# Patient Record
Sex: FEMALE | Race: WHITE | NOT HISPANIC OR LATINO | Employment: FULL TIME | RURAL
[De-identification: names, ages, dates, MRNs, and addresses within clinical notes are randomized per-mention and may not be internally consistent; named-entity substitution may affect disease eponyms.]

---

## 2021-04-10 ENCOUNTER — HOSPITAL ENCOUNTER (EMERGENCY)
Facility: HOSPITAL | Age: 46
Discharge: HOME | End: 2021-04-10
Attending: STUDENT IN AN ORGANIZED HEALTH CARE EDUCATION/TRAINING PROGRAM
Payer: COMMERCIAL

## 2021-04-10 VITALS
BODY MASS INDEX: 45.91 KG/M2 | TEMPERATURE: 99 F | RESPIRATION RATE: 20 BRPM | HEIGHT: 66 IN | DIASTOLIC BLOOD PRESSURE: 89 MMHG | WEIGHT: 285.69 LBS | OXYGEN SATURATION: 98 % | SYSTOLIC BLOOD PRESSURE: 176 MMHG | HEART RATE: 51 BPM

## 2021-04-10 DIAGNOSIS — G43.009 MIGRAINE WITHOUT AURA AND WITHOUT STATUS MIGRAINOSUS, NOT INTRACTABLE: Primary | ICD-10-CM

## 2021-04-10 DIAGNOSIS — I16.0 HYPERTENSIVE URGENCY: ICD-10-CM

## 2021-04-10 PROCEDURE — 25000003 PHARM REV CODE 250: Performed by: STUDENT IN AN ORGANIZED HEALTH CARE EDUCATION/TRAINING PROGRAM

## 2021-04-10 PROCEDURE — 99283 PR EMERGENCY DEPT VISIT,LEVEL III: ICD-10-PCS | Mod: ,,, | Performed by: STUDENT IN AN ORGANIZED HEALTH CARE EDUCATION/TRAINING PROGRAM

## 2021-04-10 PROCEDURE — 99283 EMERGENCY DEPT VISIT LOW MDM: CPT | Mod: ,,, | Performed by: STUDENT IN AN ORGANIZED HEALTH CARE EDUCATION/TRAINING PROGRAM

## 2021-04-10 PROCEDURE — 63600175 PHARM REV CODE 636 W HCPCS: Performed by: STUDENT IN AN ORGANIZED HEALTH CARE EDUCATION/TRAINING PROGRAM

## 2021-04-10 PROCEDURE — 99283 EMERGENCY DEPT VISIT LOW MDM: CPT | Mod: 25

## 2021-04-10 PROCEDURE — 96372 THER/PROPH/DIAG INJ SC/IM: CPT

## 2021-04-10 RX ORDER — SERTRALINE HYDROCHLORIDE 25 MG/1
25 TABLET, FILM COATED ORAL DAILY
COMMUNITY
End: 2023-03-28

## 2021-04-10 RX ORDER — BUTALBITAL, ACETAMINOPHEN AND CAFFEINE 50; 325; 40 MG/1; MG/1; MG/1
1 TABLET ORAL EVERY 4 HOURS PRN
Qty: 20 TABLET | Refills: 0 | Status: SHIPPED | OUTPATIENT
Start: 2021-04-10 | End: 2021-05-03

## 2021-04-10 RX ORDER — ALPRAZOLAM 0.25 MG/1
0.25 TABLET ORAL DAILY PRN
COMMUNITY
End: 2021-05-03

## 2021-04-10 RX ORDER — NAPROXEN 250 MG/1
500 TABLET ORAL ONCE
Status: COMPLETED | OUTPATIENT
Start: 2021-04-10 | End: 2021-04-10

## 2021-04-10 RX ORDER — NAPROXEN 500 MG/1
500 TABLET ORAL 2 TIMES DAILY PRN
Qty: 14 TABLET | Refills: 0 | OUTPATIENT
Start: 2021-04-10 | End: 2021-08-11

## 2021-04-10 RX ORDER — PROCHLORPERAZINE EDISYLATE 5 MG/ML
10 INJECTION INTRAMUSCULAR; INTRAVENOUS
Status: COMPLETED | OUTPATIENT
Start: 2021-04-10 | End: 2021-04-10

## 2021-04-10 RX ORDER — HYDROCHLOROTHIAZIDE 25 MG/1
37.5 TABLET ORAL DAILY
COMMUNITY
End: 2021-05-03

## 2021-04-10 RX ORDER — METOPROLOL SUCCINATE 50 MG/1
50 TABLET, EXTENDED RELEASE ORAL 2 TIMES DAILY
COMMUNITY
End: 2021-05-03 | Stop reason: SDUPTHER

## 2021-04-10 RX ORDER — CLONIDINE HYDROCHLORIDE 0.1 MG/1
0.2 TABLET ORAL
Status: COMPLETED | OUTPATIENT
Start: 2021-04-10 | End: 2021-04-10

## 2021-04-10 RX ADMIN — CLONIDINE HYDROCHLORIDE 0.2 MG: 0.1 TABLET ORAL at 05:04

## 2021-04-10 RX ADMIN — PROCHLORPERAZINE EDISYLATE 10 MG: 5 INJECTION INTRAMUSCULAR; INTRAVENOUS at 05:04

## 2021-04-10 RX ADMIN — NAPROXEN 500 MG: 250 TABLET ORAL at 05:04

## 2021-05-03 ENCOUNTER — OFFICE VISIT (OUTPATIENT)
Dept: FAMILY MEDICINE | Facility: CLINIC | Age: 46
End: 2021-05-03
Payer: COMMERCIAL

## 2021-05-03 VITALS
DIASTOLIC BLOOD PRESSURE: 74 MMHG | WEIGHT: 280 LBS | HEIGHT: 66 IN | BODY MASS INDEX: 45 KG/M2 | TEMPERATURE: 98 F | HEART RATE: 68 BPM | OXYGEN SATURATION: 96 % | SYSTOLIC BLOOD PRESSURE: 138 MMHG | RESPIRATION RATE: 17 BRPM

## 2021-05-03 DIAGNOSIS — I10 HYPERTENSION, UNSPECIFIED TYPE: Primary | ICD-10-CM

## 2021-05-03 DIAGNOSIS — K21.9 GASTROESOPHAGEAL REFLUX DISEASE, UNSPECIFIED WHETHER ESOPHAGITIS PRESENT: ICD-10-CM

## 2021-05-03 DIAGNOSIS — G44.209 TENSION HEADACHE: ICD-10-CM

## 2021-05-03 LAB
25(OH)D3 SERPL-MCNC: 18.1 NG/ML
ALBUMIN SERPL BCP-MCNC: 3.6 G/DL (ref 3.5–5)
ALBUMIN/GLOB SERPL: 0.9 {RATIO}
ALP SERPL-CCNC: 115 U/L (ref 37–98)
ALT SERPL W P-5'-P-CCNC: 26 U/L (ref 13–56)
ANION GAP SERPL CALCULATED.3IONS-SCNC: 10 MMOL/L (ref 7–16)
AST SERPL W P-5'-P-CCNC: 13 U/L (ref 15–37)
BASOPHILS # BLD AUTO: 0.05 K/UL (ref 0–0.2)
BASOPHILS NFR BLD AUTO: 0.6 % (ref 0–1)
BILIRUB SERPL-MCNC: 0.3 MG/DL (ref 0–1.2)
BILIRUB UR QL STRIP: NEGATIVE
BUN SERPL-MCNC: 10 MG/DL (ref 7–18)
BUN/CREAT SERPL: 14 (ref 6–20)
CALCIUM SERPL-MCNC: 8.7 MG/DL (ref 8.5–10.1)
CHLORIDE SERPL-SCNC: 106 MMOL/L (ref 98–107)
CHOLEST SERPL-MCNC: 164 MG/DL (ref 0–200)
CHOLEST/HDLC SERPL: 3.5 {RATIO}
CLARITY UR: CLEAR
CO2 SERPL-SCNC: 30 MMOL/L (ref 21–32)
COLOR UR: YELLOW
CREAT SERPL-MCNC: 0.7 MG/DL (ref 0.55–1.02)
DIFFERENTIAL METHOD BLD: ABNORMAL
EOSINOPHIL # BLD AUTO: 0.18 K/UL (ref 0–0.5)
EOSINOPHIL NFR BLD AUTO: 2 % (ref 1–4)
ERYTHROCYTE [DISTWIDTH] IN BLOOD BY AUTOMATED COUNT: 13.4 % (ref 11.5–14.5)
GLOBULIN SER-MCNC: 3.8 G/DL (ref 2–4)
GLUCOSE SERPL-MCNC: 85 MG/DL (ref 74–106)
GLUCOSE UR STRIP-MCNC: NEGATIVE MG/DL
HCT VFR BLD AUTO: 44.8 % (ref 38–47)
HDLC SERPL-MCNC: 47 MG/DL (ref 40–60)
HGB BLD-MCNC: 14.6 G/DL (ref 12–16)
IMM GRANULOCYTES # BLD AUTO: 0.03 K/UL (ref 0–0.04)
IMM GRANULOCYTES NFR BLD: 0.3 % (ref 0–0.4)
KETONES UR STRIP-SCNC: NEGATIVE MG/DL
LDLC SERPL CALC-MCNC: 102 MG/DL
LDLC/HDLC SERPL: 2.2 {RATIO}
LEUKOCYTE ESTERASE UR QL STRIP: NEGATIVE
LYMPHOCYTES # BLD AUTO: 2.26 K/UL (ref 1–4.8)
LYMPHOCYTES NFR BLD AUTO: 25.5 % (ref 27–41)
MCH RBC QN AUTO: 28.6 PG (ref 27–31)
MCHC RBC AUTO-ENTMCNC: 32.6 G/DL (ref 32–36)
MCV RBC AUTO: 87.8 FL (ref 80–96)
MONOCYTES # BLD AUTO: 0.97 K/UL (ref 0–0.8)
MONOCYTES NFR BLD AUTO: 10.9 % (ref 2–6)
MPC BLD CALC-MCNC: 11.1 FL (ref 9.4–12.4)
NEUTROPHILS # BLD AUTO: 5.39 K/UL (ref 1.8–7.7)
NEUTROPHILS NFR BLD AUTO: 60.7 % (ref 53–65)
NITRITE UR QL STRIP: NEGATIVE
NONHDLC SERPL-MCNC: 117 MG/DL
NRBC # BLD AUTO: 0 X10E3/UL
NRBC, AUTO (.00): 0 %
PH UR STRIP: 6 PH UNITS
PLATELET # BLD AUTO: 320 K/UL (ref 150–400)
POTASSIUM SERPL-SCNC: 3.5 MMOL/L (ref 3.5–5.1)
PROT SERPL-MCNC: 7.4 G/DL (ref 6.4–8.2)
PROT UR QL STRIP: NEGATIVE
RBC # BLD AUTO: 5.1 M/UL (ref 4.2–5.4)
RBC # UR STRIP: ABNORMAL /UL
SODIUM SERPL-SCNC: 142 MMOL/L (ref 136–145)
SP GR UR STRIP: 1.02
T4 FREE SERPL-MCNC: 0.92 NG/DL (ref 0.76–1.46)
TRIGL SERPL-MCNC: 76 MG/DL (ref 35–150)
TSH SERPL DL<=0.005 MIU/L-ACNC: 3.36 UIU/ML (ref 0.36–3.74)
UROBILINOGEN UR STRIP-ACNC: 0.2 MG/DL
VLDLC SERPL-MCNC: 15 MG/DL
WBC # BLD AUTO: 8.88 K/UL (ref 4.5–11)

## 2021-05-03 PROCEDURE — 96372 PR INJECTION,THERAP/PROPH/DIAG2ST, IM OR SUBCUT: ICD-10-PCS | Mod: ,,, | Performed by: FAMILY MEDICINE

## 2021-05-03 PROCEDURE — 84439 T4, FREE: ICD-10-PCS | Mod: ,,, | Performed by: CLINICAL MEDICAL LABORATORY

## 2021-05-03 PROCEDURE — 85025 CBC WITH DIFFERENTIAL: ICD-10-PCS | Mod: ,,, | Performed by: CLINICAL MEDICAL LABORATORY

## 2021-05-03 PROCEDURE — 82306 VITAMIN D: ICD-10-PCS | Mod: ,,, | Performed by: CLINICAL MEDICAL LABORATORY

## 2021-05-03 PROCEDURE — 82306 VITAMIN D 25 HYDROXY: CPT | Mod: ,,, | Performed by: CLINICAL MEDICAL LABORATORY

## 2021-05-03 PROCEDURE — 80061 LIPID PANEL: CPT | Mod: ,,, | Performed by: CLINICAL MEDICAL LABORATORY

## 2021-05-03 PROCEDURE — 85025 COMPLETE CBC W/AUTO DIFF WBC: CPT | Mod: ,,, | Performed by: CLINICAL MEDICAL LABORATORY

## 2021-05-03 PROCEDURE — 96372 THER/PROPH/DIAG INJ SC/IM: CPT | Mod: ,,, | Performed by: FAMILY MEDICINE

## 2021-05-03 PROCEDURE — 81003 URINALYSIS AUTO W/O SCOPE: CPT | Mod: QW,,, | Performed by: CLINICAL MEDICAL LABORATORY

## 2021-05-03 PROCEDURE — 80061 LIPID PANEL: ICD-10-PCS | Mod: ,,, | Performed by: CLINICAL MEDICAL LABORATORY

## 2021-05-03 PROCEDURE — 81003 URINALYSIS: ICD-10-PCS | Mod: QW,,, | Performed by: CLINICAL MEDICAL LABORATORY

## 2021-05-03 PROCEDURE — 99214 PR OFFICE/OUTPT VISIT, EST, LEVL IV, 30-39 MIN: ICD-10-PCS | Mod: 25,,, | Performed by: FAMILY MEDICINE

## 2021-05-03 PROCEDURE — 84439 ASSAY OF FREE THYROXINE: CPT | Mod: ,,, | Performed by: CLINICAL MEDICAL LABORATORY

## 2021-05-03 PROCEDURE — 99214 OFFICE O/P EST MOD 30 MIN: CPT | Mod: 25,,, | Performed by: FAMILY MEDICINE

## 2021-05-03 RX ORDER — TRIAMTERENE AND HYDROCHLOROTHIAZIDE 37.5; 25 MG/1; MG/1
1 CAPSULE ORAL EVERY MORNING
COMMUNITY
End: 2021-05-03 | Stop reason: SDUPTHER

## 2021-05-03 RX ORDER — KETOROLAC TROMETHAMINE 30 MG/ML
60 INJECTION, SOLUTION INTRAMUSCULAR; INTRAVENOUS
Status: COMPLETED | OUTPATIENT
Start: 2021-05-03 | End: 2021-05-03

## 2021-05-03 RX ORDER — TRIAMTERENE AND HYDROCHLOROTHIAZIDE 37.5; 25 MG/1; MG/1
1 CAPSULE ORAL EVERY MORNING
Qty: 30 CAPSULE | Refills: 5 | Status: SHIPPED | OUTPATIENT
Start: 2021-05-03 | End: 2023-03-28

## 2021-05-03 RX ORDER — PANTOPRAZOLE SODIUM 40 MG/1
40 TABLET, DELAYED RELEASE ORAL DAILY
COMMUNITY
End: 2021-05-03 | Stop reason: SDUPTHER

## 2021-05-03 RX ORDER — TIZANIDINE 4 MG/1
4 TABLET ORAL EVERY 8 HOURS PRN
Qty: 35 TABLET | Refills: 0 | Status: SHIPPED | OUTPATIENT
Start: 2021-05-03 | End: 2023-03-28

## 2021-05-03 RX ORDER — ALPRAZOLAM 0.5 MG/1
0.5 TABLET ORAL DAILY PRN
COMMUNITY
Start: 2021-04-08 | End: 2023-12-06

## 2021-05-03 RX ORDER — PANTOPRAZOLE SODIUM 40 MG/1
40 TABLET, DELAYED RELEASE ORAL DAILY
Qty: 30 TABLET | Refills: 2 | Status: SHIPPED | OUTPATIENT
Start: 2021-05-03 | End: 2023-03-28 | Stop reason: SDUPTHER

## 2021-05-03 RX ORDER — METOPROLOL SUCCINATE 50 MG/1
50 TABLET, EXTENDED RELEASE ORAL 2 TIMES DAILY
Qty: 60 TABLET | Refills: 2 | Status: SHIPPED | OUTPATIENT
Start: 2021-05-03 | End: 2023-03-28

## 2021-05-03 RX ORDER — SERTRALINE HYDROCHLORIDE 50 MG/1
50 TABLET, FILM COATED ORAL EVERY MORNING
COMMUNITY
Start: 2021-04-08 | End: 2024-02-14

## 2021-05-03 RX ADMIN — KETOROLAC TROMETHAMINE 60 MG: 30 INJECTION, SOLUTION INTRAMUSCULAR; INTRAVENOUS at 09:05

## 2021-08-11 ENCOUNTER — HOSPITAL ENCOUNTER (EMERGENCY)
Facility: HOSPITAL | Age: 46
Discharge: HOME | End: 2021-08-11
Payer: COMMERCIAL

## 2021-08-11 VITALS
RESPIRATION RATE: 16 BRPM | SYSTOLIC BLOOD PRESSURE: 148 MMHG | BODY MASS INDEX: 45 KG/M2 | HEART RATE: 71 BPM | WEIGHT: 280 LBS | TEMPERATURE: 98 F | DIASTOLIC BLOOD PRESSURE: 105 MMHG | OXYGEN SATURATION: 98 % | HEIGHT: 66 IN

## 2021-08-11 DIAGNOSIS — R06.02 SHORTNESS OF BREATH: ICD-10-CM

## 2021-08-11 DIAGNOSIS — U07.1 CLINICAL DIAGNOSIS OF COVID-19: Primary | ICD-10-CM

## 2021-08-11 PROCEDURE — 99283 EMERGENCY DEPT VISIT LOW MDM: CPT | Mod: ,,, | Performed by: NURSE PRACTITIONER

## 2021-08-11 PROCEDURE — 99283 PR EMERGENCY DEPT VISIT,LEVEL III: ICD-10-PCS | Mod: ,,, | Performed by: NURSE PRACTITIONER

## 2021-08-11 PROCEDURE — 99283 EMERGENCY DEPT VISIT LOW MDM: CPT

## 2021-08-11 RX ORDER — GUAIFENESIN AND DEXTROMETHORPHAN HYDROBROMIDE 1200; 60 MG/1; MG/1
1 TABLET, EXTENDED RELEASE ORAL 2 TIMES DAILY
Qty: 20 TABLET | Refills: 0 | Status: SHIPPED | OUTPATIENT
Start: 2021-08-11 | End: 2021-08-21

## 2021-08-11 RX ORDER — ALBUTEROL SULFATE 90 UG/1
2 AEROSOL, METERED RESPIRATORY (INHALATION) EVERY 6 HOURS PRN
Qty: 20.1 G | Refills: 11 | Status: SHIPPED | OUTPATIENT
Start: 2021-08-11 | End: 2023-03-28

## 2021-11-22 ENCOUNTER — HOSPITAL ENCOUNTER (EMERGENCY)
Facility: HOSPITAL | Age: 46
Discharge: HOME | End: 2021-11-22
Payer: COMMERCIAL

## 2021-11-22 VITALS
DIASTOLIC BLOOD PRESSURE: 95 MMHG | RESPIRATION RATE: 18 BRPM | SYSTOLIC BLOOD PRESSURE: 165 MMHG | HEIGHT: 66 IN | TEMPERATURE: 98 F | HEART RATE: 70 BPM | OXYGEN SATURATION: 97 % | WEIGHT: 280 LBS | BODY MASS INDEX: 45 KG/M2

## 2021-11-22 DIAGNOSIS — R60.0 EDEMA OF RIGHT LOWER LEG: ICD-10-CM

## 2021-11-22 DIAGNOSIS — L03.115 CELLULITIS OF RIGHT LOWER EXTREMITY: Primary | ICD-10-CM

## 2021-11-22 LAB
ANION GAP SERPL CALCULATED.3IONS-SCNC: 8 MMOL/L (ref 7–16)
APTT PPP: 33.6 SECONDS (ref 25.2–37.3)
BASOPHILS # BLD AUTO: 0.07 K/UL (ref 0–0.2)
BASOPHILS NFR BLD AUTO: 0.6 % (ref 0–1)
BUN SERPL-MCNC: 12 MG/DL (ref 7–18)
BUN/CREAT SERPL: 13 (ref 6–20)
CALCIUM SERPL-MCNC: 8.2 MG/DL (ref 8.5–10.1)
CHLORIDE SERPL-SCNC: 102 MMOL/L (ref 98–107)
CO2 SERPL-SCNC: 31 MMOL/L (ref 21–32)
CREAT SERPL-MCNC: 0.89 MG/DL (ref 0.55–1.02)
DIFFERENTIAL METHOD BLD: ABNORMAL
EOSINOPHIL # BLD AUTO: 0.18 K/UL (ref 0–0.5)
EOSINOPHIL NFR BLD AUTO: 1.7 % (ref 1–4)
ERYTHROCYTE [DISTWIDTH] IN BLOOD BY AUTOMATED COUNT: 12.8 % (ref 11.5–14.5)
GLUCOSE SERPL-MCNC: 99 MG/DL (ref 74–106)
HCT VFR BLD AUTO: 40.5 % (ref 38–47)
HGB BLD-MCNC: 13.9 G/DL (ref 12–16)
IMM GRANULOCYTES # BLD AUTO: 0.03 K/UL (ref 0–0.04)
IMM GRANULOCYTES NFR BLD: 0.3 % (ref 0–0.4)
INR BLD: 0.87 (ref 0.9–1.1)
LYMPHOCYTES # BLD AUTO: 3.41 K/UL (ref 1–4.8)
LYMPHOCYTES NFR BLD AUTO: 31.3 % (ref 27–41)
MCH RBC QN AUTO: 29.2 PG (ref 27–31)
MCHC RBC AUTO-ENTMCNC: 34.3 G/DL (ref 32–36)
MCV RBC AUTO: 85.1 FL (ref 80–96)
MONOCYTES # BLD AUTO: 0.92 K/UL (ref 0–0.8)
MONOCYTES NFR BLD AUTO: 8.4 % (ref 2–6)
MPC BLD CALC-MCNC: 10.2 FL (ref 9.4–12.4)
NEUTROPHILS # BLD AUTO: 6.28 K/UL (ref 1.8–7.7)
NEUTROPHILS NFR BLD AUTO: 57.7 % (ref 53–65)
NRBC # BLD AUTO: 0 X10E3/UL
NRBC, AUTO (.00): 0 %
PLATELET # BLD AUTO: 380 K/UL (ref 150–400)
POTASSIUM SERPL-SCNC: 3.1 MMOL/L (ref 3.5–5.1)
PROTHROMBIN TIME: 11.9 SECONDS (ref 11.7–14.7)
RBC # BLD AUTO: 4.76 M/UL (ref 4.2–5.4)
SODIUM SERPL-SCNC: 138 MMOL/L (ref 136–145)
WBC # BLD AUTO: 10.89 K/UL (ref 4.5–11)

## 2021-11-22 PROCEDURE — 99284 EMERGENCY DEPT VISIT MOD MDM: CPT | Mod: 25

## 2021-11-22 PROCEDURE — 25000003 PHARM REV CODE 250: Performed by: NURSE PRACTITIONER

## 2021-11-22 PROCEDURE — 85025 COMPLETE CBC W/AUTO DIFF WBC: CPT | Performed by: NURSE PRACTITIONER

## 2021-11-22 PROCEDURE — 96372 THER/PROPH/DIAG INJ SC/IM: CPT

## 2021-11-22 PROCEDURE — 36415 COLL VENOUS BLD VENIPUNCTURE: CPT | Performed by: NURSE PRACTITIONER

## 2021-11-22 PROCEDURE — 85610 PROTHROMBIN TIME: CPT | Performed by: NURSE PRACTITIONER

## 2021-11-22 PROCEDURE — 99283 EMERGENCY DEPT VISIT LOW MDM: CPT | Mod: ,,, | Performed by: NURSE PRACTITIONER

## 2021-11-22 PROCEDURE — 80048 BASIC METABOLIC PNL TOTAL CA: CPT | Performed by: NURSE PRACTITIONER

## 2021-11-22 PROCEDURE — 99283 PR EMERGENCY DEPT VISIT,LEVEL III: ICD-10-PCS | Mod: ,,, | Performed by: NURSE PRACTITIONER

## 2021-11-22 PROCEDURE — 85730 THROMBOPLASTIN TIME PARTIAL: CPT | Performed by: NURSE PRACTITIONER

## 2021-11-22 RX ORDER — CLINDAMYCIN PHOSPHATE 150 MG/ML
600 INJECTION, SOLUTION INTRAVENOUS
Status: COMPLETED | OUTPATIENT
Start: 2021-11-22 | End: 2021-11-22

## 2021-11-22 RX ORDER — CLINDAMYCIN HYDROCHLORIDE 150 MG/1
300 CAPSULE ORAL EVERY 8 HOURS
Qty: 42 CAPSULE | Refills: 0 | Status: SHIPPED | OUTPATIENT
Start: 2021-11-22 | End: 2021-11-29

## 2021-11-22 RX ADMIN — CLINDAMYCIN PHOSPHATE 600 MG: 150 INJECTION, SOLUTION INTRAVENOUS at 09:11

## 2023-02-06 ENCOUNTER — HOSPITAL ENCOUNTER (EMERGENCY)
Facility: HOSPITAL | Age: 48
Discharge: HOME | End: 2023-02-06
Payer: COMMERCIAL

## 2023-02-06 VITALS
WEIGHT: 290 LBS | HEART RATE: 76 BPM | SYSTOLIC BLOOD PRESSURE: 144 MMHG | OXYGEN SATURATION: 95 % | DIASTOLIC BLOOD PRESSURE: 79 MMHG | HEIGHT: 66 IN | RESPIRATION RATE: 19 BRPM | BODY MASS INDEX: 46.61 KG/M2 | TEMPERATURE: 98 F

## 2023-02-06 DIAGNOSIS — K80.20 CALCULUS OF GALLBLADDER WITHOUT CHOLECYSTITIS WITHOUT OBSTRUCTION: ICD-10-CM

## 2023-02-06 DIAGNOSIS — K59.00 CONSTIPATION, UNSPECIFIED CONSTIPATION TYPE: ICD-10-CM

## 2023-02-06 DIAGNOSIS — M54.16 ACUTE LEFT LUMBAR RADICULOPATHY: Primary | ICD-10-CM

## 2023-02-06 LAB
ALBUMIN SERPL BCP-MCNC: 3.7 G/DL (ref 3.5–5)
ALBUMIN/GLOB SERPL: 1.1 {RATIO}
ALP SERPL-CCNC: 117 U/L (ref 39–100)
ALT SERPL W P-5'-P-CCNC: 32 U/L (ref 13–56)
ANION GAP SERPL CALCULATED.3IONS-SCNC: 11 MMOL/L (ref 7–16)
AST SERPL W P-5'-P-CCNC: 18 U/L (ref 15–37)
BACTERIA #/AREA URNS HPF: ABNORMAL /HPF
BASOPHILS # BLD AUTO: 0.09 K/UL (ref 0–0.2)
BASOPHILS NFR BLD AUTO: 0.8 % (ref 0–1)
BILIRUB SERPL-MCNC: 0.7 MG/DL (ref ?–1.2)
BILIRUB UR QL STRIP: NEGATIVE
BUN SERPL-MCNC: 11 MG/DL (ref 7–18)
BUN/CREAT SERPL: 12 (ref 6–20)
CALCIUM SERPL-MCNC: 8.9 MG/DL (ref 8.5–10.1)
CAOX CRY #/AREA URNS LPF: ABNORMAL /LPF
CHLORIDE SERPL-SCNC: 103 MMOL/L (ref 98–107)
CLARITY UR: ABNORMAL
CO2 SERPL-SCNC: 30 MMOL/L (ref 21–32)
COLOR UR: YELLOW
CREAT SERPL-MCNC: 0.94 MG/DL (ref 0.55–1.02)
DIFFERENTIAL METHOD BLD: ABNORMAL
EGFR (NO RACE VARIABLE) (RUSH/TITUS): 76 ML/MIN/1.73M²
EOSINOPHIL # BLD AUTO: 0.17 K/UL (ref 0–0.5)
EOSINOPHIL NFR BLD AUTO: 1.5 % (ref 1–4)
ERYTHROCYTE [DISTWIDTH] IN BLOOD BY AUTOMATED COUNT: 13.2 % (ref 11.5–14.5)
GLOBULIN SER-MCNC: 3.4 G/DL (ref 2–4)
GLUCOSE SERPL-MCNC: 90 MG/DL (ref 74–106)
GLUCOSE UR STRIP-MCNC: NORMAL MG/DL
HCT VFR BLD AUTO: 41.4 % (ref 38–47)
HGB BLD-MCNC: 14 G/DL (ref 12–16)
IMM GRANULOCYTES # BLD AUTO: 0.04 K/UL (ref 0–0.04)
IMM GRANULOCYTES NFR BLD: 0.3 % (ref 0–0.4)
KETONES UR STRIP-SCNC: ABNORMAL MG/DL
LEUKOCYTE ESTERASE UR QL STRIP: NEGATIVE
LYMPHOCYTES # BLD AUTO: 2.99 K/UL (ref 1–4.8)
LYMPHOCYTES NFR BLD AUTO: 25.8 % (ref 27–41)
MCH RBC QN AUTO: 29 PG (ref 27–31)
MCHC RBC AUTO-ENTMCNC: 33.8 G/DL (ref 32–36)
MCV RBC AUTO: 85.7 FL (ref 80–96)
MONOCYTES # BLD AUTO: 1.05 K/UL (ref 0–0.8)
MONOCYTES NFR BLD AUTO: 9.1 % (ref 2–6)
MPC BLD CALC-MCNC: 9.6 FL (ref 9.4–12.4)
MUCOUS THREADS #/AREA URNS HPF: ABNORMAL /HPF
NEUTROPHILS # BLD AUTO: 7.24 K/UL (ref 1.8–7.7)
NEUTROPHILS NFR BLD AUTO: 62.5 % (ref 53–65)
NITRITE UR QL STRIP: NEGATIVE
NRBC # BLD AUTO: 0 X10E3/UL
NRBC, AUTO (.00): 0 %
PH UR STRIP: 5.5 PH UNITS
PLATELET # BLD AUTO: 353 K/UL (ref 150–400)
POTASSIUM SERPL-SCNC: 3.6 MMOL/L (ref 3.5–5.1)
PROT SERPL-MCNC: 7.1 G/DL (ref 6.4–8.2)
PROT UR QL STRIP: 50
RBC # BLD AUTO: 4.83 M/UL (ref 4.2–5.4)
RBC # UR STRIP: ABNORMAL /UL
RBC #/AREA URNS HPF: ABNORMAL /HPF
SODIUM SERPL-SCNC: 140 MMOL/L (ref 136–145)
SP GR UR STRIP: 1.03
SQUAMOUS #/AREA URNS LPF: ABNORMAL /LPF
UROBILINOGEN UR STRIP-ACNC: 2 MG/DL
WBC # BLD AUTO: 11.58 K/UL (ref 4.5–11)
WBC #/AREA URNS HPF: ABNORMAL /HPF
YEAST #/AREA URNS HPF: ABNORMAL /HPF

## 2023-02-06 PROCEDURE — 25000003 PHARM REV CODE 250: Performed by: NURSE PRACTITIONER

## 2023-02-06 PROCEDURE — 99284 PR EMERGENCY DEPT VISIT,LEVEL IV: ICD-10-PCS | Mod: ,,, | Performed by: NURSE PRACTITIONER

## 2023-02-06 PROCEDURE — 96374 THER/PROPH/DIAG INJ IV PUSH: CPT

## 2023-02-06 PROCEDURE — 80053 COMPREHEN METABOLIC PANEL: CPT | Performed by: NURSE PRACTITIONER

## 2023-02-06 PROCEDURE — 99284 EMERGENCY DEPT VISIT MOD MDM: CPT | Mod: ,,, | Performed by: NURSE PRACTITIONER

## 2023-02-06 PROCEDURE — 85025 COMPLETE CBC W/AUTO DIFF WBC: CPT | Performed by: NURSE PRACTITIONER

## 2023-02-06 PROCEDURE — 63600175 PHARM REV CODE 636 W HCPCS: Performed by: NURSE PRACTITIONER

## 2023-02-06 PROCEDURE — 81003 URINALYSIS AUTO W/O SCOPE: CPT | Performed by: NURSE PRACTITIONER

## 2023-02-06 PROCEDURE — 99285 EMERGENCY DEPT VISIT HI MDM: CPT | Mod: 25

## 2023-02-06 PROCEDURE — 96372 THER/PROPH/DIAG INJ SC/IM: CPT | Performed by: NURSE PRACTITIONER

## 2023-02-06 PROCEDURE — 96361 HYDRATE IV INFUSION ADD-ON: CPT

## 2023-02-06 RX ORDER — IBUPROFEN 800 MG/1
800 TABLET ORAL 3 TIMES DAILY
Qty: 20 TABLET | Refills: 0 | Status: SHIPPED | OUTPATIENT
Start: 2023-02-06 | End: 2023-03-28

## 2023-02-06 RX ORDER — KETOROLAC TROMETHAMINE 30 MG/ML
60 INJECTION, SOLUTION INTRAMUSCULAR; INTRAVENOUS
Status: COMPLETED | OUTPATIENT
Start: 2023-02-06 | End: 2023-02-06

## 2023-02-06 RX ORDER — TIZANIDINE 4 MG/1
4 TABLET ORAL EVERY 6 HOURS PRN
Qty: 20 TABLET | Refills: 0 | Status: SHIPPED | OUTPATIENT
Start: 2023-02-06 | End: 2023-02-16

## 2023-02-06 RX ORDER — ORPHENADRINE CITRATE 30 MG/ML
60 INJECTION INTRAMUSCULAR; INTRAVENOUS
Status: COMPLETED | OUTPATIENT
Start: 2023-02-06 | End: 2023-02-06

## 2023-02-06 RX ORDER — ONDANSETRON 2 MG/ML
4 INJECTION INTRAMUSCULAR; INTRAVENOUS
Status: COMPLETED | OUTPATIENT
Start: 2023-02-06 | End: 2023-02-06

## 2023-02-06 RX ADMIN — SODIUM CHLORIDE 1000 ML: 9 INJECTION, SOLUTION INTRAVENOUS at 07:02

## 2023-02-06 RX ADMIN — ONDANSETRON HYDROCHLORIDE 4 MG: 2 SOLUTION INTRAMUSCULAR; INTRAVENOUS at 07:02

## 2023-02-06 RX ADMIN — ORPHENADRINE CITRATE 60 MG: 30 INJECTION INTRAMUSCULAR; INTRAVENOUS at 10:02

## 2023-02-06 RX ADMIN — KETOROLAC TROMETHAMINE 60 MG: 60 INJECTION, SOLUTION INTRAMUSCULAR at 07:02

## 2023-02-06 NOTE — Clinical Note
"Jonathan "Jonathan" Need was seen and treated in our emergency department on 2/6/2023.  She may return to work on 02/09/2023.       If you have any questions or concerns, please don't hesitate to call.      ANA LAURA Ballard"

## 2023-02-07 NOTE — ED PROVIDER NOTES
Encounter Date: 2/6/2023       History     Chief Complaint   Patient presents with    Abdominal Pain     Patient presents to the ER with complaint of left lower quadrant abdominal pain that radiates from left abdomen into the groin into the left leg down to the knee.  Patient states the symptoms started today acutely.  She denies injury or accident.  She denies fever.  No dysuria.  She does report frequency of urination.  She reports normal bowel movements with occasional loose stools.  No nausea vomiting or diarrhea.     The history is provided by the spouse and the patient. No  was used.   Review of patient's allergies indicates:   Allergen Reactions    Lisinopril Other (See Comments)     cough    Aspirin Nausea And Vomiting     Past Medical History:   Diagnosis Date    Hypertension     Pseudoseizures      Past Surgical History:   Procedure Laterality Date    bladder tack      HYSTERECTOMY       No family history on file.  Social History     Tobacco Use    Smoking status: Never    Smokeless tobacco: Never   Substance Use Topics    Alcohol use: Never    Drug use: Never     Review of Systems   Constitutional:  Positive for activity change, appetite change and fatigue.   Gastrointestinal:  Positive for abdominal distention and abdominal pain.   Musculoskeletal:  Positive for back pain and myalgias.   All other systems reviewed and are negative.    Physical Exam     Initial Vitals [02/06/23 1834]   BP Pulse Resp Temp SpO2   (!) 163/118 104 19 97.7 °F (36.5 °C) 97 %      MAP       --         Physical Exam    Nursing note and vitals reviewed.  Constitutional: She appears well-developed and well-nourished.   HENT:   Head: Normocephalic.   Right Ear: External ear normal.   Left Ear: External ear normal.   Nose: Nose normal.   Mouth/Throat: Oropharynx is clear and moist.   Eyes: Conjunctivae and EOM are normal. Pupils are equal, round, and reactive to light.   Neck: Neck supple.   Normal range of  motion.  Cardiovascular:  Normal rate, regular rhythm, normal heart sounds and intact distal pulses.           Pulmonary/Chest: Breath sounds normal.   Abdominal: Abdomen is soft and protuberant. Bowel sounds are normal. She exhibits distension. There is abdominal tenderness (Left lower quadrant) in the suprapubic area and left lower quadrant.     There is no rebound and negative Portillo's sign.   Musculoskeletal:         General: Normal range of motion.      Cervical back: Normal range of motion and neck supple.     Neurological: She is alert and oriented to person, place, and time. She has normal strength. GCS score is 15. GCS eye subscore is 4. GCS verbal subscore is 5. GCS motor subscore is 6.   Skin: Skin is warm and dry. Capillary refill takes less than 2 seconds.   Psychiatric: She has a normal mood and affect. Her behavior is normal. Judgment and thought content normal.       Medical Screening Exam   See Full Note    ED Course   Procedures  Labs Reviewed   COMPREHENSIVE METABOLIC PANEL - Abnormal; Notable for the following components:       Result Value    Alk Phos 117 (*)     All other components within normal limits   URINALYSIS, REFLEX TO URINE CULTURE - Abnormal; Notable for the following components:    Protein, UA 50 (*)     Urobilinogen, UA 2 (*)     Blood, UA Small (*)     All other components within normal limits   CBC WITH DIFFERENTIAL - Abnormal; Notable for the following components:    WBC 11.58 (*)     Lymphocytes % 25.8 (*)     Monocytes % 9.1 (*)     Monocytes, Absolute 1.05 (*)     All other components within normal limits   URINALYSIS, MICROSCOPIC - Abnormal; Notable for the following components:    Calcium Oxalate Crystals, UA Many (*)     All other components within normal limits   CBC W/ AUTO DIFFERENTIAL    Narrative:     The following orders were created for panel order CBC auto differential.  Procedure                               Abnormality         Status                     ---------                                -----------         ------                     CBC with Differential[[id number]]        Abnormal            Final result                 Please view results for these tests on the individual orders.   EXTRA TUBES    Narrative:     The following orders were created for panel order EXTRA TUBES.  Procedure                               Abnormality         Status                     ---------                               -----------         ------                     Light Blue Top Hold[[id number]]                              In process                 Light Green Top Hold[[id number]]                             In process                   Please view results for these tests on the individual orders.   LIGHT BLUE TOP HOLD   LIGHT GREEN TOP HOLD          Imaging Results              CT Renal Stone Study ABD Pelvis WO (In process)                      X-Ray Lumbar Spine Ap And Lateral (Final result)  Result time 02/06/23 19:55:52      Final result by Juan Holly DO (02/06/23 19:55:52)                   Impression:      No acute fracture.    No malalignment.    Mild multi-level degenerative change.    Multiple large stones located within the gallbladder with the largest measuring up to 3 cm.      Electronically signed by: Juan Holly  Date:    02/06/2023  Time:    19:55               Narrative:    EXAMINATION:  XR LUMBAR SPINE AP AND LATERAL    CLINICAL HISTORY:  left flank and lower abdominal pain that radiates to left leg;.    TECHNIQUE:  XR LUMBAR SPINE AP AND LATERAL    COMPARISON:  2014    FINDINGS:  No acute fracture.    No malalignment.    Mild multi-level degenerative change.    Multiple large stones located within the gallbladder with the largest measuring up to 3 cm.                                       Medications   sodium chloride 0.9% bolus 1,000 mL 1,000 mL (0 mLs Intravenous Stopped 2/6/23 2045)   ondansetron injection 4 mg (4 mg Intravenous Given 2/6/23 1945)    ketorolac injection 60 mg (60 mg Intramuscular Given 2/6/23 1944)   orphenadrine injection 60 mg (60 mg Intramuscular Given 2/6/23 2236)     Medical Decision Making:   Initial Assessment:   Patient presents to the ER with complaint of left lower quadrant abdominal pain that radiates from left abdomen into the groin into the left leg down to the knee.  Patient states the symptoms started today acutely.  She denies injury or accident.  She denies fever.  No dysuria.  She does report frequency of urination.  She reports normal bowel movements with occasional loose stools.  No nausea vomiting or diarrhea.    Differential Diagnosis:   Colitis  Diverticulitis  Renal calculi  Ureteral calculi    ED Management:  CBC white blood H/H 14/41, platelets 353  CMP sodium 140, potassium 3.6, creatinine 0.9, BUN 11, alk-phos 117  Urinalysis positive for protein and small amount of occult blood, no leukocytes or nitrites noted many calcium oxalate crystals noted.    Lumbar x-ray:No acute fracture.No malalignment.Mild multi-level degenerative change.Multiple large stones located within the gallbladder with the largest measuring up to 3 cm.  Moderate colonic stool.  Initiate IV  1 L normal saline bolus  Toradol 30 mg IV-patient voiced improvement of pain after dose of Toradol.  CT scan of the abdomen and pelvis without contrast cholelithiasis no renal or ureteral calculi no suspicious GI abnormalities are seen     Norflex 60 mg IM to be given for muscle spasms.  Patient will be discharged home with diagnosis of constipation, and incidental finding of cholelithiasis.  She will be given referral to General surgery for follow up the cholelithiasis.  Shoes instructed to add MiraLax 1 capful to daily medications for the next 10-12 days.  She was also instructed to increase her water and fiber intake in her diet.  She will be given prescription for tizanidine and ibuprofen to treat muscle spasms.  She was instructed to follow up with  primary care provider in 2 days if symptoms do not improve.  She was instructed to return to the ER with new or worsening symptoms.  Patient verbalizes understanding and agrees with plan of care.                 Clinical Impression:   Final diagnoses:  [K59.00] Constipation, unspecified constipation type  [K80.20] Calculus of gallbladder without cholecystitis without obstruction  [M54.16] Acute left lumbar radiculopathy (Primary)        ED Disposition Condition    Discharge Stable          ED Prescriptions       Medication Sig Dispense Start Date End Date Auth. Provider    tiZANidine (ZANAFLEX) 4 MG tablet Take 1 tablet (4 mg total) by mouth every 6 (six) hours as needed (Muscle spasms). 20 tablet 2/6/2023 2/16/2023 ANA LAURA Ballard    ibuprofen (ADVIL,MOTRIN) 800 MG tablet Take 1 tablet (800 mg total) by mouth 3 (three) times daily. 20 tablet 2/6/2023 -- ANA LAURA Ballard          Follow-up Information       Follow up With Specialties Details Why Contact Info    Primary care provider  Schedule an appointment as soon as possible for a visit in 2 days If symptoms worsen     Mikael Cunha MD General Surgery, Surgery Schedule an appointment as soon as possible for a visit   [address] [place] [id number]  [phone]               ANA LAURA Ballard  02/06/23 9480

## 2023-02-07 NOTE — DISCHARGE INSTRUCTIONS
Take medications as prescribed-For muscle spasms.  Alternate heat and ice compresses for comfort.  Add MiraLax 1 capful to daily medications times 10-12 days.  Increase water and fiber intake in diet.  Call Dr. Cunha's office to schedule follow up appointment cholelithiasis.  Follow up primary care provider in 2 days if symptoms do not improve.  Return to the ER with new or worsening symptoms.

## 2023-02-20 ENCOUNTER — OFFICE VISIT (OUTPATIENT)
Dept: SURGERY | Facility: CLINIC | Age: 48
End: 2023-02-20
Attending: SURGERY
Payer: COMMERCIAL

## 2023-02-20 VITALS
HEIGHT: 66 IN | TEMPERATURE: 98 F | RESPIRATION RATE: 22 BRPM | BODY MASS INDEX: 46.28 KG/M2 | OXYGEN SATURATION: 97 % | WEIGHT: 288 LBS | SYSTOLIC BLOOD PRESSURE: 98 MMHG | HEART RATE: 83 BPM | DIASTOLIC BLOOD PRESSURE: 60 MMHG

## 2023-02-20 DIAGNOSIS — M54.10 RADICULAR LOW BACK PAIN: ICD-10-CM

## 2023-02-20 DIAGNOSIS — K59.09 CHRONIC CONSTIPATION: ICD-10-CM

## 2023-02-20 DIAGNOSIS — K59.00 CONSTIPATION, UNSPECIFIED CONSTIPATION TYPE: Primary | ICD-10-CM

## 2023-02-20 DIAGNOSIS — K80.10 CALCULUS OF GALLBLADDER WITH CHRONIC CHOLECYSTITIS WITHOUT OBSTRUCTION: ICD-10-CM

## 2023-02-20 DIAGNOSIS — M54.16 ACUTE LEFT LUMBAR RADICULOPATHY: ICD-10-CM

## 2023-02-20 PROCEDURE — 1159F PR MEDICATION LIST DOCUMENTED IN MEDICAL RECORD: ICD-10-PCS | Mod: CPTII,,, | Performed by: SURGERY

## 2023-02-20 PROCEDURE — 99205 PR OFFICE/OUTPT VISIT, NEW, LEVL V, 60-74 MIN: ICD-10-PCS | Mod: S$PBB,,, | Performed by: SURGERY

## 2023-02-20 PROCEDURE — 3078F DIAST BP <80 MM HG: CPT | Mod: CPTII,,, | Performed by: SURGERY

## 2023-02-20 PROCEDURE — 3078F PR MOST RECENT DIASTOLIC BLOOD PRESSURE < 80 MM HG: ICD-10-PCS | Mod: CPTII,,, | Performed by: SURGERY

## 2023-02-20 PROCEDURE — 99215 OFFICE O/P EST HI 40 MIN: CPT | Mod: PBBFAC | Performed by: SURGERY

## 2023-02-20 PROCEDURE — 99205 OFFICE O/P NEW HI 60 MIN: CPT | Mod: S$PBB,,, | Performed by: SURGERY

## 2023-02-20 PROCEDURE — 3008F PR BODY MASS INDEX (BMI) DOCUMENTED: ICD-10-PCS | Mod: CPTII,,, | Performed by: SURGERY

## 2023-02-20 PROCEDURE — 1159F MED LIST DOCD IN RCRD: CPT | Mod: CPTII,,, | Performed by: SURGERY

## 2023-02-20 PROCEDURE — 3008F BODY MASS INDEX DOCD: CPT | Mod: CPTII,,, | Performed by: SURGERY

## 2023-02-20 PROCEDURE — 3074F PR MOST RECENT SYSTOLIC BLOOD PRESSURE < 130 MM HG: ICD-10-PCS | Mod: CPTII,,, | Performed by: SURGERY

## 2023-02-20 PROCEDURE — 3074F SYST BP LT 130 MM HG: CPT | Mod: CPTII,,, | Performed by: SURGERY

## 2023-02-20 NOTE — H&P (VIEW-ONLY)
"Subjective:       Patient ID: Jonathan Rodriguez is a 47 y.o. female.    Chief Complaint: Other (Pt here because she is having pain she thinks from Gallbladder issues--went to ER on 2-6-23 with gall bladder issues and constipation--taking Miralax and gummies for fiber--Has left hip pain that radiates down left leg--pain 8/10 on pain scale--took muscle relaxant and  Ibuprofen about 3am today--)    This 47-year-old female patient presents to the clinic with complaint of abdominal pain, constipation, and low back pain with radiation to the left hip and leg.  During a recent workup in the emergency department, she underwent a CT scan which revealed a gallbladder full of gallstones.  She presents to me for further recommendations regarding management.    family history is not on file.  Past Medical History:   Diagnosis Date    Hypertension     Pseudoseizures       Past Surgical History:   Procedure Laterality Date    bladder tack      HYSTERECTOMY         reports that she has never smoked. She has never used smokeless tobacco. She reports that she does not drink alcohol and does not use drugs.     Review of Systems   All other systems reviewed and are negative.       Objective:      BP 98/60 (BP Location: Other (Comment), Patient Position: Sitting) Comment (BP Location): right wrist  Pulse 83   Temp 98.3 °F (36.8 °C) (Oral)   Resp (!) 22   Ht 5' 6" (1.676 m)   Wt 130.6 kg (288 lb)   SpO2 97%   BMI 46.48 kg/m²    Physical Exam  Constitutional:       General: She is awake.      Appearance: Normal appearance.   HENT:      Head: Atraumatic.   Cardiovascular:      Rate and Rhythm: Normal rate and regular rhythm.   Pulmonary:      Effort: Pulmonary effort is normal.   Chest:      Chest wall: No deformity.   Abdominal:      General: There is no distension.      Palpations: Abdomen is soft. There is no mass.      Tenderness: There is no abdominal tenderness.      Hernia: No hernia is present.   Musculoskeletal:         " General: No swelling.      Right lower leg: No edema.      Left lower leg: No edema.   Skin:     General: Skin is warm and dry.      Capillary Refill: Capillary refill takes less than 2 seconds.   Neurological:      General: No focal deficit present.      Mental Status: She is alert.   Psychiatric:         Mood and Affect: Mood normal.       Assessment/Plan:         Constipation, unspecified constipation type  -     Ambulatory referral/consult to Gastroenterology; Future; Expected date: 02/27/2023    Acute left lumbar radiculopathy  -     Ambulatory referral/consult to General Surgery  -     Ambulatory referral/consult to Spine Surgery; Future; Expected date: 02/27/2023    Calculus of gallbladder with chronic cholecystitis without obstruction    Chronic constipation    Radicular low back pain         Problem List Items Addressed This Visit          GI    Cholelithiasis with chronic cholecystitis without biliary obstruction    Current Assessment & Plan     Risks and benefits of surgery discussed to include infection, bleeding, hernia, possible drain, duct injury, retained stones, open conversion, possible need for postop ERCP or further surgery, and unforeseen.  Patient expresses understanding and wishes to proceed.         Chronic constipation    Current Assessment & Plan     Referral to GI for possible colonoscopy and further workup of constipation            Orthopedic    Radicular low back pain    Current Assessment & Plan     Refer to ortho spine at some point in the near future.          Other Visit Diagnoses       Constipation, unspecified constipation type    -  Primary    Relevant Orders    Ambulatory referral/consult to Gastroenterology    Acute left lumbar radiculopathy        Relevant Orders    Ambulatory referral/consult to Spine Surgery

## 2023-02-20 NOTE — PATIENT INSTRUCTIONS
[facility]                                                                                                                                                                                                                           Day of Surgery      Your surgery is scheduled for 03/10/2023 at [facility] on the ground floor of the Ambulatory building.     Your arrival time is 0600am.              DO NOT EAT OR DRINK ANYTHING AFTER MIDNIGHT.          You may take blood pressure medication with a small drink of water the morning of surgery.                                 DO NOT TAKE INSULIN OR ANY OTHER BLOOD SUGAR MEDICATIONS.           The following blood sugar medications have to be stopped 48 hours prior to surgery:                    Metformin        Glucovance          Metaglip             Fortamet           Glucophage                   Riomet             Avandamet          Glimepiride              IF YOU ARE ON ANY OF THESE BLOOD THINNERS, MAKE SURE YOUR PHYSICIAN IS AWARE.                Eliquis/Apixaban             Xarelto/Rivaroxaban             Plavix/Clopidogrel                  Wafarin/Coumadin,Jantoven           Pletal/Cilostazol              Pradaxa/Dibigatran                           PLEASE USE CHLORHEXIDINE WASH THE NIGHT BEFORE SURGERY AND THE MORNING OF SURGERY.             YOU CANNOT DRIVE YOURSELF HOME FROM THE HOSPITAL THE DAY OF SURGERY.        Please have a  with you.           Bring all medications, that you are currently taking, with you to the hospital the morning of your procedure.           Please leave all valuables at home.          Children under the age of 18 must be accompanied by an adult.          PLEASE UNDERSTAND THAT OUR OFFICE DOES NOT GIVE PATHOLOGY RESULTS OR TEST  RESULTS OVER THE PHONE.         THIS WILL BE DISCUSSED WITH YOU ON YOUR FOLLOW UP APPOINTMENT TO DISCUSS IN PERSON.

## 2023-02-21 RX ORDER — SODIUM CHLORIDE 9 MG/ML
INJECTION, SOLUTION INTRAVENOUS CONTINUOUS
Status: CANCELLED | OUTPATIENT
Start: 2023-02-21

## 2023-03-08 DIAGNOSIS — M54.16 LUMBAR RADICULOPATHY: Primary | ICD-10-CM

## 2023-03-10 ENCOUNTER — ANESTHESIA (OUTPATIENT)
Dept: SURGERY | Facility: HOSPITAL | Age: 48
End: 2023-03-10
Payer: COMMERCIAL

## 2023-03-10 ENCOUNTER — HOSPITAL ENCOUNTER (OUTPATIENT)
Facility: HOSPITAL | Age: 48
Discharge: HOME | End: 2023-03-10
Attending: SURGERY | Admitting: SURGERY
Payer: COMMERCIAL

## 2023-03-10 ENCOUNTER — ANESTHESIA EVENT (OUTPATIENT)
Dept: SURGERY | Facility: HOSPITAL | Age: 48
End: 2023-03-10
Payer: COMMERCIAL

## 2023-03-10 VITALS
TEMPERATURE: 98 F | RESPIRATION RATE: 18 BRPM | OXYGEN SATURATION: 90 % | DIASTOLIC BLOOD PRESSURE: 93 MMHG | HEART RATE: 78 BPM | SYSTOLIC BLOOD PRESSURE: 158 MMHG

## 2023-03-10 DIAGNOSIS — K59.00 CONSTIPATION, UNSPECIFIED CONSTIPATION TYPE: ICD-10-CM

## 2023-03-10 DIAGNOSIS — K80.10 CALCULUS OF GALLBLADDER WITH CHRONIC CHOLECYSTITIS WITHOUT OBSTRUCTION: Primary | ICD-10-CM

## 2023-03-10 PROCEDURE — 36000708 HC OR TIME LEV III 1ST 15 MIN: Performed by: SURGERY

## 2023-03-10 PROCEDURE — 27000716 HC OXISENSOR PROBE, ANY SIZE: Performed by: ANESTHESIOLOGY

## 2023-03-10 PROCEDURE — 37000009 HC ANESTHESIA EA ADD 15 MINS: Performed by: SURGERY

## 2023-03-10 PROCEDURE — 88304 TISSUE EXAM BY PATHOLOGIST: CPT | Mod: TC,SUR | Performed by: SURGERY

## 2023-03-10 PROCEDURE — 36000709 HC OR TIME LEV III EA ADD 15 MIN: Performed by: SURGERY

## 2023-03-10 PROCEDURE — D9220A PRA ANESTHESIA: Mod: CRNA,,, | Performed by: NURSE ANESTHETIST, CERTIFIED REGISTERED

## 2023-03-10 PROCEDURE — 25000003 PHARM REV CODE 250: Performed by: NURSE ANESTHETIST, CERTIFIED REGISTERED

## 2023-03-10 PROCEDURE — 37000008 HC ANESTHESIA 1ST 15 MINUTES: Performed by: SURGERY

## 2023-03-10 PROCEDURE — 25000003 PHARM REV CODE 250: Performed by: ANESTHESIOLOGY

## 2023-03-10 PROCEDURE — D9220A PRA ANESTHESIA: Mod: ANES,,, | Performed by: ANESTHESIOLOGY

## 2023-03-10 PROCEDURE — 47562 LAPAROSCOPIC CHOLECYSTECTOMY: CPT | Mod: ,,, | Performed by: SURGERY

## 2023-03-10 PROCEDURE — D9220A PRA ANESTHESIA: ICD-10-PCS | Mod: CRNA,,, | Performed by: NURSE ANESTHETIST, CERTIFIED REGISTERED

## 2023-03-10 PROCEDURE — 25000003 PHARM REV CODE 250: Performed by: SURGERY

## 2023-03-10 PROCEDURE — 27000510 HC BLANKET BAIR HUGGER ANY SIZE: Performed by: ANESTHESIOLOGY

## 2023-03-10 PROCEDURE — 27000165 HC TUBE, ETT CUFFED: Performed by: ANESTHESIOLOGY

## 2023-03-10 PROCEDURE — 63600175 PHARM REV CODE 636 W HCPCS: Performed by: ANESTHESIOLOGY

## 2023-03-10 PROCEDURE — 88304 SURGICAL PATHOLOGY: ICD-10-PCS | Mod: 26,,, | Performed by: PATHOLOGY

## 2023-03-10 PROCEDURE — 88304 TISSUE EXAM BY PATHOLOGIST: CPT | Mod: 26,,, | Performed by: PATHOLOGY

## 2023-03-10 PROCEDURE — 71000033 HC RECOVERY, INTIAL HOUR: Performed by: SURGERY

## 2023-03-10 PROCEDURE — D9220A PRA ANESTHESIA: ICD-10-PCS | Mod: ANES,,, | Performed by: ANESTHESIOLOGY

## 2023-03-10 PROCEDURE — 27201423 OPTIME MED/SURG SUP & DEVICES STERILE SUPPLY: Performed by: SURGERY

## 2023-03-10 PROCEDURE — 27000689 HC BLADE LARYNGOSCOPE ANY SIZE: Performed by: ANESTHESIOLOGY

## 2023-03-10 PROCEDURE — 27000655: Performed by: ANESTHESIOLOGY

## 2023-03-10 PROCEDURE — 71000016 HC POSTOP RECOV ADDL HR: Performed by: SURGERY

## 2023-03-10 PROCEDURE — 47562 PR LAP,CHOLECYSTECTOMY: ICD-10-PCS | Mod: ,,, | Performed by: SURGERY

## 2023-03-10 PROCEDURE — 71000015 HC POSTOP RECOV 1ST HR: Performed by: SURGERY

## 2023-03-10 PROCEDURE — 63600175 PHARM REV CODE 636 W HCPCS: Performed by: NURSE ANESTHETIST, CERTIFIED REGISTERED

## 2023-03-10 RX ORDER — FENTANYL CITRATE 50 UG/ML
INJECTION, SOLUTION INTRAMUSCULAR; INTRAVENOUS
Status: DISCONTINUED | OUTPATIENT
Start: 2023-03-10 | End: 2023-03-10

## 2023-03-10 RX ORDER — LIDOCAINE HYDROCHLORIDE 20 MG/ML
INJECTION, SOLUTION EPIDURAL; INFILTRATION; INTRACAUDAL; PERINEURAL
Status: DISCONTINUED | OUTPATIENT
Start: 2023-03-10 | End: 2023-03-10

## 2023-03-10 RX ORDER — ROCURONIUM BROMIDE 50 MG/5 ML
SYRINGE (ML) INTRAVENOUS
Status: DISCONTINUED | OUTPATIENT
Start: 2023-03-10 | End: 2023-03-10

## 2023-03-10 RX ORDER — CEFAZOLIN SODIUM 1 G/3ML
INJECTION, POWDER, FOR SOLUTION INTRAMUSCULAR; INTRAVENOUS
Status: DISCONTINUED | OUTPATIENT
Start: 2023-03-10 | End: 2023-03-10

## 2023-03-10 RX ORDER — DIPHENHYDRAMINE HYDROCHLORIDE 50 MG/ML
25 INJECTION INTRAMUSCULAR; INTRAVENOUS EVERY 6 HOURS PRN
Status: DISCONTINUED | OUTPATIENT
Start: 2023-03-10 | End: 2023-03-10 | Stop reason: HOSPADM

## 2023-03-10 RX ORDER — MIDAZOLAM HYDROCHLORIDE 1 MG/ML
INJECTION INTRAMUSCULAR; INTRAVENOUS
Status: DISCONTINUED | OUTPATIENT
Start: 2023-03-10 | End: 2023-03-10

## 2023-03-10 RX ORDER — DEXAMETHASONE SODIUM PHOSPHATE 4 MG/ML
INJECTION, SOLUTION INTRA-ARTICULAR; INTRALESIONAL; INTRAMUSCULAR; INTRAVENOUS; SOFT TISSUE
Status: DISCONTINUED | OUTPATIENT
Start: 2023-03-10 | End: 2023-03-10

## 2023-03-10 RX ORDER — ONDANSETRON 4 MG/1
8 TABLET, ORALLY DISINTEGRATING ORAL EVERY 8 HOURS PRN
Status: DISCONTINUED | OUTPATIENT
Start: 2023-03-10 | End: 2023-03-10 | Stop reason: HOSPADM

## 2023-03-10 RX ORDER — MORPHINE SULFATE 10 MG/ML
4 INJECTION INTRAMUSCULAR; INTRAVENOUS; SUBCUTANEOUS EVERY 5 MIN PRN
Status: DISCONTINUED | OUTPATIENT
Start: 2023-03-10 | End: 2023-03-10 | Stop reason: HOSPADM

## 2023-03-10 RX ORDER — SODIUM CHLORIDE 9 MG/ML
INJECTION, SOLUTION INTRAVENOUS CONTINUOUS
Status: DISCONTINUED | OUTPATIENT
Start: 2023-03-10 | End: 2023-03-10 | Stop reason: HOSPADM

## 2023-03-10 RX ORDER — HYDROCODONE BITARTRATE AND ACETAMINOPHEN 7.5; 325 MG/1; MG/1
1 TABLET ORAL EVERY 6 HOURS PRN
Qty: 28 TABLET | Refills: 0 | Status: SHIPPED | OUTPATIENT
Start: 2023-03-10 | End: 2023-03-28

## 2023-03-10 RX ORDER — ONDANSETRON 2 MG/ML
INJECTION INTRAMUSCULAR; INTRAVENOUS
Status: DISCONTINUED | OUTPATIENT
Start: 2023-03-10 | End: 2023-03-10

## 2023-03-10 RX ORDER — SODIUM CHLORIDE, SODIUM LACTATE, POTASSIUM CHLORIDE, CALCIUM CHLORIDE 600; 310; 30; 20 MG/100ML; MG/100ML; MG/100ML; MG/100ML
INJECTION, SOLUTION INTRAVENOUS CONTINUOUS PRN
Status: DISCONTINUED | OUTPATIENT
Start: 2023-03-10 | End: 2023-03-10

## 2023-03-10 RX ORDER — IBUPROFEN 600 MG/1
600 TABLET ORAL EVERY 6 HOURS PRN
Status: DISCONTINUED | OUTPATIENT
Start: 2023-03-10 | End: 2023-03-10 | Stop reason: HOSPADM

## 2023-03-10 RX ORDER — HYDROCODONE BITARTRATE AND ACETAMINOPHEN 10; 325 MG/1; MG/1
1 TABLET ORAL EVERY 4 HOURS PRN
Status: DISCONTINUED | OUTPATIENT
Start: 2023-03-10 | End: 2023-03-10 | Stop reason: HOSPADM

## 2023-03-10 RX ORDER — LABETALOL HYDROCHLORIDE 5 MG/ML
INJECTION, SOLUTION INTRAVENOUS
Status: DISCONTINUED | OUTPATIENT
Start: 2023-03-10 | End: 2023-03-10

## 2023-03-10 RX ORDER — HYDROMORPHONE HYDROCHLORIDE 2 MG/ML
0.5 INJECTION, SOLUTION INTRAMUSCULAR; INTRAVENOUS; SUBCUTANEOUS EVERY 5 MIN PRN
Status: DISCONTINUED | OUTPATIENT
Start: 2023-03-10 | End: 2023-03-10 | Stop reason: HOSPADM

## 2023-03-10 RX ORDER — MEPERIDINE HYDROCHLORIDE 25 MG/ML
25 INJECTION INTRAMUSCULAR; INTRAVENOUS; SUBCUTANEOUS EVERY 10 MIN PRN
Status: DISCONTINUED | OUTPATIENT
Start: 2023-03-10 | End: 2023-03-10 | Stop reason: HOSPADM

## 2023-03-10 RX ORDER — MORPHINE SULFATE 10 MG/ML
4 INJECTION INTRAMUSCULAR; INTRAVENOUS; SUBCUTANEOUS ONCE
Status: DISCONTINUED | OUTPATIENT
Start: 2023-03-10 | End: 2023-03-10 | Stop reason: HOSPADM

## 2023-03-10 RX ORDER — ONDANSETRON 2 MG/ML
4 INJECTION INTRAMUSCULAR; INTRAVENOUS DAILY PRN
Status: DISCONTINUED | OUTPATIENT
Start: 2023-03-10 | End: 2023-03-10 | Stop reason: HOSPADM

## 2023-03-10 RX ORDER — PROPOFOL 10 MG/ML
VIAL (ML) INTRAVENOUS
Status: DISCONTINUED | OUTPATIENT
Start: 2023-03-10 | End: 2023-03-10

## 2023-03-10 RX ORDER — DIMENHYDRINATE 50 MG
50 TABLET ORAL ONCE
Status: COMPLETED | OUTPATIENT
Start: 2023-03-10 | End: 2023-03-10

## 2023-03-10 RX ADMIN — LABETALOL HYDROCHLORIDE 10 MG: 5 INJECTION, SOLUTION INTRAVENOUS at 07:03

## 2023-03-10 RX ADMIN — MIDAZOLAM HYDROCHLORIDE 2 MG: 1 INJECTION, SOLUTION INTRAMUSCULAR; INTRAVENOUS at 07:03

## 2023-03-10 RX ADMIN — PROPOFOL 200 MG: 10 INJECTION, EMULSION INTRAVENOUS at 07:03

## 2023-03-10 RX ADMIN — ONDANSETRON 8 MG: 2 INJECTION INTRAMUSCULAR; INTRAVENOUS at 07:03

## 2023-03-10 RX ADMIN — Medication 40 MG: at 07:03

## 2023-03-10 RX ADMIN — DIMENHYDRINATE 50 MG: 50 TABLET ORAL at 06:03

## 2023-03-10 RX ADMIN — CEFAZOLIN 3 G: 1 INJECTION, POWDER, FOR SOLUTION INTRAMUSCULAR; INTRAVENOUS; PARENTERAL at 07:03

## 2023-03-10 RX ADMIN — SUGAMMADEX 200 MG: 100 INJECTION, SOLUTION INTRAVENOUS at 08:03

## 2023-03-10 RX ADMIN — DEXAMETHASONE SODIUM PHOSPHATE 8 MG: 4 INJECTION, SOLUTION INTRA-ARTICULAR; INTRALESIONAL; INTRAMUSCULAR; INTRAVENOUS; SOFT TISSUE at 07:03

## 2023-03-10 RX ADMIN — SODIUM CHLORIDE, POTASSIUM CHLORIDE, SODIUM LACTATE AND CALCIUM CHLORIDE: 600; 310; 30; 20 INJECTION, SOLUTION INTRAVENOUS at 08:03

## 2023-03-10 RX ADMIN — SODIUM CHLORIDE: 9 INJECTION, SOLUTION INTRAVENOUS at 06:03

## 2023-03-10 RX ADMIN — LIDOCAINE HYDROCHLORIDE 60 MG: 20 INJECTION, SOLUTION EPIDURAL; INFILTRATION; INTRACAUDAL; PERINEURAL at 07:03

## 2023-03-10 RX ADMIN — HYDROMORPHONE HYDROCHLORIDE 0.5 MG: 2 INJECTION, SOLUTION INTRAMUSCULAR; INTRAVENOUS; SUBCUTANEOUS at 09:03

## 2023-03-10 RX ADMIN — HYDROCODONE BITARTRATE AND ACETAMINOPHEN 1 TABLET: 10; 325 TABLET ORAL at 10:03

## 2023-03-10 RX ADMIN — FENTANYL CITRATE 100 MCG: 50 INJECTION INTRAMUSCULAR; INTRAVENOUS at 07:03

## 2023-03-10 NOTE — DISCHARGE SUMMARY
[facility] - Periop Services  Discharge Note  Short Stay    Procedure(s) (LRB):  CHOLECYSTECTOMY, LAPAROSCOPIC (N/A)      OUTCOME: Patient tolerated treatment/procedure well without complication and is now ready for discharge.    DISPOSITION: Home or Self Care    FINAL DIAGNOSIS:  Gallstones with Chronic cholecystitis  FOLLOWUP: In clinic    DISCHARGE INSTRUCTIONS:    Discharge Procedure Orders   Diet general     Remove dressing in 48 hours     Call MD for:  temperature >100.4     Call MD for:  persistent nausea and vomiting     Call MD for:  severe uncontrolled pain     Call MD for:  difficulty breathing, headache or visual disturbances     Lifting restrictions     Shower on day dressing removed (No bath)         Clinical Reference Documents Added to Patient Instructions         Document    CHOLECYSTECTOMY DISCHARGE INSTRUCTIONS (ENGLISH)            TIME SPENT ON DISCHARGE: 15   minutes

## 2023-03-10 NOTE — ANESTHESIA PREPROCEDURE EVALUATION
03/10/2023  Jonathan Rodriguez is a 47 y.o., female.      Pre-op Assessment    I have reviewed the Patient Summary Reports.    I have reviewed the NPO Status.   I have reviewed the Medications.     Review of Systems         Anesthesia Plan  Type of Anesthesia, risks & benefits discussed:    Anesthesia Type: Gen ETT  Intra-op Monitoring Plan: Standard ASA Monitors  Post Op Pain Control Plan: IV/PO Opioids PRN  Induction:  IV  Informed Consent: Informed consent signed with the Patient and all parties understand the risks and agree with anesthesia plan.  All questions answered.   ASA Score: 3    Ready For Surgery From Anesthesia Perspective.     .  NPO greater than 8 hours  NAC  Allergic to ASA and lisinopril    Medical History   Hypertension Pseudoseizures   GERD  [place]  Previous hysterectomy    Airway exam deferred (COVID precautions); adequate ROM at neck.

## 2023-03-10 NOTE — ANESTHESIA POSTPROCEDURE EVALUATION
Anesthesia Post Evaluation    Patient: Jonathan Need    Procedure(s) Performed: Procedure(s) (LRB):  CHOLECYSTECTOMY, LAPAROSCOPIC (N/A)    Final Anesthesia Type: general      Patient location during evaluation: PACU  Post-procedure vital signs: reviewed and stable  Pain management: adequate  Airway patency: patent    PONV status at discharge: No PONV  Anesthetic complications: no      Cardiovascular status: hemodynamically stable  Respiratory status: unassisted  Hydration status: euvolemic  Follow-up not needed.          Vitals Value Taken Time   /93 03/10/23 1130   Temp 36.4 °C (97.5 °F) 03/10/23 0910   Pulse 78 03/10/23 1145   Resp 18 03/10/23 1145   SpO2 90 % 03/10/23 1145         Event Time   Out of Recovery 09:50:00         Pain/Elinor Score: Pain Rating Prior to Med Admin: 8 (3/10/2023 10:46 AM)  Pain Rating Post Med Admin: 8 (3/10/2023  9:45 AM)  Elionr Score: 10 (3/10/2023 11:30 AM)

## 2023-03-10 NOTE — ANESTHESIA PROCEDURE NOTES
Intubation    Date/Time: 3/10/2023 7:14 AM  Performed by: Jade Mgcee CRNA  Authorized by: Noe Urbina MD     Intubation:     Induction:  Intravenous    Intubated:  Postinduction    Mask Ventilation:  Easy with oral airway    Attempts:  1    Attempted By:  CRNA    Method of Intubation:  Direct    Blade:  Kerri 4    Laryngeal View Grade: Grade IIA - cords partially seen      Difficult Airway Encountered?: No      Complications:  None    Airway Device:  Oral endotracheal tube    Airway Device Size:  7.0    Style/Cuff Inflation:  Cuffed    Inflation Amount (mL):  7    Tube secured:  21    Placement Verified By:  Capnometry    Complicating Factors:  Obesity and small mouth    Findings Post-Intubation:  BS equal bilateral and atraumatic/condition of teeth unchanged

## 2023-03-10 NOTE — OP NOTE
[facility] - Periop Services     Operative Note    SUMMARY     Date of Procedure: 3/10/2023     Procedure: Procedure(s) (LRB):  CHOLECYSTECTOMY, LAPAROSCOPIC (N/A)     Surgeon(s) and Role:     * Josue Ramirez MD - Primary    Assisting Surgeon: None    Pre-Operative Diagnosis: Calculus of gallbladder with chronic cholecystitis without obstruction [K80.10]    Post-Operative Diagnosis: Post-Op Diagnosis Codes:     * Calculus of gallbladder with chronic cholecystitis without obstruction [K80.10]    Anesthesia: General    Technical Procedures Used: The patient was brought to the operating room and placed in supine position. General anesthesia was administered. The abdomen was prepped and draped in standard fashion. A supraumbilical incision was performed and dissection was carried down through subcutaneous tissue bluntly.  Fascial stay sutures were placed, and the fascia was sharply transected and the peritoneum was carefully entered. CO2 pneumoperitoneum was established after inserting Bria cannula. 5 mm trochars were then placed in the epigastrium, midepigastrium, and right upper quadrant. The fundus was retracted upward and over the liver, and the infundibulum was retracted laterally. The cystic duct and cystic artery were dissected out, clipped twice proximally, once distally and transected. Cautery was then used to dissect the gallbladder free from the surface of the liver.  During retraction, there was defect created in the gallbladder which drained bile.  This was all suctioned free with a suction .  There were a few small vessels in the liver bed which were clipped prior to transecting them.  Eventually the gallbladder was dissected free from the surface of the liver.  There was good hemostasis. Port sites were then infiltrated with local at the level of the fascia and peritoneum. The gallbladder was removed at the supraumbilical incision using an Endo-Catch bag.  Ports were then removed  and CO2 pneumoperitoneum was allowed to escape. The fascia of the supraumbilical incision was closed using interrupted PDS. All port sites closed skin level with subcuticular Monocryl.. Patient was awakened from anesthesia in stable condition.       Description of the Findings of the Procedure:  There were chronic adhesions of omentum to the gallbladder surface indicating chronic cholecystitis.  The duct and artery were well seen.  There were multiple small vessels in the liver bed which required clipping.  Gallbladder did leak some bile during the course of the dissection.  Operation was able to be completed laparoscopically.      Assistant(s): RAY Deluca    Complications: No    Estimated Blood Loss (EBL): 10           Implants: * No implants in log *    Specimens:   Gallbladder sent to pathology  Specimen (24h ago, onward)       Start     Ordered    03/10/23 0752  Surgical Pathology  RELEASE UPON ORDERING         03/10/23 0752                     Condition: Good      Complications:  None

## 2023-03-10 NOTE — TRANSFER OF CARE
Anesthesia Transfer of Care Note    Patient: Jonathan Need    Procedure(s) Performed: Procedure(s) (LRB):  CHOLECYSTECTOMY, LAPAROSCOPIC (N/A)    Patient location: PACU    Anesthesia Type: general    Transport from OR: Transported from OR on 6-10 L/min O2 by face mask with adequate spontaneous ventilation    Post pain: adequate analgesia    Post assessment: no apparent anesthetic complications    Post vital signs: stable    Level of consciousness: sedated    Nausea/Vomiting: no nausea/vomiting    Complications: none    Transfer of care protocol was followed      Last vitals:   Visit Vitals  BP (!) 157/101   Pulse 74   Temp 36.4 °C (97.5 °F)   Resp (!) 24   SpO2 (!) 93%   Breastfeeding No

## 2023-03-10 NOTE — OR NURSING
0901 REC'D PT TO PACU SLIGHTLY DROWSY. NAD NOTED. RESP EVEN & UNLABORED. O2 92 % ON 10L/FM W/ ORAL AIRWAY IN PLACE. REPOSITIONED HEAD O2 INCREASED TO 94%. BP W/I BASELINE. OTHER VSS. NO C/O PAIN VOICED AT THIS TIME. ABD DSG X4 C/D/I. 22G TO EMILY HANDS INTACT. SCD DEVICE TO BLE NOTED. WILL CONT TO MONITOR.     0919 UPDATED  ON PT STATUS VIA TEXT MESSAGE.    0920 ORAL AIRWAY REMOVED AN PLACED ON 2L/NC. O2 94%.     0930 DILAUDID 0.5 MG IVP GIVEN FOR C/O ABD PAIN AT INCISION SITE RATED 8/10 ON PAIN SCALE. WILL TITRATE FOR PAIN CONTROL.     1000 RETURNED PT TO ASC #5 IN STABLE CONDITION. /98 HR 63 O2 95% ON 2L/NC.

## 2023-03-13 LAB
ESTROGEN SERPL-MCNC: NORMAL PG/ML
INSULIN SERPL-ACNC: NORMAL U[IU]/ML
LAB AP GROSS DESCRIPTION: NORMAL
LAB AP LABORATORY NOTES: NORMAL
T3RU NFR SERPL: NORMAL %

## 2023-03-24 ENCOUNTER — OFFICE VISIT (OUTPATIENT)
Dept: SURGERY | Facility: CLINIC | Age: 48
End: 2023-03-24
Attending: SURGERY
Payer: COMMERCIAL

## 2023-03-24 VITALS
TEMPERATURE: 98 F | BODY MASS INDEX: 46.28 KG/M2 | SYSTOLIC BLOOD PRESSURE: 138 MMHG | RESPIRATION RATE: 20 BRPM | DIASTOLIC BLOOD PRESSURE: 70 MMHG | HEART RATE: 88 BPM | WEIGHT: 288 LBS | HEIGHT: 66 IN | OXYGEN SATURATION: 96 %

## 2023-03-24 DIAGNOSIS — M54.16 LUMBAR RADICULOPATHY: Primary | ICD-10-CM

## 2023-03-24 DIAGNOSIS — Z09 POSTOP CHECK: Primary | ICD-10-CM

## 2023-03-24 PROCEDURE — 3008F BODY MASS INDEX DOCD: CPT | Mod: CPTII,,, | Performed by: SURGERY

## 2023-03-24 PROCEDURE — 99024 POSTOP FOLLOW-UP VISIT: CPT | Mod: ,,, | Performed by: SURGERY

## 2023-03-24 PROCEDURE — 1159F MED LIST DOCD IN RCRD: CPT | Mod: CPTII,,, | Performed by: SURGERY

## 2023-03-24 PROCEDURE — 99214 OFFICE O/P EST MOD 30 MIN: CPT | Mod: PBBFAC | Performed by: SURGERY

## 2023-03-24 PROCEDURE — 1159F PR MEDICATION LIST DOCUMENTED IN MEDICAL RECORD: ICD-10-PCS | Mod: CPTII,,, | Performed by: SURGERY

## 2023-03-24 PROCEDURE — 99024 PR POST-OP FOLLOW-UP VISIT: ICD-10-PCS | Mod: ,,, | Performed by: SURGERY

## 2023-03-24 PROCEDURE — 3008F PR BODY MASS INDEX (BMI) DOCUMENTED: ICD-10-PCS | Mod: CPTII,,, | Performed by: SURGERY

## 2023-03-24 PROCEDURE — 3075F SYST BP GE 130 - 139MM HG: CPT | Mod: CPTII,,, | Performed by: SURGERY

## 2023-03-24 PROCEDURE — 3078F PR MOST RECENT DIASTOLIC BLOOD PRESSURE < 80 MM HG: ICD-10-PCS | Mod: CPTII,,, | Performed by: SURGERY

## 2023-03-24 PROCEDURE — 3078F DIAST BP <80 MM HG: CPT | Mod: CPTII,,, | Performed by: SURGERY

## 2023-03-24 PROCEDURE — 3075F PR MOST RECENT SYSTOLIC BLOOD PRESS GE 130-139MM HG: ICD-10-PCS | Mod: CPTII,,, | Performed by: SURGERY

## 2023-03-27 ENCOUNTER — HOSPITAL ENCOUNTER (OUTPATIENT)
Dept: RADIOLOGY | Facility: HOSPITAL | Age: 48
Discharge: HOME | End: 2023-03-27
Attending: ORTHOPAEDIC SURGERY
Payer: COMMERCIAL

## 2023-03-27 ENCOUNTER — OFFICE VISIT (OUTPATIENT)
Dept: SPINE | Facility: CLINIC | Age: 48
End: 2023-03-27
Attending: SURGERY
Payer: COMMERCIAL

## 2023-03-27 DIAGNOSIS — M54.16 LUMBAR RADICULOPATHY: ICD-10-CM

## 2023-03-27 DIAGNOSIS — M54.16 ACUTE LEFT LUMBAR RADICULOPATHY: ICD-10-CM

## 2023-03-27 DIAGNOSIS — M54.16 LUMBAR RADICULOPATHY: Primary | ICD-10-CM

## 2023-03-27 PROCEDURE — 99204 OFFICE O/P NEW MOD 45 MIN: CPT | Mod: S$PBB,,, | Performed by: ORTHOPAEDIC SURGERY

## 2023-03-27 PROCEDURE — 72110 XR LUMBAR SPINE 4-5 VIEW WITH BENDING VIEWS: ICD-10-PCS | Mod: 26,,, | Performed by: ORTHOPAEDIC SURGERY

## 2023-03-27 PROCEDURE — 99204 PR OFFICE/OUTPT VISIT, NEW, LEVL IV, 45-59 MIN: ICD-10-PCS | Mod: S$PBB,,, | Performed by: ORTHOPAEDIC SURGERY

## 2023-03-27 PROCEDURE — 99213 OFFICE O/P EST LOW 20 MIN: CPT | Mod: PBBFAC | Performed by: ORTHOPAEDIC SURGERY

## 2023-03-27 PROCEDURE — 72110 X-RAY EXAM L-2 SPINE 4/>VWS: CPT | Mod: TC

## 2023-03-27 PROCEDURE — 72110 X-RAY EXAM L-2 SPINE 4/>VWS: CPT | Mod: 26,,, | Performed by: ORTHOPAEDIC SURGERY

## 2023-03-27 RX ORDER — PREGABALIN 75 MG/1
75 CAPSULE ORAL 2 TIMES DAILY
Qty: 60 CAPSULE | Refills: 5 | Status: SHIPPED | OUTPATIENT
Start: 2023-03-27 | End: 2023-12-06

## 2023-03-27 NOTE — PROGRESS NOTES
AP, lateral, flexion/extension views of the lumbar spine reviewed    On the AP there is an oblique takeoff to the left from the lumbosacral junction.  There are 5 non-rib-bearing lumbar vertebrae.  On the lateral there is decreased lumbar lordosis.  There is spondylotic disease with decreased disc height and osteophyte formation noted.  No fractures or listhesis noted.  No instability on flexion-extension views.    Impression:  Spondylotic changes of the lumbar spine as noted above

## 2023-03-27 NOTE — PROGRESS NOTES
MDM/time:  Greater than 45 minutes spent on this encounter including 15 minutes reviewing imaging and notes, 20 minutes with the patient, 10 minutes documentation    ASSESSMENT:  47 y.o. female with lumbar spondylosis with radiculopathy    PLAN:  PT lumbar spine   Lyrica 75mg 1 tab BID   Morbid obesity - discussed wt loss management   Follow up 4 month      HPI:  47 y.o. female here for evaluation of lower back pain that radiates into left hip and down the left leg.  No known injury but recently.  Patient reports pain has come and gone has pain approximately 10 years ago had to have injections and a rhizotomies that did give her pain relief.  Since February she has had increased pain with standing or sitting for any length of time.  Approximately 2 weeks ago she had her gallbladder removed with Dr. Ramirez.  No difficulty with  strength.  She is with balance and has had 2 falls.  No bladder or bowel incontinence.  Difficulty walking more than 10 minutes due to pain.  Currently takes Aleve as needed for pain.  No recent physical therapy.  No prior pain management.  No recent MRI.  No prior spine surgery.  Patient is not a smoker.    IMAGING:  AP, lateral, flexion/extension views of the lumbar spine reviewed     On the AP there is an oblique takeoff to the left from the lumbosacral junction.  There are 5 non-rib-bearing lumbar vertebrae.  On the lateral there is decreased lumbar lordosis.  There is spondylotic disease with decreased disc height and osteophyte formation noted.  No fractures or listhesis noted.  No instability on flexion-extension views.     Impression:  Spondylotic changes of the lumbar spine as noted above    Past Medical History:   Diagnosis Date    Hypertension     Pseudoseizures      Past Surgical History:   Procedure Laterality Date    bladder tack      HYSTERECTOMY      LAPAROSCOPIC CHOLECYSTECTOMY N/A 3/10/2023    Procedure: CHOLECYSTECTOMY, LAPAROSCOPIC;  Surgeon: Josue Ramirez MD;   Location: [facility], [place];  Service: General;  Laterality: N/A;     Social History     Tobacco Use    Smoking status: Never    Smokeless tobacco: Never   Substance Use Topics    Alcohol use: Never    Drug use: Never      Current Outpatient Medications   Medication Instructions    albuterol (VENTOLIN HFA) 90 mcg/actuation inhaler 2 puffs, Inhalation, Every 6 hours PRN, Rescue    ALPRAZolam (XANAX) 0.5 mg, Oral, Every other day    HYDROcodone-acetaminophen (NORCO) 7.5-325 mg per tablet 1 tablet, Oral, Every 6 hours PRN    ibuprofen (ADVIL,MOTRIN) 800 mg, Oral, 3 times daily    metoprolol succinate (TOPROL-XL) 50 mg, Oral, 2 times daily    pantoprazole (PROTONIX) 40 mg, Oral, Daily    sertraline (ZOLOFT) 25 mg, Oral, Daily    sertraline (ZOLOFT) 50 mg, Oral, Every morning    tiZANidine (ZANAFLEX) 4 mg, Oral, Every 8 hours PRN    triamterene-hydrochlorothiazide 37.5-25 mg (DYAZIDE) 37.5-25 mg per capsule 1 capsule, Oral, Every morning        EXAM:  Constitutional  General Appearance:  There is no height or weight on file to calculate BMI., NAD  Psychiatric   Orientation: Oriented to time, oriented to place, oriented to person  Mood and Affect: Active and alert, normal mood, normal affect  Gait and Station   Appearance:  Antalgic gait to the left, unable to tandem gait, unable to walk on toes, unable to walk on heels    LUMBAR  Musculoskeletal System   Hips: Normal appearance, no leg length discrepancy, normal motion; left, normal motion; right    Lumbar Spine                   Inspection:  Normal alignment, normal sagittal balance                  Range of motion:  Decreased flexion, extension, lateral bending, rotation. Pain with range of motion                  Bony Palpation of the Lumbar Spine:  No tenderness of the spinous process, no tenderness of the sacrum, no tenderness of the coccyx                  Bony Palpation of the Right Hip:  No tenderness of the iliac crest, no tenderness of the sciatic notch, no  tenderness of the SI joint                  Bony Palpation of the Left Hip:  No tenderness of the iliac crest, no tenderness of the sciatic notch, no tenderness of the SI joint                  Soft Tissue Palpation on the Right:  No tenderness of the paraspinal region, no tenderness of the iliolumbar region                  Soft Tissue Palpation on the Left:  No tenderness of the paraspinal region, no tenderness of the iliolumbar region    Motor Strength   L1 Right:  Hip flexion iliopsoas 5/5    L1 Left:  Hip flexion iliopsoas 5/5              L2-L4 Right:  Knee extension quadriceps 5/5, tibialis anterior 5/5              L2-L4 Left:  Knee extension quadriceps 5/5, tibialis anterior 5/5   L5 Right:  Extensor hallucis llongus 5/5,    L5 Left:  Extensor hallucis longus 5/5,    S1 Right:  Plantar flexion gastrocnemius 5/5   S1 Left:  Plantar flexion gastrocnemius 5/5    Neurological System   Ankle Reflex Right:  normal   Ankle Reflex Left: normal   Knee Reflex Right:  normal   Knee Reflex Left:  normal   Sensation on the Right:  L2 normal, L3 normal, L4 normal, L5 normal, S1 normal   Sensation on the Left:  L2 normal, L3 normal, L4 normal, L5 normal, S1 normal              Special Test on the Right:  Seated straight leg raising test negative, no clonus of the ankle              Special Test on the Left:  Seated straight leg raising test negative, no clonus of the ankle    Skin   Lumbosacral Spine:  Normal skin    Cardiovascular System   Arterial Pulses Right:  Posterior tibialis normal, dorsalis pedis normal   Arterial Pulses Left:  Posterior tibialis normal, dorsalis pedis normal   Edema Right: None   Edema Left:  None

## 2023-03-28 ENCOUNTER — OFFICE VISIT (OUTPATIENT)
Dept: GASTROENTEROLOGY | Facility: CLINIC | Age: 48
End: 2023-03-28
Attending: SURGERY
Payer: COMMERCIAL

## 2023-03-28 VITALS
SYSTOLIC BLOOD PRESSURE: 164 MMHG | HEART RATE: 89 BPM | BODY MASS INDEX: 45.96 KG/M2 | DIASTOLIC BLOOD PRESSURE: 106 MMHG | HEIGHT: 66 IN | WEIGHT: 286 LBS | OXYGEN SATURATION: 96 %

## 2023-03-28 DIAGNOSIS — K59.00 CONSTIPATION, UNSPECIFIED CONSTIPATION TYPE: ICD-10-CM

## 2023-03-28 DIAGNOSIS — K21.9 GASTROESOPHAGEAL REFLUX DISEASE, UNSPECIFIED WHETHER ESOPHAGITIS PRESENT: ICD-10-CM

## 2023-03-28 DIAGNOSIS — R13.10 DYSPHAGIA, UNSPECIFIED TYPE: Primary | ICD-10-CM

## 2023-03-28 DIAGNOSIS — Z12.11 SCREENING FOR COLON CANCER: ICD-10-CM

## 2023-03-28 PROCEDURE — 1159F PR MEDICATION LIST DOCUMENTED IN MEDICAL RECORD: ICD-10-PCS | Mod: CPTII,,,

## 2023-03-28 PROCEDURE — 3008F BODY MASS INDEX DOCD: CPT | Mod: CPTII,,,

## 2023-03-28 PROCEDURE — 99214 OFFICE O/P EST MOD 30 MIN: CPT | Mod: PBBFAC

## 2023-03-28 PROCEDURE — 3077F SYST BP >= 140 MM HG: CPT | Mod: CPTII,,,

## 2023-03-28 PROCEDURE — 3080F DIAST BP >= 90 MM HG: CPT | Mod: CPTII,,,

## 2023-03-28 PROCEDURE — 99214 PR OFFICE/OUTPT VISIT, EST, LEVL IV, 30-39 MIN: ICD-10-PCS | Mod: S$PBB,,,

## 2023-03-28 PROCEDURE — 1160F PR REVIEW ALL MEDS BY PRESCRIBER/CLIN PHARMACIST DOCUMENTED: ICD-10-PCS | Mod: CPTII,,,

## 2023-03-28 PROCEDURE — 1160F RVW MEDS BY RX/DR IN RCRD: CPT | Mod: CPTII,,,

## 2023-03-28 PROCEDURE — 3077F PR MOST RECENT SYSTOLIC BLOOD PRESSURE >= 140 MM HG: ICD-10-PCS | Mod: CPTII,,,

## 2023-03-28 PROCEDURE — 3080F PR MOST RECENT DIASTOLIC BLOOD PRESSURE >= 90 MM HG: ICD-10-PCS | Mod: CPTII,,,

## 2023-03-28 PROCEDURE — 99214 OFFICE O/P EST MOD 30 MIN: CPT | Mod: S$PBB,,,

## 2023-03-28 PROCEDURE — 1159F MED LIST DOCD IN RCRD: CPT | Mod: CPTII,,,

## 2023-03-28 PROCEDURE — 3008F PR BODY MASS INDEX (BMI) DOCUMENTED: ICD-10-PCS | Mod: CPTII,,,

## 2023-03-28 RX ORDER — OMEPRAZOLE 20 MG/1
20 TABLET, DELAYED RELEASE ORAL DAILY
COMMUNITY
End: 2023-03-28 | Stop reason: ALTCHOICE

## 2023-03-28 RX ORDER — PANTOPRAZOLE SODIUM 40 MG/1
40 TABLET, DELAYED RELEASE ORAL DAILY
Qty: 30 TABLET | Refills: 2 | Status: SHIPPED | OUTPATIENT
Start: 2023-03-28 | End: 2023-11-15 | Stop reason: SDUPTHER

## 2023-03-28 RX ORDER — TRIAMTERENE/HYDROCHLOROTHIAZID 37.5-25 MG
1 TABLET ORAL DAILY
COMMUNITY
End: 2023-11-15 | Stop reason: SDUPTHER

## 2023-03-28 RX ORDER — METOPROLOL TARTRATE 50 MG/1
1 TABLET ORAL 2 TIMES DAILY
COMMUNITY
End: 2023-11-15 | Stop reason: SDUPTHER

## 2023-03-28 RX ORDER — NAPROXEN SODIUM 220 MG
220 TABLET ORAL DAILY PRN
COMMUNITY
End: 2023-12-06

## 2023-03-28 NOTE — PROGRESS NOTES
Jonathan Rodriguez is a 47 y.o. female here for Constipation        PCP: Primary Doctor No  Referring Provider: Josue Ramirez Md  [address] [place] [id number]     HPI:  Patient is a 48 yo female who presents to clinic today with dysphagia, increased reflux symptoms, and constipation. Patient reports upper abdominal pain. Since ED visit 02/2023, patient has taken Miralax for constipation, added a fiber supplement daily, increased her water intake, and had a Cholecystectomy 2 weeks ago with Dr. Ramirez. She has also changed her diet and is now doing low-carb diet. She has experienced diarrhea since surgery with urgency, but these symptoms are improving. She denies use of NSAIDs. Denies alcohol. She has never had screening colonoscopy.     Family history paternal uncles (all 3 of dads brothers).    Constipation  Associated symptoms include abdominal pain (cramping with BM) and diarrhea. Pertinent negatives include no nausea or vomiting.       ROS:  Review of Systems   HENT:  Positive for trouble swallowing.    Gastrointestinal:  Positive for abdominal pain (cramping with BM), constipation, diarrhea and reflux. Negative for blood in stool, nausea and vomiting.   Integumentary:  Negative for color change.        PMHX:  has a past medical history of Anxiety disorder, unspecified, GERD (gastroesophageal reflux disease), Hypertension, Lumbar pain, Pseudoseizures, and Unspecified osteoarthritis, unspecified site.    PSHX:  has a past surgical history that includes Hysterectomy; bladder tack; and Laparoscopic cholecystectomy (N/A, 3/10/2023).    PFHX: family history includes Cancer in her paternal aunt and paternal uncle; Diabetes in her father, maternal aunt, maternal grandfather, and mother; Heart disease in her maternal grandfather, maternal grandmother, paternal grandfather, and paternal grandmother; Hypertension in her mother and sister.    PSlHX:  reports that she has never smoked. She has never used smokeless tobacco.  She reports that she does not drink alcohol and does not use drugs.        Review of patient's allergies indicates:   Allergen Reactions    Neurontin [gabapentin] Shortness Of Breath    Lisinopril Other (See Comments)     cough    Aspirin Nausea And Vomiting       Medication List with Changes/Refills   Current Medications    ALPRAZOLAM (XANAX) 0.5 MG TABLET    Take 0.5 mg by mouth daily as needed.    METOPROLOL TARTRATE (LOPRESSOR) 50 MG TABLET    Take 1 tablet by mouth 2 (two) times a day.    NAPROXEN SODIUM (ANAPROX) 220 MG TABLET    Take 220 mg by mouth daily as needed.    PREGABALIN (LYRICA) 75 MG CAPSULE    Take 1 capsule (75 mg total) by mouth 2 (two) times daily.    SERTRALINE (ZOLOFT) 50 MG TABLET    Take 50 mg by mouth every morning.    TRIAMTERENE-HYDROCHLOROTHIAZIDE 37.5-25 MG (MAXZIDE-25) 37.5-25 MG PER TABLET    Take 1 tablet by mouth Daily.   Changed and/or Refilled Medications    Modified Medication Previous Medication    PANTOPRAZOLE (PROTONIX) 40 MG TABLET pantoprazole (PROTONIX) 40 MG tablet       Take 1 tablet (40 mg total) by mouth once daily.    Take 1 tablet (40 mg total) by mouth once daily.   Discontinued Medications    ALBUTEROL (VENTOLIN HFA) 90 MCG/ACTUATION INHALER    Inhale 2 puffs into the lungs every 6 (six) hours as needed for Wheezing. Rescue    HYDROCODONE-ACETAMINOPHEN (NORCO) 7.5-325 MG PER TABLET    Take 1 tablet by mouth every 6 (six) hours as needed for Pain.    IBUPROFEN (ADVIL,MOTRIN) 800 MG TABLET    Take 1 tablet (800 mg total) by mouth 3 (three) times daily.    METOPROLOL SUCCINATE (TOPROL-XL) 50 MG 24 HR TABLET    Take 1 tablet (50 mg total) by mouth 2 (two) times daily.    OMEPRAZOLE (PRILOSEC OTC) 20 MG TABLET    Take 20 mg by mouth once daily.    SERTRALINE (ZOLOFT) 25 MG TABLET    Take 25 mg by mouth once daily.    TIZANIDINE (ZANAFLEX) 4 MG TABLET    Take 1 tablet (4 mg total) by mouth every 8 (eight) hours as needed (Muscle Spasm).    TRIAMTERENE-HYDROCHLOROTHIAZIDE  "37.5-25 MG (DYAZIDE) 37.5-25 MG PER CAPSULE    Take 1 capsule by mouth every morning.        Objective Findings:  Vital Signs:  BP (!) 164/106   Pulse 89   Ht 5' 6" (1.676 m)   Wt 129.7 kg (286 lb)   SpO2 96%   BMI 46.16 kg/m²  Body mass index is 46.16 kg/m².    Physical Exam:  Physical Exam  Vitals reviewed.   Constitutional:       General: She is not in acute distress.     Appearance: Normal appearance.   HENT:      Mouth/Throat:      Mouth: Mucous membranes are moist.   Cardiovascular:      Rate and Rhythm: Normal rate and regular rhythm.      Pulses: Normal pulses.   Pulmonary:      Effort: Pulmonary effort is normal.      Breath sounds: Normal breath sounds.   Abdominal:      General: Bowel sounds are normal. There is no distension.      Palpations: Abdomen is soft. There is no mass.      Tenderness: There is no abdominal tenderness. There is no guarding.   Musculoskeletal:         General: Normal range of motion.   Skin:     General: Skin is warm and dry.   Neurological:      Mental Status: She is alert and oriented to person, place, and time. Mental status is at baseline.   Psychiatric:         Mood and Affect: Mood normal.        Labs:  Lab Results   Component Value Date    WBC 11.58 (H) 02/06/2023    HGB 14.0 02/06/2023    HCT 41.4 02/06/2023    MCV 85.7 02/06/2023    RDW 13.2 02/06/2023     02/06/2023    LYMPH 25.8 (L) 02/06/2023    LYMPH 2.99 02/06/2023    MONO 9.1 (H) 02/06/2023    EOS 0.17 02/06/2023    BASO 0.09 02/06/2023     Lab Results   Component Value Date     02/06/2023    K 3.6 02/06/2023     02/06/2023    CO2 30 02/06/2023    GLU 90 02/06/2023    BUN 11 02/06/2023    CREATININE 0.94 02/06/2023    CALCIUM 8.9 02/06/2023    PROT 7.1 02/06/2023    ALBUMIN 3.7 02/06/2023    BILITOT 0.7 02/06/2023    ALKPHOS 117 (H) 02/06/2023    AST 18 02/06/2023    ALT 32 02/06/2023         Imaging: X-Ray Lumbar 4-5 View including Bending Views    Result Date: 3/27/2023  See Procedure Notes " for results. IMPRESSION: Please see Ortho procedure notes for report.  This procedure was auto-finalized by: Virtual Radiologist        Assessment:  Jonathan Rodriguez is a 47 y.o. female here with:  1. Dysphagia, unspecified type    2. Constipation, unspecified constipation type    3. Screening for colon cancer    4. Gastroesophageal reflux disease, unspecified whether esophagitis present          Recommendations:  1. Protonix 40 mg daily  2. Schedule EGD  3. Schedule screening colonoscopy     Follow up in about 6 months (around 9/28/2023).      Order summary:  Orders Placed This Encounter    pantoprazole (PROTONIX) 40 MG tablet    EGD w Dilation    Colonoscopy       Thank you for allowing me to participate in the care of Jonathan Rodriguez.      Sarah Anguiano, FNP-BC, CATRACHITA-ACNP-BC

## 2023-03-28 NOTE — PATIENT INSTRUCTIONS
I recommend a bowel cleanse with a gatorade miralax prep: take 20 mg of dulcolax orally and then mix 238 grams of miralax in 64 oz of your favorite zero sugar gatorade and drink over a 4 hour period on a day when you will be at home  After your bowel cleanse, I recommend starting a daily fiber supplement with Citrucel or Fibercon (can purchase at your local pharmacy)  I recommend that you also use Miralax 17 grams daily-to-twice daily as needed to have a regular, soft BM without straining - you can adjust how often you need miralax, but start with daily dosing and go from there  I recommend drinking at least 60-80 ounces of water daily unless you have a medical condition that requires fluid restriction

## 2023-04-20 ENCOUNTER — CLINICAL SUPPORT (OUTPATIENT)
Dept: REHABILITATION | Facility: HOSPITAL | Age: 48
End: 2023-04-20
Payer: COMMERCIAL

## 2023-04-20 DIAGNOSIS — M54.16 LUMBAR RADICULOPATHY: ICD-10-CM

## 2023-04-20 PROCEDURE — 97161 PT EVAL LOW COMPLEX 20 MIN: CPT
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
## 2023-04-27 ENCOUNTER — CLINICAL SUPPORT (OUTPATIENT)
Dept: REHABILITATION | Facility: HOSPITAL | Age: 48
End: 2023-04-27
Payer: COMMERCIAL

## 2023-04-27 DIAGNOSIS — M54.16 LUMBAR RADICULOPATHY: Primary | ICD-10-CM

## 2023-04-27 PROCEDURE — 97110 THERAPEUTIC EXERCISES: CPT | Mod: CQ

## 2023-04-27 PROCEDURE — 97112 NEUROMUSCULAR REEDUCATION: CPT | Mod: CQ

## 2023-04-27 NOTE — PROGRESS NOTES
[facility]   Physical Therapy Treatment Note     Name: Jonathan Rodriguez  Clinic Number: [id number]  Physician: Kulwant Ellis MD  Visit Date: 4/27/2023  Therapy Diagnosis: Lumbar radiculopathy        Physician Orders: PT Eval and Treat   Medical Diagnosis from Referral: Lumbar radiculopathy   Evaluation Date: 4/20/2023  Authorization Period Expiration: 3/26/2024  Plan of Care Expiration: 06/01/2023                          Visit # / Visits authorized: 2 / unlimited  FOTO:1/3     Precautions: Standard and Diabetes    PTA Visit #: 1/5     Time In: 2:40 pm  Time Out: 3:20 pm   Total Billable Time: 40 minutes    SUBJECTIVE     Pt reports: pain has not been real bad today. Reports doing good with her exercises at home that she was given at evaluation.   She was compliant with home exercise program.  Response to previous treatment: first visit since evaluation   Functional change: n/a    Pain: 1/10  Location: bilateral back and left leg    OBJECTIVE     Objective Measures updated at progress report unless specified.     Treatment     Jonathan received the treatments listed below:      therapeutic exercises to develop strength, endurance, ROM, flexibility, posture, and core stabilization for 25 minutes including:     TherEx 4/20/2023     Bike  x 5 minutes    Slantboard stretch  x 3 x 20 second hold    Hamstring stretch on step  x 2 x 20 second hold ea (bilateral)   Seated piriformis stretch  x 2 x 20 second hold bilateral    Cybex back extension  x 5 plates 3 x 10        Posterior Pelvic Tilt       SKTC       DKTC with TrA bracing  X    x 15 with 5 yellow swiss ball   Supine Brace March       Supine Sciatic Nerve Glide         neuromuscular re-education activities to improve: Coordination, Kinesthetic, Sense, and Posture for 15 minutes. The following activities were included:  Bilateral extension with retraction 3 x 10 green tband   Hooklying Bridge with TrA bracing 10x 5 second hold green  theraband  Longbridging over yellow swiss ball 10 x 10 second hold     therapeutic activities to improve functional performance for -  minutes, including:  -      Patient Education and Home Exercises     Home Exercises Provided and Patient Education Provided     Education provided:   - home exercise program review    Written Home Exercises Provided: Patient instructed to cont prior HEP. Exercises were reviewed and Jonathan was able to demonstrate them prior to the end of the session.  Jonathan demonstrated good  understanding of the education provided. See EMR under Patient Instructions for exercises provided during therapy sessions    ASSESSMENT     Supervisory visit with Kathleen Onofre PT prior to initial PTA visit.     Patient arrived today for initial visit after evaluation with reports of compliance with home exercise program. She reports the pain is better but still there across her back and left hip. Therapist reviewed given home exercise program from evaluation and added in exercises centered around strengthening of postural musculature as well as pelvic tilts with neutral spine awareness during mat activities. Patient fatigued at conclusion of treatment today. Therapist informed Patient we will slowly progress her as tolerated when she returns to further increase strength and functional mobility.       PMH from evaluation:   Jonathan is a 47 y.o. female referred to outpatient Physical Therapy with a medical diagnosis of Lumbar radiculopathy [M54.16]. Jonathan presents with clinical signs and symptoms that support this diagnosis with decreased Lumbar ROM, decreased lower extremity strength, Lumbar joint(s) hypomobility, lower extremity neural tension, and impaired functional mobility. Radicular symptoms are present down into the Left Lower Extremity.The above impairments will be addressed through manual therapy techniques, therapeutic exercises, functional training, and modalities as necessary. Patient was treated and  educated on exercises for home program, progression of therapy, and benefits of therapy to achieve full functional mobility. Patient will benefit from skilled physical therapy to meet short and long term goals and return to prior level of function.     Jonathan Is progressing towards her goals.   Pt prognosis is Good.     Pt will continue to benefit from skilled outpatient physical therapy to address the deficits listed in the problem list box on initial evaluation, provide pt/family education and to maximize pt's level of independence in the home and community environment.     Pt's spiritual, cultural and educational needs considered and pt agreeable to plan of care and goals.     Anticipated Barriers for therapy: co-morbidities, sedentary lifestyle, and chronicity of condition     Clinical Presentation stable and uncomplicated low   Decision Making/ Complexity Score: low      GOALS:  SHORT TERM GOALS: 3 weeks     Recent signs and systems trend is improving in order to progress towards LTG's.     Patient will be independent with HEP in order to further progress and return to maximal function.     Patient will improve AROM to 50% of stated goals, listed in objective measures above, in order to progress towards independence with functional activities.      Pain rating at Worst: 5/10 in order to progress towards increased independence with activity.     Patient will be able to correct postural deviations in sitting and standing, to decrease pain and promote postural awareness for injury prevention.         LONG TERM GOALS: 6 weeks     Patient will return to normal ADL, recreational, and work related activities with less pain and limitation.      Patient will improve AROM to stated goals in order to return to maximal functional potential.      Patient will improve Strength to stated goals of appropriate musculature in order to improve functional independence.      Pain Rating at Best: 1/10 to improve Quality of Life.                Patient will meet predicted functional outcome (FOTO) score: 5% to increase self-worth & perceived functional ability.                 PM=Partially Met     PC=Progressing/Continued     M=Met    PLAN     Plan of care Certification: 4/20/2023 to 6/1/2023.     Outpatient Physical Therapy 2 times weekly for 6 weeks to include any combination of the following interventions: virtual visits, dry needling, modalities, electrical stimulation (IFC, Pre-Mod, Attended with Functional Dry Needling), Cervical/Lumbar Traction, Electrical Stimulation , Manual Therapy, Moist Heat/ Ice, Neuromuscular Re-ed, Patient Education, Self Care, Therapeutic Activities, Therapeutic Exercise, Functional Training, and Therapeutic Activites        Wellington Carty, PTA   04/27/2023

## 2023-05-08 ENCOUNTER — CLINICAL SUPPORT (OUTPATIENT)
Dept: REHABILITATION | Facility: HOSPITAL | Age: 48
End: 2023-05-08
Payer: COMMERCIAL

## 2023-05-08 DIAGNOSIS — M54.16 LUMBAR RADICULOPATHY: Primary | ICD-10-CM

## 2023-05-08 PROCEDURE — 97110 THERAPEUTIC EXERCISES: CPT | Mod: CQ

## 2023-05-08 PROCEDURE — 97112 NEUROMUSCULAR REEDUCATION: CPT | Mod: CQ

## 2023-05-08 NOTE — PROGRESS NOTES
[facility]   Physical Therapy Treatment Note     Name: Jonathan Rodriguez  Clinic Number: [id number]  Physician: Kulwant Ellis MD  Visit Date: 5/8/2023  Therapy Diagnosis: Lumbar radiculopathy     Physician Orders: PT Eval and Treat   Medical Diagnosis from Referral: Lumbar radiculopathy   Evaluation Date: 4/20/2023  Authorization Period Expiration: 3/26/2024  Plan of Care Expiration: 06/01/2023                          Visit # / Visits authorized: 3 / unlimited  FOTO:1/3     PTA Visit #: 2/5     Time In: 3:36 pm  Time Out: 4:29 pm   Total Billable Time: 53 minutes    SUBJECTIVE     Pt reports: reports 2/10 pain today coming in. Reports her pain usually is worse in the mornings. Was in [place] over the weekend and reports she did not do her stretches and could tell because her pain was worse. She reports when she does her stretches she feels better.  Reports the left leg is doing better but the middle of the back is where her pain has been lately.     She was compliant with home exercise program.  Response to previous treatment: soreness noted  Functional change: n/a    Pain: 2/10  Location: bilateral back and left leg    OBJECTIVE     Objective Measures updated at progress report unless specified.     Treatment     Jonathan received the treatments listed below:      therapeutic exercises to develop strength, endurance, ROM, flexibility, posture, and core stabilization for 25 minutes including:     TherEx 4/20/2023     Bike  x 5 minutes    Slantboard stretch  x 3 x 20 second hold    Hamstring stretch on step  x 3 x 20 second hold ea (bilateral)   Seated piriformis stretch  x 2 x 20 second hold bilateral    Cybex back extension  x 5 plates 3 x 10   Cybex bilateral leg press  x 7 plates 3 x 10   Cybex hamstring curls  x 4 plates 3 x 10   Posterior Pelvic Tilt       SKTC            Supine Brace March       Supine Sciatic Nerve Glide       neuromuscular re-education activities to improve:  Coordination, Kinesthetic, Sense, and Posture for 23 minutes. The following activities were included:    DKTC with TrA bracing x 20 with 5 second hold yellow swiss ball  Hooklying LTR x 20 bilateral with 3 second hold   Hooklying Bridge with abduction  20 x 5 second hold green theraband  Hooklying bridge with march 20 x 5 second hold green theraband   Longbridging over yellow swiss ball 10 x 5 second hold     therapeutic activities to improve functional performance for -  minutes, including:  -      Patient Education and Home Exercises     Home Exercises Provided and Patient Education Provided     Education provided:   - home exercise program review with addition of seated piriformis stretch    Written Home Exercises Provided: Patient instructed to cont prior HEP. Exercises were reviewed and Jonathan was able to demonstrate them prior to the end of the session.  Jonathan demonstrated good  understanding of the education provided. See EMR under Patient Instructions for exercises provided during therapy sessions    ASSESSMENT     Supervisory visit with Kathleen Onofre PT prior to initial PTA visit.     Patient arrived today with reports of feeling a little better. She reports she was in [place] over the weekend and did not have a chance to do her home exercise program. She reports when she does her exercises she feels better. She reports the pain is better but still there across her back and left hip. Therapist added seated piriformis stretch to home exercise program for Patient to do at school when she can. Added cybex bilateral leg press and cybex hamstring curls today with continued focus of strengthening lumbar/ lower extremity musculature.  Patient fatigued at conclusion of treatment today. Therapist informed Patient we will progress her as tolerated when she returns to further increase strength and functional mobility.       PMH from evaluation:   Jonathan is a 47 y.o. female referred to outpatient Physical Therapy with a  medical diagnosis of Lumbar radiculopathy [M54.16]. Jonathan presents with clinical signs and symptoms that support this diagnosis with decreased Lumbar ROM, decreased lower extremity strength, Lumbar joint(s) hypomobility, lower extremity neural tension, and impaired functional mobility. Radicular symptoms are present down into the Left Lower Extremity.The above impairments will be addressed through manual therapy techniques, therapeutic exercises, functional training, and modalities as necessary. Patient was treated and educated on exercises for home program, progression of therapy, and benefits of therapy to achieve full functional mobility. Patient will benefit from skilled physical therapy to meet short and long term goals and return to prior level of function.     Jonathan Is progressing towards her goals.   Pt prognosis is Good.     Pt will continue to benefit from skilled outpatient physical therapy to address the deficits listed in the problem list box on initial evaluation, provide pt/family education and to maximize pt's level of independence in the home and community environment.     Pt's spiritual, cultural and educational needs considered and pt agreeable to plan of care and goals.     Anticipated Barriers for therapy: co-morbidities, sedentary lifestyle, and chronicity of condition     Clinical Presentation stable and uncomplicated low   Decision Making/ Complexity Score: low      GOALS:  SHORT TERM GOALS: 3 weeks     Recent signs and systems trend is improving in order to progress towards LTG's.     Patient will be independent with HEP in order to further progress and return to maximal function.     Patient will improve AROM to 50% of stated goals, listed in objective measures above, in order to progress towards independence with functional activities.      Pain rating at Worst: 5/10 in order to progress towards increased independence with activity.     Patient will be able to correct postural deviations in  sitting and standing, to decrease pain and promote postural awareness for injury prevention.         LONG TERM GOALS: 6 weeks     Patient will return to normal ADL, recreational, and work related activities with less pain and limitation.      Patient will improve AROM to stated goals in order to return to maximal functional potential.      Patient will improve Strength to stated goals of appropriate musculature in order to improve functional independence.      Pain Rating at Best: 1/10 to improve Quality of Life.               Patient will meet predicted functional outcome (FOTO) score: 5% to increase self-worth & perceived functional ability.                 PM=Partially Met     PC=Progressing/Continued     M=Met    PLAN     Plan of care Certification: 4/20/2023 to 6/1/2023.     Outpatient Physical Therapy 2 times weekly for 6 weeks to include any combination of the following interventions: virtual visits, dry needling, modalities, electrical stimulation (IFC, Pre-Mod, Attended with Functional Dry Needling), Cervical/Lumbar Traction, Electrical Stimulation , Manual Therapy, Moist Heat/ Ice, Neuromuscular Re-ed, Patient Education, Self Care, Therapeutic Activities, Therapeutic Exercise, Functional Training, and Therapeutic Activites        Wellington Carty, KARLENE   05/08/2023

## 2023-05-11 ENCOUNTER — CLINICAL SUPPORT (OUTPATIENT)
Dept: REHABILITATION | Facility: HOSPITAL | Age: 48
End: 2023-05-11
Payer: COMMERCIAL

## 2023-05-11 DIAGNOSIS — M54.16 LUMBAR RADICULOPATHY: Primary | ICD-10-CM

## 2023-05-11 PROCEDURE — 97530 THERAPEUTIC ACTIVITIES: CPT

## 2023-05-11 PROCEDURE — 97110 THERAPEUTIC EXERCISES: CPT

## 2023-05-11 NOTE — PROGRESS NOTES
[facility]   Physical Therapy Treatment Note     Name: Jonathan Rodriguez  Clinic Number: [id number]  Physician: Kulwant Ellis MD  Visit Date: 5/11/2023  Therapy Diagnosis: Lumbar radiculopathy     Physician Orders: PT Eval and Treat   Medical Diagnosis from Referral: Lumbar radiculopathy   Evaluation Date: 4/20/2023  Authorization Period Expiration: 3/26/2024  Plan of Care Expiration: 06/01/2023                          Visit # / Visits authorized: 4 / unlimited  FOTO:1/3     PTA Visit #: 2/5     Time In: 4:00 pm  Time Out: 4:55 pm   Total Billable Time: 55 minutes    SUBJECTIVE     Pt reports: her back pain is about a 3 today. She reports new onset of some pain and tingling in the neck.    She was compliant with home exercise program.  Response to previous treatment: soreness noted  Functional change: n/a    Pain: 2/10  Location: bilateral back and left leg    OBJECTIVE     Objective Measures updated at progress report unless specified.     Treatment     Jonathan received the treatments listed below:      therapeutic exercises to develop strength, endurance, ROM, flexibility, posture, and core stabilization for 40 minutes including:     TherEx 4/20/2023     Bike  x 5 minutes    Slantboard stretch  x 3 x 20 second hold    Hamstring stretch on step  x 3 x 20 second hold ea (bilateral)   Seated piriformis stretch  x 2 x 20 second hold bilateral    Cybex back extension  x 5 plates 3 x 10   Cybex bilateral leg press  x 7 plates 3 x 10   Cybex hamstring curls  x 4 plates 3 x 10   Posterior Pelvic Tilt       SKTC            Supine Brace March       Supine Sciatic Nerve Glide       neuromuscular re-education activities to improve: Coordination, Kinesthetic, Sense, and Posture for 0 minutes. The following activities were included:    DKTC with TrA bracing x 20 with 5 second hold yellow swiss ball  Hooklying LTR x 20 bilateral with 3 second hold   Hooklying Bridge with abduction  20 x 5 second hold  green theraband  Hooklying bridge with march 20 x 5 second hold green theraband   Longbridging over yellow swiss ball 10 x 5 second hold     therapeutic activities to improve functional performance for - 15 minutes, including:  - Trunk Rotation at cable machine 2 x 10 with 10 lbs  -Cybex Rows - Close 2 x 10 with 3 plates   - Cybex Rows - Wide 2 x 10 with 3 plates       Patient Education and Home Exercises     Home Exercises Provided and Patient Education Provided     Education provided:   - home exercise program review with addition of seated piriformis stretch    Written Home Exercises Provided: Patient instructed to cont prior HEP. Exercises were reviewed and Jonathan was able to demonstrate them prior to the end of the session.  Jonathan demonstrated good  understanding of the education provided. See EMR under Patient Instructions for exercises provided during therapy sessions    ASSESSMENT         Patient here today with back pain of about 3/10. She reports new onset of some pain and tingling in the neck. Therapist discussed proper spinal alignment, scapular retraction, and cervical mobility. She reports her back is feeling a little better with the exercises. We discussed her Home Exercise Program and she has good understanding of this. Encouraged her to continue to perform her exercises at home. Therapist added Cable Rotations and Cybex Rows today. She tolerated this well. We will continue to progress patient as able. Sup Visit performed today with MICHELLE Malin and MICHELLE Acevedo.  All goals and treatment plan reviewed. Will work toward completion of all goals set.         PMH from evaluation:   Jonathan is a 47 y.o. female referred to outpatient Physical Therapy with a medical diagnosis of Lumbar radiculopathy [M54.16]. Jonathan presents with clinical signs and symptoms that support this diagnosis with decreased Lumbar ROM, decreased lower extremity strength, Lumbar joint(s) hypomobility, lower extremity neural  tension, and impaired functional mobility. Radicular symptoms are present down into the Left Lower Extremity.The above impairments will be addressed through manual therapy techniques, therapeutic exercises, functional training, and modalities as necessary. Patient was treated and educated on exercises for home program, progression of therapy, and benefits of therapy to achieve full functional mobility. Patient will benefit from skilled physical therapy to meet short and long term goals and return to prior level of function.     Jonathan Is progressing towards her goals.   Pt prognosis is Good.     Pt will continue to benefit from skilled outpatient physical therapy to address the deficits listed in the problem list box on initial evaluation, provide pt/family education and to maximize pt's level of independence in the home and community environment.     Pt's spiritual, cultural and educational needs considered and pt agreeable to plan of care and goals.     Anticipated Barriers for therapy: co-morbidities, sedentary lifestyle, and chronicity of condition     Clinical Presentation stable and uncomplicated low   Decision Making/ Complexity Score: low      GOALS:  SHORT TERM GOALS: 3 weeks     Recent signs and systems trend is improving in order to progress towards LTG's.     Patient will be independent with HEP in order to further progress and return to maximal function.     Patient will improve AROM to 50% of stated goals, listed in objective measures above, in order to progress towards independence with functional activities.      Pain rating at Worst: 5/10 in order to progress towards increased independence with activity.     Patient will be able to correct postural deviations in sitting and standing, to decrease pain and promote postural awareness for injury prevention.         LONG TERM GOALS: 6 weeks     Patient will return to normal ADL, recreational, and work related activities with less pain and limitation.       Patient will improve AROM to stated goals in order to return to maximal functional potential.      Patient will improve Strength to stated goals of appropriate musculature in order to improve functional independence.      Pain Rating at Best: 1/10 to improve Quality of Life.               Patient will meet predicted functional outcome (FOTO) score: 5% to increase self-worth & perceived functional ability.                 PM=Partially Met     PC=Progressing/Continued     M=Met    PLAN     Plan of care Certification: 4/20/2023 to 6/1/2023.     Outpatient Physical Therapy 2 times weekly for 6 weeks to include any combination of the following interventions: virtual visits, dry needling, modalities, electrical stimulation (IFC, Pre-Mod, Attended with Functional Dry Needling), Cervical/Lumbar Traction, Electrical Stimulation , Manual Therapy, Moist Heat/ Ice, Neuromuscular Re-ed, Patient Education, Self Care, Therapeutic Activities, Therapeutic Exercise, Functional Training, and Therapeutic Activites        JAMIE ROBIN, PT   05/11/2023

## 2023-05-18 ENCOUNTER — CLINICAL SUPPORT (OUTPATIENT)
Dept: REHABILITATION | Facility: HOSPITAL | Age: 48
End: 2023-05-18
Payer: COMMERCIAL

## 2023-05-18 DIAGNOSIS — M54.16 LUMBAR RADICULOPATHY: Primary | ICD-10-CM

## 2023-05-18 PROCEDURE — 97530 THERAPEUTIC ACTIVITIES: CPT

## 2023-05-18 PROCEDURE — 97110 THERAPEUTIC EXERCISES: CPT

## 2023-05-18 NOTE — PROGRESS NOTES
[facility]   Physical Therapy Treatment Note     Name: Jonathan Rodriguez  Clinic Number: [id number]  Physician: Kulwant Ellis MD  Visit Date: 5/18/2023  Therapy Diagnosis: Lumbar radiculopathy     Physician Orders: PT Eval and Treat   Medical Diagnosis from Referral: Lumbar radiculopathy   Evaluation Date: 4/20/2023  Authorization Period Expiration: 3/26/2024  Plan of Care Expiration: 06/01/2023                          Visit # / Visits authorized: 5 / unlimited  FOTO:1/3     PTA Visit #: 2/5     Time In: 4:04 pm  Time Out: 5:02 pm   Total Billable Time: 55 minutes    SUBJECTIVE     Pt reports: her Left side is feeling better, but the Right side started hurting    She was compliant with home exercise program.  Response to previous treatment: soreness noted  Functional change: n/a    Pain: 4-7/10  Location: bilateral back and left leg    OBJECTIVE     Objective Measures updated at progress report unless specified.     Treatment     Jonathan received the treatments listed below:      therapeutic exercises to develop strength, endurance, ROM, flexibility, posture, and core stabilization for 40 minutes including:     TherEx 4/20/2023     Bike  x 5 minutes    Slantboard stretch  x 3 x 20 second hold    Hamstring stretch on step  x 3 x 20 second hold ea (bilateral)   Seated piriformis stretch  x 2 x 20 second hold bilateral    Cybex back extension  x 5 plates 3 x 10   Cybex bilateral leg press  x 7 plates 3 x 10   Cybex hamstring curls  x 4 plates 3 x 10   Posterior Pelvic Tilt       SKTC            Supine Brace March       Supine Sciatic Nerve Glide       neuromuscular re-education activities to improve: Coordination, Kinesthetic, Sense, and Posture for 0 minutes. The following activities were included:    DKTC with TrA bracing x 20 with 5 second hold yellow swiss ball  Hooklying LTR x 20 bilateral with 3 second hold   Hooklying Bridge with abduction  20 x 5 second hold green theraband  Hooklying  bridge with march 20 x 5 second hold green theraband   Longbridging over yellow swiss ball 10 x 5 second hold     therapeutic activities to improve functional performance for - 15 minutes, including:  - Trunk Rotation at cable machine 2 x 10 with 10 lbs  -Cybex Rows - Close 2 x 10 with 3 plates   - Cybex Rows - Wide 2 x 10 with 3 plates       Patient Education and Home Exercises     Home Exercises Provided and Patient Education Provided     Education provided:   - home exercise program review with addition of seated piriformis stretch    Written Home Exercises Provided: Patient instructed to cont prior HEP. Exercises were reviewed and Jonathan was able to demonstrate them prior to the end of the session.  Jonathan demonstrated good  understanding of the education provided. See EMR under Patient Instructions for exercises provided during therapy sessions    ASSESSMENT     Patient reported she liked the Cybex machines we did at the last visit. She reports mild muscle soreness but not increased back pain. She stated that the Left side of the Lumbar spine is actually feeling better, but she is having new onset Right sided lumbar pain that started this morning. She is unsure what has caused this new pain. She was able to perform all exercises as listed above. She reported her right sided pain dropped from 7/10 to 5/10 during Therapy. She is progressing very well with Therapy. We will continue to progress as able. Sup Visit performed today with MICHELLE Malin and MICHELLE Acevedo.  All goals and treatment plan reviewed. Will work toward completion of all goals set.           PMH from evaluation:   Jonathan is a 47 y.o. female referred to outpatient Physical Therapy with a medical diagnosis of Lumbar radiculopathy [M54.16]. Jonathan presents with clinical signs and symptoms that support this diagnosis with decreased Lumbar ROM, decreased lower extremity strength, Lumbar joint(s) hypomobility, lower extremity neural tension, and  impaired functional mobility. Radicular symptoms are present down into the Left Lower Extremity.The above impairments will be addressed through manual therapy techniques, therapeutic exercises, functional training, and modalities as necessary. Patient was treated and educated on exercises for home program, progression of therapy, and benefits of therapy to achieve full functional mobility. Patient will benefit from skilled physical therapy to meet short and long term goals and return to prior level of function.     Jonathan Is progressing towards her goals.   Pt prognosis is Good.     Pt will continue to benefit from skilled outpatient physical therapy to address the deficits listed in the problem list box on initial evaluation, provide pt/family education and to maximize pt's level of independence in the home and community environment.     Pt's spiritual, cultural and educational needs considered and pt agreeable to plan of care and goals.     Anticipated Barriers for therapy: co-morbidities, sedentary lifestyle, and chronicity of condition     Clinical Presentation stable and uncomplicated low   Decision Making/ Complexity Score: low      GOALS:  SHORT TERM GOALS: 3 weeks     Recent signs and systems trend is improving in order to progress towards LTG's.     Patient will be independent with HEP in order to further progress and return to maximal function.     Patient will improve AROM to 50% of stated goals, listed in objective measures above, in order to progress towards independence with functional activities.      Pain rating at Worst: 5/10 in order to progress towards increased independence with activity.     Patient will be able to correct postural deviations in sitting and standing, to decrease pain and promote postural awareness for injury prevention.         LONG TERM GOALS: 6 weeks     Patient will return to normal ADL, recreational, and work related activities with less pain and limitation.      Patient will  improve AROM to stated goals in order to return to maximal functional potential.      Patient will improve Strength to stated goals of appropriate musculature in order to improve functional independence.      Pain Rating at Best: 1/10 to improve Quality of Life.               Patient will meet predicted functional outcome (FOTO) score: 5% to increase self-worth & perceived functional ability.                 PM=Partially Met     PC=Progressing/Continued     M=Met    PLAN     Plan of care Certification: 4/20/2023 to 6/1/2023.     Outpatient Physical Therapy 2 times weekly for 6 weeks to include any combination of the following interventions: virtual visits, dry needling, modalities, electrical stimulation (IFC, Pre-Mod, Attended with Functional Dry Needling), Cervical/Lumbar Traction, Electrical Stimulation , Manual Therapy, Moist Heat/ Ice, Neuromuscular Re-ed, Patient Education, Self Care, Therapeutic Activities, Therapeutic Exercise, Functional Training, and Therapeutic Activites        JAMIE ROBIN, PT, DPT   05/18/2023

## 2023-05-25 ENCOUNTER — CLINICAL SUPPORT (OUTPATIENT)
Dept: REHABILITATION | Facility: HOSPITAL | Age: 48
End: 2023-05-25
Payer: COMMERCIAL

## 2023-05-25 DIAGNOSIS — M54.16 LUMBAR RADICULOPATHY: Primary | ICD-10-CM

## 2023-05-25 PROCEDURE — 97110 THERAPEUTIC EXERCISES: CPT | Mod: CQ

## 2023-05-25 PROCEDURE — 97530 THERAPEUTIC ACTIVITIES: CPT | Mod: CQ

## 2023-05-25 NOTE — PROGRESS NOTES
[facility]   Physical Therapy Treatment Note     Name: Jonathan Rodriguez  Clinic Number: [id number]  Physician: Kulwant Ellis MD  Visit Date: 5/25/2023  Therapy Diagnosis: Lumbar radiculopathy     Physician Orders: PT Eval and Treat   Medical Diagnosis from Referral: Lumbar radiculopathy   Evaluation Date: 4/20/2023  Authorization Period Expiration: 3/26/2024  Plan of Care Expiration: 06/01/2023                          Visit # / Visits authorized: 6 / unlimited  FOTO:1/3     PTA Visit #: 1/5     Time In: 4:08 pm  Time Out: 4:43 pm   Total Billable Time: 35 minutes    SUBJECTIVE     Pt reports: her back is ok today because she has not really done a lot of lifting this week to flare it up.     She was compliant with home exercise program.  Response to previous treatment: soreness noted  Functional change: n/a    Pain: 0/10  Location: bilateral back and left leg    OBJECTIVE     Objective Measures updated at progress report unless specified.     Treatment     Jonathan received the treatments listed below:      therapeutic exercises to develop strength, endurance, ROM, flexibility, posture, and core stabilization for 25 minutes including:     TherEx 4/20/2023     Bike  x 5 minutes    Slantboard stretch  x 3 x 20 second hold    Hamstring stretch on step  x 3 x 20 second hold ea (bilateral)   Seated piriformis stretch  x 2 x 20 second hold bilateral    Cybex back extension  x 5 plates 3 x 10   Cybex bilateral leg press  x 8 plates 2 x 10   Cybex hamstring curls  x 5.5 plates 2 x 10   Posterior Pelvic Tilt       SKTC            Supine Brace March       Supine Sciatic Nerve Glide       neuromuscular re-education activities to improve: Coordination, Kinesthetic, Sense, and Posture for 0 minutes. The following activities were included:    DKTC with TrA bracing x 20 with 5 second hold yellow swiss ball  Hooklying LTR x 20 bilateral with 3 second hold   Hooklying Bridge with abduction  20 x 5 second  hold green theraband  Hooklying bridge with march 20 x 5 second hold green theraband   Longbridging over yellow swiss ball 10 x 5 second hold     therapeutic activities to improve functional performance for - 10 minutes, including:  - Trunk Rotation at cable machine 3 x 10 with 10 lbs bilateral   -Cybex Rows - Close 2 x 10 with 4 plates   - Cybex Rows - Wide 2 x 10 with 4 plates       Patient Education and Home Exercises     Home Exercises Provided and Patient Education Provided     Education provided:   - home exercise program review with addition of seated piriformis stretch    Written Home Exercises Provided: Patient instructed to cont prior HEP. Exercises were reviewed and Jonathan was able to demonstrate them prior to the end of the session.  Jonathan demonstrated good  understanding of the education provided. See EMR under Patient Instructions for exercises provided during therapy sessions    ASSESSMENT     Patient reported she is doing ok today with reports of no increased pain from not lifting anything this week. She is progressing well a this time with resistive exercises noting Increased weight on cybex bilateral hamstring curls and cybex rows as well as Increased repetitions on cable trunk rotation today. She is progressing very well with Therapy and is interested in joining a gym once she completes therapy. We will continue to progress as able when she returns.       PMH from evaluation:   Jonathan is a 47 y.o. female referred to outpatient Physical Therapy with a medical diagnosis of Lumbar radiculopathy [M54.16]. Jonathan presents with clinical signs and symptoms that support this diagnosis with decreased Lumbar ROM, decreased lower extremity strength, Lumbar joint(s) hypomobility, lower extremity neural tension, and impaired functional mobility. Radicular symptoms are present down into the Left Lower Extremity.The above impairments will be addressed through manual therapy techniques, therapeutic exercises, functional  training, and modalities as necessary. Patient was treated and educated on exercises for home program, progression of therapy, and benefits of therapy to achieve full functional mobility. Patient will benefit from skilled physical therapy to meet short and long term goals and return to prior level of function.     Jonathan Is progressing towards her goals.   Pt prognosis is Good.     Pt will continue to benefit from skilled outpatient physical therapy to address the deficits listed in the problem list box on initial evaluation, provide pt/family education and to maximize pt's level of independence in the home and community environment.     Pt's spiritual, cultural and educational needs considered and pt agreeable to plan of care and goals.     Anticipated Barriers for therapy: co-morbidities, sedentary lifestyle, and chronicity of condition     Clinical Presentation stable and uncomplicated low   Decision Making/ Complexity Score: low      GOALS:  SHORT TERM GOALS: 3 weeks     Recent signs and systems trend is improving in order to progress towards LTG's.     Patient will be independent with HEP in order to further progress and return to maximal function.     Patient will improve AROM to 50% of stated goals, listed in objective measures above, in order to progress towards independence with functional activities.      Pain rating at Worst: 5/10 in order to progress towards increased independence with activity.     Patient will be able to correct postural deviations in sitting and standing, to decrease pain and promote postural awareness for injury prevention.         LONG TERM GOALS: 6 weeks     Patient will return to normal ADL, recreational, and work related activities with less pain and limitation.      Patient will improve AROM to stated goals in order to return to maximal functional potential.      Patient will improve Strength to stated goals of appropriate musculature in order to improve functional independence.       Pain Rating at Best: 1/10 to improve Quality of Life.               Patient will meet predicted functional outcome (FOTO) score: 5% to increase self-worth & perceived functional ability.                 PM=Partially Met     PC=Progressing/Continued     M=Met    PLAN     Plan of care Certification: 4/20/2023 to 6/1/2023.     Outpatient Physical Therapy 2 times weekly for 6 weeks to include any combination of the following interventions: virtual visits, dry needling, modalities, electrical stimulation (IFC, Pre-Mod, Attended with Functional Dry Needling), Cervical/Lumbar Traction, Electrical Stimulation , Manual Therapy, Moist Heat/ Ice, Neuromuscular Re-ed, Patient Education, Self Care, Therapeutic Activities, Therapeutic Exercise, Functional Training, and Therapeutic Activites        Wellington Carty, PTA  05/25/2023

## 2023-05-30 ENCOUNTER — CLINICAL SUPPORT (OUTPATIENT)
Dept: REHABILITATION | Facility: HOSPITAL | Age: 48
End: 2023-05-30
Payer: COMMERCIAL

## 2023-05-30 DIAGNOSIS — M54.16 LUMBAR RADICULOPATHY: Primary | ICD-10-CM

## 2023-05-30 PROCEDURE — 97112 NEUROMUSCULAR REEDUCATION: CPT

## 2023-05-30 PROCEDURE — 97530 THERAPEUTIC ACTIVITIES: CPT

## 2023-05-30 PROCEDURE — 97110 THERAPEUTIC EXERCISES: CPT

## 2023-05-30 NOTE — PLAN OF CARE
[facility]   Physical Therapy Discharge Summary     Name: Jonathan Rodriguez  Clinic Number: [id number]  Physician: Kulwant Ellis MD  Visit Date: 5/30/2023  Therapy Diagnosis: Lumbar radiculopathy     Physician Orders: PT Eval and Treat   Medical Diagnosis from Referral: Lumbar radiculopathy   Evaluation Date: 4/20/2023  Authorization Period Expiration: 3/26/2024  Plan of Care Expiration: 06/01/2023                          Visit # / Visits authorized: 7 / unlimited  FOTO:1/3     PTA Visit #: 1/5     Time In: 4:00 pm  Time Out: 4:55 pm   Total Billable Time: 55 minutes    SUBJECTIVE     Pt reports: her back is a little better but it is still painful depending on what she is doing.    She was compliant with home exercise program.  Response to previous treatment: soreness noted  Functional change: n/a    Pain: 0/10  Location: bilateral back and left leg    OBJECTIVE     Objective Measures updated at progress report unless specified.     Treatment     Jonathan received the treatments listed below:      therapeutic exercises to develop strength, endurance, ROM, flexibility, posture, and core stabilization for 25 minutes including:     TherEx 4/20/2023     Bike  x 5 minutes    Slantboard stretch  x 3 x 20 second hold    Hamstring stretch on step  x 3 x 20 second hold ea (bilateral)   Seated piriformis stretch  x 2 x 20 second hold bilateral    Cybex back extension  x 5 plates 3 x 10   Cybex bilateral leg press  x 8 plates 2 x 10   Cybex hamstring curls  x 5.5 plates 2 x 10   Posterior Pelvic Tilt       SKTC            Supine Brace March       Supine Sciatic Nerve Glide       neuromuscular re-education activities to improve: Coordination, Kinesthetic, Sense, and Posture for 20 minutes. The following activities were included:    DKTC with TrA bracing x 20 with 5 second hold yellow swiss ball  Hooklying LTR x 20 bilateral with 3 second hold   Hooklying Bridge with abduction  20 x 5 second hold green  theraband  Hooklying bridge with march 20 x 5 second hold green theraband   Longbridging over yellow swiss ball 10 x 5 second hold     therapeutic activities to improve functional performance for - 10 minutes, including:  - Trunk Rotation at cable machine 3 x 10 with 10 lbs bilateral   -Cybex Rows - Close 2 x 10 with 4 plates   - Cybex Rows - Wide 2 x 10 with 4 plates       Patient Education and Home Exercises     Home Exercises Provided and Patient Education Provided     Education provided:   - home exercise program review with addition of seated piriformis stretch    Written Home Exercises Provided: Patient instructed to cont prior HEP. Exercises were reviewed and Jonathan was able to demonstrate them prior to the end of the session.  Jonathan demonstrated good  understanding of the education provided. See EMR under Patient Instructions for exercises provided during therapy sessions    ASSESSMENT     Patient has received Physical Therapy for 6 weeks and 7 visits. She worked hard in Therapy but did continue to have back pain. She does present with increased Range of Motion and better posture. Patient has able to meet most goals set at time of Evaluation. We will discharge patient from Physical Therapy services today with Home Exercise Plan in place.          Anticipated Barriers for therapy: co-morbidities, sedentary lifestyle, and chronicity of condition     Clinical Presentation stable and uncomplicated low   Decision Making/ Complexity Score: low      GOALS:  SHORT TERM GOALS: 3 weeks     Recent signs and systems trend is improving in order to progress towards LTG's. Met    Patient will be independent with HEP in order to further progress and return to maximal function.  Met   Patient will improve AROM to 50% of stated goals, listed in objective measures above, in order to progress towards independence with functional activities.  Met   Pain rating at Worst: 5/10 in order to progress towards increased independence with  activity.  Met   Patient will be able to correct postural deviations in sitting and standing, to decrease pain and promote postural awareness for injury prevention.   Met      LONG TERM GOALS: 6 weeks     Patient will return to normal ADL, recreational, and work related activities with less pain and limitation.  Met    Patient will improve AROM to stated goals in order to return to maximal functional potential.   Met   Patient will improve Strength to stated goals of appropriate musculature in order to improve functional independence.  Met    Pain Rating at Best: 1/10 to improve Quality of Life.   Not Met    Patient will meet predicted functional outcome (FOTO) score: 5% to increase self-worth & perceived functional ability. Not Met           Discharge reason: Patient has reached the maximum rehab potential for the present time    Plan   This patient is discharged from Physical Therapy.    Date of Last visit: 5/30/2023  Total Visits Received: 7  Cancelled Visits: 0  No Show Visits: 0    JAMIE H SHARDA PT , DPT

## 2023-05-30 NOTE — PROGRESS NOTES
[facility]   Physical Therapy Treatment Note     Name: Jonathan Rodriguez  Clinic Number: [id number]  Physician: Kulwant Ellis MD  Visit Date: 5/30/2023  Therapy Diagnosis: Lumbar radiculopathy     Physician Orders: PT Eval and Treat   Medical Diagnosis from Referral: Lumbar radiculopathy   Evaluation Date: 4/20/2023  Authorization Period Expiration: 3/26/2024  Plan of Care Expiration: 06/01/2023                          Visit # / Visits authorized: 7 / unlimited  FOTO:1/3     PTA Visit #: 1/5     Time In: 4:00 pm  Time Out: 4:55 pm   Total Billable Time: 55 minutes    SUBJECTIVE     Pt reports: her back is a little better but it is still painful depending on what she is doing.    She was compliant with home exercise program.  Response to previous treatment: soreness noted  Functional change: n/a    Pain: 0/10  Location: bilateral back and left leg    OBJECTIVE     Objective Measures updated at progress report unless specified.     Treatment     Jonathan received the treatments listed below:      therapeutic exercises to develop strength, endurance, ROM, flexibility, posture, and core stabilization for 25 minutes including:     TherEx 4/20/2023     Bike  x 5 minutes    Slantboard stretch  x 3 x 20 second hold    Hamstring stretch on step  x 3 x 20 second hold ea (bilateral)   Seated piriformis stretch  x 2 x 20 second hold bilateral    Cybex back extension  x 5 plates 3 x 10   Cybex bilateral leg press  x 8 plates 2 x 10   Cybex hamstring curls  x 5.5 plates 2 x 10   Posterior Pelvic Tilt       SKTC            Supine Brace March       Supine Sciatic Nerve Glide       neuromuscular re-education activities to improve: Coordination, Kinesthetic, Sense, and Posture for 20 minutes. The following activities were included:    DKTC with TrA bracing x 20 with 5 second hold yellow swiss ball  Hooklying LTR x 20 bilateral with 3 second hold   Hooklying Bridge with abduction  20 x 5 second hold green  theraband  Hooklying bridge with march 20 x 5 second hold green theraband   Longbridging over yellow swiss ball 10 x 5 second hold     therapeutic activities to improve functional performance for - 10 minutes, including:  - Trunk Rotation at cable machine 3 x 10 with 10 lbs bilateral   -Cybex Rows - Close 2 x 10 with 4 plates   - Cybex Rows - Wide 2 x 10 with 4 plates       Patient Education and Home Exercises     Home Exercises Provided and Patient Education Provided     Education provided:   - home exercise program review with addition of seated piriformis stretch    Written Home Exercises Provided: Patient instructed to cont prior HEP. Exercises were reviewed and Jonathan was able to demonstrate them prior to the end of the session.  Jonathan demonstrated good  understanding of the education provided. See EMR under Patient Instructions for exercises provided during therapy sessions    ASSESSMENT     Patient has received Physical Therapy for 6 weeks and 7 visits. She worked hard in Therapy but did continue to have back pain. She does present with increased Range of Motion and better posture. Patient has able to meet most goals set at time of Evaluation. We will discharge patient from Physical Therapy services today with Home Exercise Plan in place.          Anticipated Barriers for therapy: co-morbidities, sedentary lifestyle, and chronicity of condition     Clinical Presentation stable and uncomplicated low   Decision Making/ Complexity Score: low      GOALS:  SHORT TERM GOALS: 3 weeks     Recent signs and systems trend is improving in order to progress towards LTG's. Met    Patient will be independent with HEP in order to further progress and return to maximal function.  Met   Patient will improve AROM to 50% of stated goals, listed in objective measures above, in order to progress towards independence with functional activities.  Met   Pain rating at Worst: 5/10 in order to progress towards increased independence with  activity.  Met   Patient will be able to correct postural deviations in sitting and standing, to decrease pain and promote postural awareness for injury prevention.   Met      LONG TERM GOALS: 6 weeks     Patient will return to normal ADL, recreational, and work related activities with less pain and limitation.  Met    Patient will improve AROM to stated goals in order to return to maximal functional potential.   Met   Patient will improve Strength to stated goals of appropriate musculature in order to improve functional independence.  Met    Pain Rating at Best: 1/10 to improve Quality of Life.   Not Met    Patient will meet predicted functional outcome (FOTO) score: 5% to increase self-worth & perceived functional ability. Not Met           PM=Partially Met     PC=Progressing/Continued     M=Met    PLAN     Patient to be Discharged from Physical Therapy services following today's treatment. See Discharge Summary if needed.      JAMIE ROBIN, PT, DPT  05/30/2023

## 2023-06-21 ENCOUNTER — ANESTHESIA EVENT (OUTPATIENT)
Dept: GASTROENTEROLOGY | Facility: HOSPITAL | Age: 48
End: 2023-06-21
Payer: COMMERCIAL

## 2023-06-21 ENCOUNTER — ANESTHESIA (OUTPATIENT)
Dept: GASTROENTEROLOGY | Facility: HOSPITAL | Age: 48
End: 2023-06-21
Payer: COMMERCIAL

## 2023-06-21 ENCOUNTER — HOSPITAL ENCOUNTER (OUTPATIENT)
Dept: GASTROENTEROLOGY | Facility: HOSPITAL | Age: 48
Discharge: HOME | End: 2023-06-21
Payer: COMMERCIAL

## 2023-06-21 VITALS
HEART RATE: 69 BPM | DIASTOLIC BLOOD PRESSURE: 99 MMHG | SYSTOLIC BLOOD PRESSURE: 161 MMHG | TEMPERATURE: 97 F | RESPIRATION RATE: 23 BRPM | OXYGEN SATURATION: 100 %

## 2023-06-21 DIAGNOSIS — R13.10 DYSPHAGIA, UNSPECIFIED TYPE: ICD-10-CM

## 2023-06-21 DIAGNOSIS — K52.9 CHRONIC DIARRHEA: Primary | ICD-10-CM

## 2023-06-21 DIAGNOSIS — R13.14 PHARYNGOESOPHAGEAL DYSPHAGIA: ICD-10-CM

## 2023-06-21 PROCEDURE — 37000008 HC ANESTHESIA 1ST 15 MINUTES

## 2023-06-21 PROCEDURE — 88305 TISSUE EXAM BY PATHOLOGIST: CPT | Mod: TC,SUR | Performed by: INTERNAL MEDICINE

## 2023-06-21 PROCEDURE — D9220A PRA ANESTHESIA: ICD-10-PCS | Mod: ,,,

## 2023-06-21 PROCEDURE — 27000284 HC CANNULA NASAL

## 2023-06-21 PROCEDURE — 43239 EGD BIOPSY SINGLE/MULTIPLE: CPT | Mod: 59 | Performed by: INTERNAL MEDICINE

## 2023-06-21 PROCEDURE — 88305 SURGICAL PATHOLOGY: ICD-10-PCS | Mod: 26,,, | Performed by: PATHOLOGY

## 2023-06-21 PROCEDURE — 43248 PR EGD, FLEX, W/DILATION OVER GUIDEWIRE: ICD-10-PCS | Mod: ,,, | Performed by: INTERNAL MEDICINE

## 2023-06-21 PROCEDURE — 88342 SURGICAL PATHOLOGY: ICD-10-PCS | Mod: 26,,, | Performed by: PATHOLOGY

## 2023-06-21 PROCEDURE — 43239 PR EGD, FLEX, W/BIOPSY, SGL/MULTI: ICD-10-PCS | Mod: 59,,, | Performed by: INTERNAL MEDICINE

## 2023-06-21 PROCEDURE — 88305 TISSUE EXAM BY PATHOLOGIST: CPT | Mod: 26,,, | Performed by: PATHOLOGY

## 2023-06-21 PROCEDURE — 43248 EGD GUIDE WIRE INSERTION: CPT | Performed by: INTERNAL MEDICINE

## 2023-06-21 PROCEDURE — 27201423 OPTIME MED/SURG SUP & DEVICES STERILE SUPPLY

## 2023-06-21 PROCEDURE — 43248 EGD GUIDE WIRE INSERTION: CPT | Mod: ,,, | Performed by: INTERNAL MEDICINE

## 2023-06-21 PROCEDURE — 43239 EGD BIOPSY SINGLE/MULTIPLE: CPT | Mod: 59,,, | Performed by: INTERNAL MEDICINE

## 2023-06-21 PROCEDURE — 27000716 HC OXISENSOR PROBE, ANY SIZE

## 2023-06-21 PROCEDURE — 88342 IMHCHEM/IMCYTCHM 1ST ANTB: CPT | Mod: 26,,, | Performed by: PATHOLOGY

## 2023-06-21 PROCEDURE — 37000009 HC ANESTHESIA EA ADD 15 MINS

## 2023-06-21 PROCEDURE — D9220A PRA ANESTHESIA: Mod: ,,,

## 2023-06-21 PROCEDURE — 63600175 PHARM REV CODE 636 W HCPCS

## 2023-06-21 PROCEDURE — 25000003 PHARM REV CODE 250

## 2023-06-21 RX ORDER — SODIUM CHLORIDE 0.9 % (FLUSH) 0.9 %
10 SYRINGE (ML) INJECTION
Status: CANCELLED | OUTPATIENT
Start: 2023-06-21

## 2023-06-21 RX ORDER — CHOLESTYRAMINE 4 G/9G
4 POWDER, FOR SUSPENSION ORAL 2 TIMES DAILY
Qty: 60 PACKET | Refills: 11 | Status: SHIPPED | OUTPATIENT
Start: 2023-06-21 | End: 2023-11-15

## 2023-06-21 RX ORDER — LIDOCAINE HYDROCHLORIDE 20 MG/ML
INJECTION INTRAVENOUS
Status: DISCONTINUED | OUTPATIENT
Start: 2023-06-21 | End: 2023-06-21

## 2023-06-21 RX ORDER — FENTANYL CITRATE 50 UG/ML
INJECTION, SOLUTION INTRAMUSCULAR; INTRAVENOUS
Status: DISCONTINUED | OUTPATIENT
Start: 2023-06-21 | End: 2023-06-21

## 2023-06-21 RX ORDER — PROPOFOL 10 MG/ML
VIAL (ML) INTRAVENOUS
Status: DISCONTINUED | OUTPATIENT
Start: 2023-06-21 | End: 2023-06-21

## 2023-06-21 RX ADMIN — PROPOFOL 50 MG: 10 INJECTION, EMULSION INTRAVENOUS at 01:06

## 2023-06-21 RX ADMIN — LIDOCAINE HYDROCHLORIDE 100 MG: 20 INJECTION, SOLUTION INTRAVENOUS at 01:06

## 2023-06-21 RX ADMIN — SODIUM CHLORIDE: 9 INJECTION, SOLUTION INTRAVENOUS at 01:06

## 2023-06-21 RX ADMIN — FENTANYL CITRATE 100 MCG: 50 INJECTION INTRAMUSCULAR; INTRAVENOUS at 01:06

## 2023-06-21 NOTE — DISCHARGE INSTRUCTIONS
Impression  Overall Impression:   - Grade A reflux esophagitis   - Mild gastritis, biopsied   - The exam was otherwise normal   - random biopsies taken in the esophagus and duodenum (dysphagia, chronic diarrhea)  - No stricture, hiatal hernia, peptic ulcer, bezoar, GOO, or mass  - Empiric Savary dilation to 18-mm with no complications      Recommendation    Await pathology results  Use pepcid 20 mg up to 3 times daily as needed for heartburn symptoms   Start a trial of cholestyramine for diarrhea - Rx sent to pharmacy   If symptoms improve with dilation, can repeat in the future as needed      Outcome of procedure: successful EGD  Disposition: patient to recovery following procedure; discharge to home when appropriate parameters met  Provisions for follow up: please call my office for any unexpected symptoms like chest or abdominal pain or bleeding following your procedure.  Final Diagnosis: dysphagia     Drink fluids, no operating heavy machinery, driving, or signing legal documents until tomorrow.

## 2023-06-21 NOTE — ANESTHESIA POSTPROCEDURE EVALUATION
Anesthesia Post Evaluation    Patient: Jonathan Need    Procedure(s) Performed: * EGD w/dilation     Final Anesthesia Type: general      Patient location during evaluation: GI PACU  Patient participation: Yes- Able to Participate  Level of consciousness: awake and alert  Post-procedure vital signs: reviewed and stable  Pain management: adequate  Airway patency: patent    PONV status at discharge: No PONV  Anesthetic complications: no      Cardiovascular status: blood pressure returned to baseline and hemodynamically stable  Respiratory status: spontaneous ventilation  Hydration status: euvolemic  Follow-up not needed.  Comments: Pt voices appreciation for care          Vitals Value Taken Time   /90 06/21/23 1408   Temp 36.1 °C (97 °F) 06/21/23 1345   Pulse 81 06/21/23 1410   Resp 19 06/21/23 1410   SpO2 97 % 06/21/23 1410   Vitals shown include unvalidated device data.      Event Time   Out of Recovery 14:15:10         Pain/Elinor Score: Elinor Score: 10 (6/21/2023  1:48 PM)

## 2023-06-21 NOTE — TRANSFER OF CARE
Anesthesia Transfer of Care Note    Patient: Jonathan Need    Procedure(s) Performed: * EGD w/dilation     Patient location: GI    Anesthesia Type: general    Transport from OR: Transported from OR on room air with adequate spontaneous ventilation. Continuous ECG monitoring in transport. Continuous SpO2 monitoring in transport    Post pain: adequate analgesia    Post assessment: no apparent anesthetic complications    Post vital signs: stable    Level of consciousness: sedated and responds to stimulation    Nausea/Vomiting: no nausea/vomiting    Complications: none    Transfer of care protocol was followedComments: Good SV continue, NAD, VSS, RTRN      Last vitals:   Visit Vitals  BP (!) 154/93 (BP Location: Left arm, Patient Position: Lying)   Pulse 81   Temp 36.1 °C (97 °F) (Skin)   Resp 18   SpO2 95%   Breastfeeding No

## 2023-06-21 NOTE — ANESTHESIA PREPROCEDURE EVALUATION
06/21/2023  Jonathan Rodriguez is a 47 y.o., female.  Current Outpatient Medications on File Prior to Encounter   Medication Sig Dispense Refill    ALPRAZolam (XANAX) 0.5 MG tablet Take 0.5 mg by mouth daily as needed.      metoprolol tartrate (LOPRESSOR) 50 MG tablet Take 1 tablet by mouth 2 (two) times a day.      naproxen sodium (ANAPROX) 220 MG tablet Take 220 mg by mouth daily as needed.      pantoprazole (PROTONIX) 40 MG tablet Take 1 tablet (40 mg total) by mouth once daily. 30 tablet 2    pregabalin (LYRICA) 75 MG capsule Take 1 capsule (75 mg total) by mouth 2 (two) times daily. 60 capsule 5    sertraline (ZOLOFT) 50 MG tablet Take 50 mg by mouth every morning.      triamterene-hydrochlorothiazide 37.5-25 mg (MAXZIDE-25) 37.5-25 mg per tablet Take 1 tablet by mouth Daily.       No current facility-administered medications on file prior to encounter.     Active Ambulatory Problems     Diagnosis Date Noted    Cholelithiasis with chronic cholecystitis without biliary obstruction 02/20/2023    Chronic constipation 02/20/2023    Radicular low back pain 02/20/2023     Resolved Ambulatory Problems     Diagnosis Date Noted    No Resolved Ambulatory Problems     Past Medical History:   Diagnosis Date    Anxiety disorder, unspecified     GERD (gastroesophageal reflux disease)     Hypertension     Lumbar pain     Pseudoseizures     Unspecified osteoarthritis, unspecified site      Past Surgical History:   Procedure Laterality Date    bladder tack      HYSTERECTOMY      LAPAROSCOPIC CHOLECYSTECTOMY N/A 3/10/2023    Procedure: CHOLECYSTECTOMY, LAPAROSCOPIC;  Surgeon: Josue Ramirez MD;  Location: [facility], [place];  Service: General;  Laterality: N/A;         Pre-op Assessment    I have reviewed the Patient Summary Reports.     I have reviewed the Nursing Notes. I have reviewed the NPO Status.   I have  reviewed the Medications.     Review of Systems  Anesthesia Hx:  No problems with previous Anesthesia  Denies Family Hx of Anesthesia complications.   Denies Personal Hx of Anesthesia complications.   Social:  Non-Smoker, No Alcohol Use    Hematology/Oncology:  Hematology Normal   Oncology Normal     EENT/Dental:EENT/Dental Normal   Cardiovascular:   Exercise tolerance: good Hypertension    Pulmonary:   Sleep Apnea, CPAP    Education provided regarding risk of obstructive sleep apnea     Renal/:  Renal/ Normal     Hepatic/GI:   GERD, well controlled Dysphagia    Musculoskeletal:   Arthritis     Neurological:   Seizures (Pseudoseizures)    Endocrine:  Endocrine Normal  Obesity / BMI > 30  Dermatological:  Skin Normal    Psych:   Psychiatric History anxiety depression          Physical Exam  General: Well nourished, Cooperative, Alert and Oriented    Airway:  Mallampati: II   Mouth Opening: Normal  TM Distance: Normal  Tongue: Normal  Neck ROM: Normal ROM    Dental:  Intact    Chest/Lungs:  Normal Respiratory Rate        Anesthesia Plan  Type of Anesthesia, risks & benefits discussed:    Anesthesia Type: Gen Natural Airway  Intra-op Monitoring Plan: Standard ASA Monitors  Post Op Pain Control Plan: multimodal analgesia  Induction:  IV  Informed Consent: Informed consent signed with the Patient and all parties understand the risks and agree with anesthesia plan.  All questions answered. Patient consented to blood products? Yes  ASA Score: 3  Day of Surgery Review of History & Physical: H&P Update referred to the surgeon/provider.I have interviewed and examined the patient. I have reviewed the patient's H&P dated: There are no significant changes.     Ready For Surgery From Anesthesia Perspective.     .

## 2023-06-21 NOTE — H&P
Gastroenterology Pre-procedure H&P    Chief Complaint: Pharyngoesophageal dysphagia    History of Present Illness    Jonathan Rodriguez is a 47 y.o. female that  has a past medical history of Anxiety disorder, unspecified, GERD (gastroesophageal reflux disease), Hypertension, Lumbar pain, Pseudoseizures, and Unspecified osteoarthritis, unspecified site.     Patient with dysphagia (points to suprasternal notch) to solids progressive over several months. Also with intermittent reflux, diarrhea (since cholecystectomy).       Past Medical History:   Diagnosis Date    Anxiety disorder, unspecified     GERD (gastroesophageal reflux disease)     Hypertension     Lumbar pain     Pseudoseizures     Unspecified osteoarthritis, unspecified site        Past Surgical History:   Procedure Laterality Date    bladder tack      HYSTERECTOMY      LAPAROSCOPIC CHOLECYSTECTOMY N/A 3/10/2023    Procedure: CHOLECYSTECTOMY, LAPAROSCOPIC;  Surgeon: Josue Ramirez MD;  Location: [facility];  Service: General;  Laterality: N/A;       Family History   Problem Relation Age of Onset    Hypertension Mother     Diabetes Mother     Diabetes Father     Hypertension Sister     Diabetes Maternal Aunt     Cancer Paternal Aunt     Cancer Paternal Uncle     Heart disease Maternal Grandmother     Diabetes Maternal Grandfather     Heart disease Maternal Grandfather     Heart disease Paternal Grandmother     Heart disease Paternal Grandfather        Social History     Socioeconomic History    Marital status:    Tobacco Use    Smoking status: Never    Smokeless tobacco: Never   Substance and Sexual Activity    Alcohol use: Never    Drug use: Never       Current Outpatient Medications   Medication Sig Dispense Refill    ALPRAZolam (XANAX) 0.5 MG tablet Take 0.5 mg by mouth daily as needed.      metoprolol tartrate (LOPRESSOR) 50 MG tablet Take 1 tablet by mouth 2 (two) times a day.      naproxen sodium (ANAPROX) 220 MG tablet Take 220 mg by mouth daily as  needed.      pantoprazole (PROTONIX) 40 MG tablet Take 1 tablet (40 mg total) by mouth once daily. 30 tablet 2    pregabalin (LYRICA) 75 MG capsule Take 1 capsule (75 mg total) by mouth 2 (two) times daily. 60 capsule 5    sertraline (ZOLOFT) 50 MG tablet Take 50 mg by mouth every morning.      triamterene-hydrochlorothiazide 37.5-25 mg (MAXZIDE-25) 37.5-25 mg per tablet Take 1 tablet by mouth Daily.       No current facility-administered medications for this encounter.       Review of patient's allergies indicates:   Allergen Reactions    Neurontin [gabapentin] Shortness Of Breath    Lisinopril Other (See Comments)     cough    Aspirin Nausea And Vomiting       Objective:  There were no vitals filed for this visit.     GEN: normal appearing, NAD, AAO x3  HENT: NCAT, anicteric, OP benign  CV: normal rate, regular rhythm  RESP: CTA, symmetric rise, unlabored  ABD: soft, ND, no guarding or TTP  SKIN: warm and dry  NEURO: grossly afocal    Assessment and Plan:    Proceed with:    EGD for dysphagia, diarrhea       Matthieu Sweet MD  Gastroenterology

## 2023-08-21 DIAGNOSIS — Z12.11 COLON CANCER SCREENING: Primary | ICD-10-CM

## 2023-08-21 RX ORDER — POLYETHYLENE GLYCOL 3350, SODIUM SULFATE ANHYDROUS, SODIUM BICARBONATE, SODIUM CHLORIDE, POTASSIUM CHLORIDE 236; 22.74; 6.74; 5.86; 2.97 G/4L; G/4L; G/4L; G/4L; G/4L
4 POWDER, FOR SOLUTION ORAL ONCE
Qty: 4000 ML | Refills: 0 | Status: SHIPPED | OUTPATIENT
Start: 2023-08-21 | End: 2023-08-21

## 2023-08-21 NOTE — TELEPHONE ENCOUNTER
----- Message from Taylor Scott sent at 8/21/2023 10:36 AM CDT -----  Regarding: RX  Patient states she never received her RX. She uses the [facility] at [place]. I also emailed her the instructions for prep as she stated she never got them either. Her procedure is scheduled 8/24/23 @ 8:00  [phone]  Thanks, Taylor

## 2023-08-22 NOTE — LETTER
March 24, 2023      [facility] - General Surgery  [address] [place] [address] [place] [address]  Phone: [phone]  Fax: [phone]       Patient: Jonathan Rodriguez   YOB: 1975  Date of Visit: 03/24/2023    To Whom It May Concern:    Francis Rodriguez  was at [facility] on 03/24/2023. The patient may return to work on 3/27/23 with restrictions for no lifting over 20 pounds for 4 weeks. If you have any questions or concerns, or if I can be of further assistance, please do not hesitate to contact me.    Sincerely,    Josue Ramirez M.D      Tosha Garcia from [place] [address] is calling requesting office notes. Nurse states the notes they received were from a phone visit. Nurse states she needs office visit notes from a in-person visit or a video visit in order to see the patient.     Please send new notes to fax number [phone]

## 2023-08-24 ENCOUNTER — HOSPITAL ENCOUNTER (OUTPATIENT)
Dept: GASTROENTEROLOGY | Facility: HOSPITAL | Age: 48
Discharge: HOME | End: 2023-08-24
Payer: COMMERCIAL

## 2023-08-24 ENCOUNTER — ANESTHESIA (OUTPATIENT)
Dept: GASTROENTEROLOGY | Facility: HOSPITAL | Age: 48
End: 2023-08-24
Payer: COMMERCIAL

## 2023-08-24 ENCOUNTER — ANESTHESIA EVENT (OUTPATIENT)
Dept: GASTROENTEROLOGY | Facility: HOSPITAL | Age: 48
End: 2023-08-24
Payer: COMMERCIAL

## 2023-08-24 VITALS
TEMPERATURE: 98 F | DIASTOLIC BLOOD PRESSURE: 116 MMHG | RESPIRATION RATE: 15 BRPM | SYSTOLIC BLOOD PRESSURE: 185 MMHG | BODY MASS INDEX: 45.96 KG/M2 | OXYGEN SATURATION: 98 % | WEIGHT: 286 LBS | HEART RATE: 70 BPM | HEIGHT: 66 IN

## 2023-08-24 DIAGNOSIS — Z12.11 COLON CANCER SCREENING: ICD-10-CM

## 2023-08-24 DIAGNOSIS — Z12.11 SCREENING FOR COLON CANCER: ICD-10-CM

## 2023-08-24 PROCEDURE — 25000003 PHARM REV CODE 250

## 2023-08-24 PROCEDURE — 27201423 OPTIME MED/SURG SUP & DEVICES STERILE SUPPLY

## 2023-08-24 PROCEDURE — 45385 COLONOSCOPY W/LESION REMOVAL: CPT | Mod: PT | Performed by: INTERNAL MEDICINE

## 2023-08-24 PROCEDURE — 88305 TISSUE EXAM BY PATHOLOGIST: CPT | Mod: 26,,, | Performed by: PATHOLOGY

## 2023-08-24 PROCEDURE — 45385 COLONOSCOPY W/LESION REMOVAL: CPT | Mod: 33,,, | Performed by: INTERNAL MEDICINE

## 2023-08-24 PROCEDURE — D9220A PRA ANESTHESIA: ICD-10-PCS | Mod: 33,,,

## 2023-08-24 PROCEDURE — 88305 SURGICAL PATHOLOGY: ICD-10-PCS | Mod: 26,,, | Performed by: PATHOLOGY

## 2023-08-24 PROCEDURE — 37000008 HC ANESTHESIA 1ST 15 MINUTES

## 2023-08-24 PROCEDURE — 63600175 PHARM REV CODE 636 W HCPCS

## 2023-08-24 PROCEDURE — 37000009 HC ANESTHESIA EA ADD 15 MINS

## 2023-08-24 PROCEDURE — 45385 PR COLONOSCOPY,REMV LESN,SNARE: ICD-10-PCS | Mod: 33,,, | Performed by: INTERNAL MEDICINE

## 2023-08-24 PROCEDURE — D9220A PRA ANESTHESIA: Mod: 33,,,

## 2023-08-24 PROCEDURE — 88305 TISSUE EXAM BY PATHOLOGIST: CPT | Mod: TC,SUR | Performed by: INTERNAL MEDICINE

## 2023-08-24 RX ORDER — SODIUM CHLORIDE 9 MG/ML
INJECTION, SOLUTION INTRAVENOUS CONTINUOUS PRN
Status: DISCONTINUED | OUTPATIENT
Start: 2023-08-24 | End: 2023-08-24

## 2023-08-24 RX ORDER — LIDOCAINE HYDROCHLORIDE 20 MG/ML
INJECTION, SOLUTION EPIDURAL; INFILTRATION; INTRACAUDAL; PERINEURAL
Status: DISCONTINUED | OUTPATIENT
Start: 2023-08-24 | End: 2023-08-24

## 2023-08-24 RX ORDER — PROPOFOL 10 MG/ML
VIAL (ML) INTRAVENOUS
Status: DISCONTINUED | OUTPATIENT
Start: 2023-08-24 | End: 2023-08-24

## 2023-08-24 RX ADMIN — PROPOFOL 30 MG: 10 INJECTION, EMULSION INTRAVENOUS at 08:08

## 2023-08-24 RX ADMIN — PROPOFOL 50 MG: 10 INJECTION, EMULSION INTRAVENOUS at 08:08

## 2023-08-24 RX ADMIN — LIDOCAINE HYDROCHLORIDE 100 MG: 20 INJECTION, SOLUTION INTRAVENOUS at 08:08

## 2023-08-24 RX ADMIN — SODIUM CHLORIDE: 9 INJECTION, SOLUTION INTRAVENOUS at 08:08

## 2023-08-24 RX ADMIN — PROPOFOL 150 MG: 10 INJECTION, EMULSION INTRAVENOUS at 08:08

## 2023-08-24 NOTE — ANESTHESIA PREPROCEDURE EVALUATION
08/24/2023  Jonathan Rodriguez is a 47 y.o., female.      Pre-op Assessment    I have reviewed the Patient Summary Reports.     I have reviewed the Nursing Notes. I have reviewed the NPO Status.   I have reviewed the Medications.     Review of Systems  Anesthesia Hx:  No problems with previous Anesthesia    Social:  Non-Smoker, No Alcohol Use    Hematology/Oncology:  Hematology Normal   Oncology Normal     EENT/Dental:EENT/Dental Normal   Cardiovascular:   Hypertension    Pulmonary:  Pulmonary Normal    Renal/:  Renal/ Normal     Hepatic/GI:   GERD    Musculoskeletal:   Arthritis     Neurological:   Seizures    Endocrine:  Endocrine Normal  Morbid Obesity / BMI > 40  Dermatological:  Skin Normal    Psych:   Psychiatric History anxiety          Physical Exam  General: Well nourished, Cooperative, Alert and Oriented    Airway:  Mallampati: II   Mouth Opening: Normal  TM Distance: Normal  Tongue: Normal  Neck ROM: Normal ROM    Dental:  Intact    Chest/Lungs:  Clear to auscultation, Normal Respiratory Rate    Heart:  Rate: Normal  Rhythm: Regular Rhythm  Sounds: Normal    Abdomen:  Normal, Soft, Nontender        Anesthesia Plan  Type of Anesthesia, risks & benefits discussed:    Anesthesia Type: Gen Natural Airway, MAC  Intra-op Monitoring Plan: Standard ASA Monitors  Post Op Pain Control Plan: multimodal analgesia and IV/PO Opioids PRN  Induction:  IV  Informed Consent: Informed consent signed with the Patient and all parties understand the risks and agree with anesthesia plan.  All questions answered.   ASA Score: 3  Day of Surgery Review of History & Physical: I have interviewed and examined the patient. I have reviewed the patient's H&P dated:     Ready For Surgery From Anesthesia Perspective.     .

## 2023-08-24 NOTE — TRANSFER OF CARE
"Anesthesia Transfer of Care Note    Patient: Jonathan Rodriguez    Procedure(s) Performed: colonoscopy    Patient location: GI    Anesthesia Type: general and MAC    Transport from OR: Transported from OR on room air with adequate spontaneous ventilation    Post pain: adequate analgesia    Post assessment: no apparent anesthetic complications    Post vital signs: stable    Level of consciousness: awake, alert and oriented    Nausea/Vomiting: no nausea/vomiting    Complications: none    Transfer of care protocol was followedComments: Refer to nursing note for pain william score upon discharge from recovery      Last vitals:   Visit Vitals  BP (!) 154/89   Pulse 96   Temp 36.6 °C (97.8 °F)   Resp 17   Ht 5' 6" (1.676 m)   Wt 129.7 kg (286 lb)   SpO2 95%   Breastfeeding No   BMI 46.16 kg/m²     "

## 2023-08-24 NOTE — DISCHARGE INSTRUCTIONS
Procedure Date  8/24/23     Impression  Overall Impression:   Subcentimeter polyp in the cecum was removed with cold snare  Lipoma in the ascending colon  The ileocecal valve, transverse colon, descending colon and sigmoid colon appeared normal.  (grade 1) hemorrhoids     Recommendation    Await pathology results     Repeat colonoscopy in 7 years         Outcome of procedure: successful Colonoscopy  Disposition: patient to recovery following procedure; discharge to home when appropriate parameters met  Provisions for follow up: please call my office for any unexpected symptoms like chest or abdominal pain or bleeding following your procedure.  Final Diagnosis: colon polyp    THE NURSE WILL CALL YOU WITH YOUR BIOPSY RESULTS IN A FEW DAYS. NO DRIVING, OPERATING EQUIPMENT, OR SIGNING LEGAL DOCUMENTS FOR 24 HOURS.

## 2023-08-24 NOTE — ANESTHESIA POSTPROCEDURE EVALUATION
Anesthesia Post Evaluation    Patient: Jonathan Need    Procedure(s) Performed: colonoscopy    Final Anesthesia Type: general      Patient location during evaluation: GI PACU  Patient participation: Yes- Able to Participate  Level of consciousness: awake and alert  Post-procedure vital signs: reviewed and stable  Pain management: adequate  Airway patency: patent    PONV status at discharge: No PONV  Anesthetic complications: no      Cardiovascular status: blood pressure returned to baseline  Respiratory status: unassisted and spontaneous ventilation  Hydration status: euvolemic  Follow-up not needed.          Vitals Value Taken Time   /72 08/24/23 0913   Temp 36.6 °C (97.8 °F) 08/24/23 0857   Pulse 73 08/24/23 0915   Resp 13 08/24/23 0915   SpO2 97 % 08/24/23 0915   Vitals shown include unvalidated device data.      No case tracking events are documented in the log.      Pain/Elinor Score: Elinor Score: 10 (8/24/2023  9:10 AM)

## 2023-08-25 LAB
DHEA SERPL-MCNC: NORMAL
ESTROGEN SERPL-MCNC: NORMAL PG/ML
INSULIN SERPL-ACNC: NORMAL U[IU]/ML
LAB AP GROSS DESCRIPTION: NORMAL
LAB AP LABORATORY NOTES: NORMAL
T3RU NFR SERPL: NORMAL %

## 2023-09-14 PROBLEM — Z12.11 COLON CANCER SCREENING: Status: ACTIVE | Noted: 2023-09-14

## 2023-09-14 NOTE — H&P
Gastroenterology Pre-procedure H&P    Chief Complaint: Colon cancer screening    History of Present Illness    Jonathan Rodriguez is a 47 y.o. female that  has a past medical history of Anxiety disorder, unspecified, GERD (gastroesophageal reflux disease), Hypertension, Lumbar pain, Pseudoseizures, and Unspecified osteoarthritis, unspecified site.     Patient here for routine screening. She has chronic constipation.    Patient denies wt loss, abdominal pain, diarrhea, melena/hematochezia, change in stool caliber, no anticoagulants, FMHx of GI related malignancies.      Past Medical History:   Diagnosis Date    Anxiety disorder, unspecified     GERD (gastroesophageal reflux disease)     Hypertension     Lumbar pain     Pseudoseizures     Unspecified osteoarthritis, unspecified site        Past Surgical History:   Procedure Laterality Date    bladder tack      HYSTERECTOMY      LAPAROSCOPIC CHOLECYSTECTOMY N/A 3/10/2023    Procedure: CHOLECYSTECTOMY, LAPAROSCOPIC;  Surgeon: Josue Ramirez MD;  Location: [facility];  Service: General;  Laterality: N/A;       Family History   Problem Relation Age of Onset    Hypertension Mother     Diabetes Mother     Diabetes Father     Hypertension Sister     Diabetes Maternal Aunt     Cancer Paternal Aunt     Cancer Paternal Uncle     Heart disease Maternal Grandmother     Diabetes Maternal Grandfather     Heart disease Maternal Grandfather     Heart disease Paternal Grandmother     Heart disease Paternal Grandfather        Social History     Socioeconomic History    Marital status:    Tobacco Use    Smoking status: Never    Smokeless tobacco: Never   Substance and Sexual Activity    Alcohol use: Never    Drug use: Never       Current Outpatient Medications   Medication Sig Dispense Refill    ALPRAZolam (XANAX) 0.5 MG tablet Take 0.5 mg by mouth daily as needed.      cholestyramine (QUESTRAN) 4 gram packet Take 1 packet (4 g total) by mouth 2 (two) times daily. 60 packet 11     "metoprolol tartrate (LOPRESSOR) 50 MG tablet Take 1 tablet by mouth 2 (two) times a day.      naproxen sodium (ANAPROX) 220 MG tablet Take 220 mg by mouth daily as needed.      pantoprazole (PROTONIX) 40 MG tablet Take 1 tablet (40 mg total) by mouth once daily. 30 tablet 2    pregabalin (LYRICA) 75 MG capsule Take 1 capsule (75 mg total) by mouth 2 (two) times daily. 60 capsule 5    sertraline (ZOLOFT) 50 MG tablet Take 50 mg by mouth every morning.      triamterene-hydrochlorothiazide 37.5-25 mg (MAXZIDE-25) 37.5-25 mg per tablet Take 1 tablet by mouth Daily.       No current facility-administered medications for this encounter.       Review of patient's allergies indicates:   Allergen Reactions    Neurontin [gabapentin] Shortness Of Breath    Lisinopril Other (See Comments)     cough    Aspirin Nausea And Vomiting       Objective:  Vitals:    08/24/23 0826 08/24/23 0857 08/24/23 0900 08/24/23 0905   BP: (!) 156/92 (!) 154/89 133/78 135/74   Pulse: 76 96 100 78   Resp: 10 17 13 (!) 22   Temp:  97.8 °F (36.6 °C)     SpO2: 96% 95% 96% 95%   Weight: 129.7 kg (286 lb)      Height: 5' 6" (1.676 m)       08/24/23 0910 08/24/23 0915 08/24/23 0920 08/24/23 0925   BP: (!) 141/79 134/72 (!) 152/92 (!) 185/116   Pulse: 76 74 78 70   Resp: (!) 25 18 (!) 21 15   Temp:       SpO2: (!) 94% 95% 97% 98%   Weight:       Height:            GEN: normal appearing, NAD, AAO x3  HENT: NCAT, anicteric, OP benign  CV: normal rate, regular rhythm  RESP: NABS, symmetric rise, unlabored  ABD: soft, ND, no guarding or TTP  SKIN: warm and dry  NEURO: grossly afocal    Assessment and Plan:    Proceed with:    Colonoscopy for screening for colon cancer    Matthieu Sweet MD  Gastroenterology    "

## 2023-11-15 ENCOUNTER — OFFICE VISIT (OUTPATIENT)
Dept: FAMILY MEDICINE | Facility: CLINIC | Age: 48
End: 2023-11-15
Payer: COMMERCIAL

## 2023-11-15 VITALS
OXYGEN SATURATION: 96 % | BODY MASS INDEX: 44.14 KG/M2 | RESPIRATION RATE: 17 BRPM | HEART RATE: 75 BPM | TEMPERATURE: 98 F | DIASTOLIC BLOOD PRESSURE: 98 MMHG | SYSTOLIC BLOOD PRESSURE: 152 MMHG | HEIGHT: 66 IN | WEIGHT: 274.63 LBS

## 2023-11-15 DIAGNOSIS — K21.9 GASTROESOPHAGEAL REFLUX DISEASE, UNSPECIFIED WHETHER ESOPHAGITIS PRESENT: ICD-10-CM

## 2023-11-15 DIAGNOSIS — B35.3 TINEA PEDIS, UNSPECIFIED LATERALITY: ICD-10-CM

## 2023-11-15 DIAGNOSIS — Z11.3 ROUTINE SCREENING FOR STI (SEXUALLY TRANSMITTED INFECTION): ICD-10-CM

## 2023-11-15 DIAGNOSIS — R01.1 SYSTOLIC MURMUR: ICD-10-CM

## 2023-11-15 DIAGNOSIS — Z01.89 ENCOUNTER FOR LABORATORY EXAMINATION: Primary | ICD-10-CM

## 2023-11-15 DIAGNOSIS — I10 HYPERTENSION, UNSPECIFIED TYPE: ICD-10-CM

## 2023-11-15 DIAGNOSIS — Z12.39 ENCOUNTER FOR SCREENING FOR MALIGNANT NEOPLASM OF BREAST, UNSPECIFIED SCREENING MODALITY: ICD-10-CM

## 2023-11-15 LAB
ALBUMIN SERPL BCP-MCNC: 3.5 G/DL (ref 3.5–5)
ALBUMIN/GLOB SERPL: 1 {RATIO}
ALP SERPL-CCNC: 102 U/L (ref 39–100)
ALT SERPL W P-5'-P-CCNC: 31 U/L (ref 13–56)
ANION GAP SERPL CALCULATED.3IONS-SCNC: 9 MMOL/L (ref 7–16)
AST SERPL W P-5'-P-CCNC: 12 U/L (ref 15–37)
BASOPHILS # BLD AUTO: 0.08 K/UL (ref 0–0.2)
BASOPHILS NFR BLD AUTO: 0.8 % (ref 0–1)
BILIRUB SERPL-MCNC: 0.6 MG/DL (ref ?–1.2)
BUN SERPL-MCNC: 15 MG/DL (ref 7–18)
BUN/CREAT SERPL: 18 (ref 6–20)
CALCIUM SERPL-MCNC: 9.3 MG/DL (ref 8.5–10.1)
CHLORIDE SERPL-SCNC: 107 MMOL/L (ref 98–107)
CHOLEST SERPL-MCNC: 142 MG/DL (ref 0–200)
CHOLEST/HDLC SERPL: 3 {RATIO}
CO2 SERPL-SCNC: 29 MMOL/L (ref 21–32)
CREAT SERPL-MCNC: 0.84 MG/DL (ref 0.55–1.02)
DIFFERENTIAL METHOD BLD: ABNORMAL
EGFR (NO RACE VARIABLE) (RUSH/TITUS): 86 ML/MIN/1.73M2
EOSINOPHIL # BLD AUTO: 0.28 K/UL (ref 0–0.5)
EOSINOPHIL NFR BLD AUTO: 2.7 % (ref 1–4)
ERYTHROCYTE [DISTWIDTH] IN BLOOD BY AUTOMATED COUNT: 13.5 % (ref 11.5–14.5)
EST. AVERAGE GLUCOSE BLD GHB EST-MCNC: 105 MG/DL
GLOBULIN SER-MCNC: 3.6 G/DL (ref 2–4)
GLUCOSE SERPL-MCNC: 82 MG/DL (ref 74–106)
HBA1C MFR BLD HPLC: 5.3 % (ref 4.5–6.6)
HCT VFR BLD AUTO: 43.8 % (ref 38–47)
HCV AB SER QL: NORMAL
HDLC SERPL-MCNC: 47 MG/DL (ref 40–60)
HGB BLD-MCNC: 14.4 G/DL (ref 12–16)
HIV 1+O+2 AB SERPL QL: NORMAL
IMM GRANULOCYTES # BLD AUTO: 0.03 K/UL (ref 0–0.04)
IMM GRANULOCYTES NFR BLD: 0.3 % (ref 0–0.4)
LDLC SERPL CALC-MCNC: 75 MG/DL
LDLC/HDLC SERPL: 1.6 {RATIO}
LYMPHOCYTES # BLD AUTO: 2.88 K/UL (ref 1–4.8)
LYMPHOCYTES NFR BLD AUTO: 27.4 % (ref 27–41)
MCH RBC QN AUTO: 28.4 PG (ref 27–31)
MCHC RBC AUTO-ENTMCNC: 32.9 G/DL (ref 32–36)
MCV RBC AUTO: 86.4 FL (ref 80–96)
MONOCYTES # BLD AUTO: 0.93 K/UL (ref 0–0.8)
MONOCYTES NFR BLD AUTO: 8.8 % (ref 2–6)
MPC BLD CALC-MCNC: 10.5 FL (ref 9.4–12.4)
NEUTROPHILS # BLD AUTO: 6.32 K/UL (ref 1.8–7.7)
NEUTROPHILS NFR BLD AUTO: 60 % (ref 53–65)
NONHDLC SERPL-MCNC: 95 MG/DL
NRBC # BLD AUTO: 0 X10E3/UL
NRBC, AUTO (.00): 0 %
PLATELET # BLD AUTO: 350 K/UL (ref 150–400)
POTASSIUM SERPL-SCNC: 4.1 MMOL/L (ref 3.5–5.1)
PROT SERPL-MCNC: 7.1 G/DL (ref 6.4–8.2)
RBC # BLD AUTO: 5.07 M/UL (ref 4.2–5.4)
SODIUM SERPL-SCNC: 141 MMOL/L (ref 136–145)
TRIGL SERPL-MCNC: 98 MG/DL (ref 35–150)
TSH SERPL DL<=0.005 MIU/L-ACNC: 1.76 UIU/ML (ref 0.36–3.74)
VLDLC SERPL-MCNC: 20 MG/DL
WBC # BLD AUTO: 10.52 K/UL (ref 4.5–11)

## 2023-11-15 PROCEDURE — 4010F ACE/ARB THERAPY RXD/TAKEN: CPT | Mod: CPTII,,, | Performed by: FAMILY MEDICINE

## 2023-11-15 PROCEDURE — 80061 LIPID PANEL: CPT | Mod: ,,, | Performed by: CLINICAL MEDICAL LABORATORY

## 2023-11-15 PROCEDURE — 86803 HEPATITIS C ANTIBODY: ICD-10-PCS | Mod: ,,, | Performed by: CLINICAL MEDICAL LABORATORY

## 2023-11-15 PROCEDURE — 3008F BODY MASS INDEX DOCD: CPT | Mod: CPTII,,, | Performed by: FAMILY MEDICINE

## 2023-11-15 PROCEDURE — 99214 PR OFFICE/OUTPT VISIT, EST, LEVL IV, 30-39 MIN: ICD-10-PCS | Mod: ,,, | Performed by: FAMILY MEDICINE

## 2023-11-15 PROCEDURE — 3080F PR MOST RECENT DIASTOLIC BLOOD PRESSURE >= 90 MM HG: ICD-10-PCS | Mod: CPTII,,, | Performed by: FAMILY MEDICINE

## 2023-11-15 PROCEDURE — 86803 HEPATITIS C AB TEST: CPT | Mod: ,,, | Performed by: CLINICAL MEDICAL LABORATORY

## 2023-11-15 PROCEDURE — 3077F PR MOST RECENT SYSTOLIC BLOOD PRESSURE >= 140 MM HG: ICD-10-PCS | Mod: CPTII,,, | Performed by: FAMILY MEDICINE

## 2023-11-15 PROCEDURE — 3008F PR BODY MASS INDEX (BMI) DOCUMENTED: ICD-10-PCS | Mod: CPTII,,, | Performed by: FAMILY MEDICINE

## 2023-11-15 PROCEDURE — 80050 GENERAL HEALTH PANEL: CPT | Mod: ,,, | Performed by: CLINICAL MEDICAL LABORATORY

## 2023-11-15 PROCEDURE — 80061 LIPID PANEL: ICD-10-PCS | Mod: ,,, | Performed by: CLINICAL MEDICAL LABORATORY

## 2023-11-15 PROCEDURE — 3077F SYST BP >= 140 MM HG: CPT | Mod: CPTII,,, | Performed by: FAMILY MEDICINE

## 2023-11-15 PROCEDURE — 80050 PR  GENERAL HEALTH PANEL: ICD-10-PCS | Mod: ,,, | Performed by: CLINICAL MEDICAL LABORATORY

## 2023-11-15 PROCEDURE — 83036 HEMOGLOBIN GLYCOSYLATED A1C: CPT | Mod: ,,, | Performed by: CLINICAL MEDICAL LABORATORY

## 2023-11-15 PROCEDURE — 87389 HIV 1 / 2 ANTIBODY: ICD-10-PCS | Mod: ,,, | Performed by: CLINICAL MEDICAL LABORATORY

## 2023-11-15 PROCEDURE — 4010F PR ACE/ARB THEARPY RXD/TAKEN: ICD-10-PCS | Mod: CPTII,,, | Performed by: FAMILY MEDICINE

## 2023-11-15 PROCEDURE — 83036 HEMOGLOBIN A1C: ICD-10-PCS | Mod: ,,, | Performed by: CLINICAL MEDICAL LABORATORY

## 2023-11-15 PROCEDURE — 3080F DIAST BP >= 90 MM HG: CPT | Mod: CPTII,,, | Performed by: FAMILY MEDICINE

## 2023-11-15 PROCEDURE — 87389 HIV-1 AG W/HIV-1&-2 AB AG IA: CPT | Mod: ,,, | Performed by: CLINICAL MEDICAL LABORATORY

## 2023-11-15 PROCEDURE — 99214 OFFICE O/P EST MOD 30 MIN: CPT | Mod: ,,, | Performed by: FAMILY MEDICINE

## 2023-11-15 RX ORDER — KETOCONAZOLE 20 MG/G
CREAM TOPICAL DAILY
Qty: 30 G | Refills: 0 | Status: SHIPPED | OUTPATIENT
Start: 2023-11-15 | End: 2023-12-06 | Stop reason: SDUPTHER

## 2023-11-15 RX ORDER — PANTOPRAZOLE SODIUM 40 MG/1
40 TABLET, DELAYED RELEASE ORAL DAILY
Qty: 90 TABLET | Refills: 1 | Status: SHIPPED | OUTPATIENT
Start: 2023-11-15 | End: 2024-05-13

## 2023-11-15 RX ORDER — METOPROLOL TARTRATE 50 MG/1
50 TABLET ORAL 2 TIMES DAILY
Qty: 180 TABLET | Refills: 1 | Status: SHIPPED | OUTPATIENT
Start: 2023-11-15 | End: 2024-05-13

## 2023-11-15 RX ORDER — LOSARTAN POTASSIUM 25 MG/1
25 TABLET ORAL DAILY
Qty: 90 TABLET | Refills: 1 | Status: SHIPPED | OUTPATIENT
Start: 2023-11-15 | End: 2023-12-06 | Stop reason: ALTCHOICE

## 2023-11-15 RX ORDER — TRIAMTERENE/HYDROCHLOROTHIAZID 37.5-25 MG
1 TABLET ORAL DAILY
Qty: 90 TABLET | Refills: 1 | Status: SHIPPED | OUTPATIENT
Start: 2023-11-15 | End: 2024-05-13

## 2023-11-15 NOTE — PROGRESS NOTES
Subjective     Patient ID: Jonathan Rodriguez is a 48 y.o. female.    Chief Complaint: Hypertension, Abrasion (Left foot ), and Medication Refill    48 year old female with uncontrolled HTN came to the clinic. Patient has not seen a doctor in a few years and has been out of her medications. She has HTN with systolic averaging between 170-205 at home. She used to take a lopressor BID and a combination HCTZ  and reports even back then bp was uncontrolled and PCP wanted to start her on anohter medications  She has GERD and had a EGD with dilation less than a year ago with a colonoscopy  Patient has pseudoseizure and sees a neurologist in [place].  Patient has a chronic one year old wound on the medial side of the left ankle. She reports it started as ant bites and has not healed yet.     Review of Systems   Constitutional:  Negative for activity change, fatigue and fever.   Respiratory:  Negative for shortness of breath, wheezing and stridor.    Cardiovascular:  Negative for chest pain, leg swelling and claudication.   Gastrointestinal:  Negative for abdominal distention, abdominal pain and anal bleeding.   Neurological:  Negative for dizziness, tremors, weakness and coordination difficulties.          Objective   Vitals:    11/15/23 1343   BP: (!) 152/98   Pulse:    Resp:    Temp:        Physical Exam  Vitals and nursing note reviewed.   Constitutional:       General: She is not in acute distress.     Appearance: Normal appearance. She is normal weight. She is not ill-appearing or toxic-appearing.   HENT:      Head: Normocephalic.      Right Ear: External ear normal.      Left Ear: External ear normal.      Nose: Nose normal.      Mouth/Throat:      Mouth: Mucous membranes are moist.   Eyes:      Conjunctiva/sclera: Conjunctivae normal.   Cardiovascular:      Rate and Rhythm: Normal rate and regular rhythm.      Pulses: Normal pulses.      Heart sounds: Murmur heard.   Pulmonary:      Effort: Pulmonary effort is normal. No  respiratory distress.      Breath sounds: Normal breath sounds. No wheezing.   Abdominal:      General: Abdomen is flat.   Musculoskeletal:      Cervical back: Neck supple. No tenderness.        Feet:    Skin:     Findings: Lesion present.   Neurological:      Mental Status: She is alert.          Assessment and Plan     1. Encounter for laboratory examination  -     Cancel: Hemoglobin A1C; Future; Expected date: 11/15/2023  -     Cancel: Comprehensive Metabolic Panel; Future; Expected date: 11/15/2023  -     Cancel: CBC Auto Differential; Future; Expected date: 11/15/2023  -     Cancel: HIV 1/2 Ag/Ab (4th Gen); Future; Expected date: 11/15/2023  -     Cancel: Hepatitis C Antibody; Future; Expected date: 11/15/2023  -     Cancel: TSH; Future; Expected date: 11/15/2023  -     Cancel: Lipid Panel; Future; Expected date: 11/15/2023  -     Cancel: LIPID PANEL; Future; Expected date: 11/15/2023  -     Cancel: TSH; Future; Expected date: 11/15/2023  -     Cancel: Hepatitis C Antibody; Future; Expected date: 11/15/2023  -     Cancel: HIV 1/2 Ag/Ab (4th Gen); Future; Expected date: 11/15/2023  -     Cancel: CBC Auto Differential; Future; Expected date: 11/15/2023  -     Cancel: Comprehensive Metabolic Panel; Future; Expected date: 11/15/2023  -     Cancel: Hemoglobin A1C; Future; Expected date: 11/15/2023  -     Hemoglobin A1C; Future; Expected date: 11/15/2023  -     Comprehensive Metabolic Panel; Future; Expected date: 11/15/2023  -     CBC Auto Differential; Future; Expected date: 11/15/2023  -     HIV 1/2 Ag/Ab (4th Gen); Future; Expected date: 11/15/2023  -     Hepatitis C Antibody; Future; Expected date: 11/15/2023  -     TSH; Future; Expected date: 11/15/2023  -     LIPID PANEL; Future; Expected date: 11/15/2023    2. Hypertension, unspecified type  Assessment & Plan:  Continue lopressor 50 mg BID  Continue combination HCTZ pill  Added losartan  Logs are provided  Patient will return to clinic in 2-3 weeks for  reevaluation  Was told to bring logs and her home bp machine     Orders:  -     Cancel: Comprehensive Metabolic Panel; Future; Expected date: 11/15/2023  -     Cancel: CBC Auto Differential; Future; Expected date: 11/15/2023  -     Cancel: TSH; Future; Expected date: 11/15/2023  -     Cancel: Lipid Panel; Future; Expected date: 11/15/2023  -     losartan (COZAAR) 25 MG tablet; Take 1 tablet (25 mg total) by mouth once daily.  Dispense: 90 tablet; Refill: 1  -     Cancel: LIPID PANEL; Future; Expected date: 11/15/2023  -     Cancel: TSH; Future; Expected date: 11/15/2023  -     Cancel: Hepatitis C Antibody; Future; Expected date: 11/15/2023  -     Cancel: HIV 1/2 Ag/Ab (4th Gen); Future; Expected date: 11/15/2023  -     Cancel: CBC Auto Differential; Future; Expected date: 11/15/2023  -     Cancel: Comprehensive Metabolic Panel; Future; Expected date: 11/15/2023  -     Cancel: Hemoglobin A1C; Future; Expected date: 11/15/2023  -     Hemoglobin A1C; Future; Expected date: 11/15/2023  -     Comprehensive Metabolic Panel; Future; Expected date: 11/15/2023  -     CBC Auto Differential; Future; Expected date: 11/15/2023  -     HIV 1/2 Ag/Ab (4th Gen); Future; Expected date: 11/15/2023  -     Hepatitis C Antibody; Future; Expected date: 11/15/2023  -     TSH; Future; Expected date: 11/15/2023  -     LIPID PANEL; Future; Expected date: 11/15/2023    3. Encounter for screening for malignant neoplasm of breast, unspecified screening modality  -     Mammo Digital Screening Bilat w/ Norm; Future; Expected date: 11/15/2023  -     Cancel: LIPID PANEL; Future; Expected date: 11/15/2023  -     Cancel: TSH; Future; Expected date: 11/15/2023  -     Cancel: Hepatitis C Antibody; Future; Expected date: 11/15/2023  -     Cancel: HIV 1/2 Ag/Ab (4th Gen); Future; Expected date: 11/15/2023  -     Cancel: CBC Auto Differential; Future; Expected date: 11/15/2023  -     Cancel: Comprehensive Metabolic Panel; Future; Expected date:  11/15/2023  -     Cancel: Hemoglobin A1C; Future; Expected date: 11/15/2023  -     Hemoglobin A1C; Future; Expected date: 11/15/2023  -     Comprehensive Metabolic Panel; Future; Expected date: 11/15/2023  -     CBC Auto Differential; Future; Expected date: 11/15/2023  -     HIV 1/2 Ag/Ab (4th Gen); Future; Expected date: 11/15/2023  -     Hepatitis C Antibody; Future; Expected date: 11/15/2023  -     TSH; Future; Expected date: 11/15/2023  -     LIPID PANEL; Future; Expected date: 11/15/2023    4. Routine screening for STI (sexually transmitted infection)  -     Cancel: HIV 1/2 Ag/Ab (4th Gen); Future; Expected date: 11/15/2023  -     Cancel: Hepatitis C Antibody; Future; Expected date: 11/15/2023  -     Cancel: LIPID PANEL; Future; Expected date: 11/15/2023  -     Cancel: TSH; Future; Expected date: 11/15/2023  -     Cancel: Hepatitis C Antibody; Future; Expected date: 11/15/2023  -     Cancel: HIV 1/2 Ag/Ab (4th Gen); Future; Expected date: 11/15/2023  -     Cancel: CBC Auto Differential; Future; Expected date: 11/15/2023  -     Cancel: Comprehensive Metabolic Panel; Future; Expected date: 11/15/2023  -     Cancel: Hemoglobin A1C; Future; Expected date: 11/15/2023  -     Hemoglobin A1C; Future; Expected date: 11/15/2023  -     Comprehensive Metabolic Panel; Future; Expected date: 11/15/2023  -     CBC Auto Differential; Future; Expected date: 11/15/2023  -     HIV 1/2 Ag/Ab (4th Gen); Future; Expected date: 11/15/2023  -     Hepatitis C Antibody; Future; Expected date: 11/15/2023  -     TSH; Future; Expected date: 11/15/2023  -     LIPID PANEL; Future; Expected date: 11/15/2023    5. Gastroesophageal reflux disease, unspecified whether esophagitis present  -     pantoprazole (PROTONIX) 40 MG tablet; Take 1 tablet (40 mg total) by mouth once daily.  Dispense: 90 tablet; Refill: 1  -     Cancel: LIPID PANEL; Future; Expected date: 11/15/2023  -     Cancel: TSH; Future; Expected date: 11/15/2023  -     Cancel:  Hepatitis C Antibody; Future; Expected date: 11/15/2023  -     Cancel: HIV 1/2 Ag/Ab (4th Gen); Future; Expected date: 11/15/2023  -     Cancel: CBC Auto Differential; Future; Expected date: 11/15/2023  -     Cancel: Comprehensive Metabolic Panel; Future; Expected date: 11/15/2023  -     Cancel: Hemoglobin A1C; Future; Expected date: 11/15/2023  -     Hemoglobin A1C; Future; Expected date: 11/15/2023  -     Comprehensive Metabolic Panel; Future; Expected date: 11/15/2023  -     CBC Auto Differential; Future; Expected date: 11/15/2023  -     HIV 1/2 Ag/Ab (4th Gen); Future; Expected date: 11/15/2023  -     Hepatitis C Antibody; Future; Expected date: 11/15/2023  -     TSH; Future; Expected date: 11/15/2023  -     LIPID PANEL; Future; Expected date: 11/15/2023    6. Tinea pedis, unspecified laterality  Assessment & Plan:  Will treat the lesion with ketoconazole for 2-3 weeks.  Patient will return to clinic for reevaluation and further management.  It could be an inflammatory issue rather than yeast.     Orders:  -     ketoconazole (NIZORAL) 2 % cream; Apply topically once daily.  Dispense: 30 g; Refill: 0  -     Cancel: LIPID PANEL; Future; Expected date: 11/15/2023  -     Cancel: TSH; Future; Expected date: 11/15/2023  -     Cancel: Hepatitis C Antibody; Future; Expected date: 11/15/2023  -     Cancel: HIV 1/2 Ag/Ab (4th Gen); Future; Expected date: 11/15/2023  -     Cancel: CBC Auto Differential; Future; Expected date: 11/15/2023  -     Cancel: Comprehensive Metabolic Panel; Future; Expected date: 11/15/2023  -     Cancel: Hemoglobin A1C; Future; Expected date: 11/15/2023  -     Hemoglobin A1C; Future; Expected date: 11/15/2023  -     Comprehensive Metabolic Panel; Future; Expected date: 11/15/2023  -     CBC Auto Differential; Future; Expected date: 11/15/2023  -     HIV 1/2 Ag/Ab (4th Gen); Future; Expected date: 11/15/2023  -     Hepatitis C Antibody; Future; Expected date: 11/15/2023  -     TSH; Future; Expected  date: 11/15/2023  -     LIPID PANEL; Future; Expected date: 11/15/2023    7. Systolic murmur  Assessment & Plan:  Will refer the patient to get an Echo  Patient is unaware of the murmur  Denies Hx of IV drug use or rheumatic fever    Orders:  -     Echo; Future  -     Cancel: LIPID PANEL; Future; Expected date: 11/15/2023  -     Cancel: TSH; Future; Expected date: 11/15/2023  -     Cancel: Hepatitis C Antibody; Future; Expected date: 11/15/2023  -     Cancel: HIV 1/2 Ag/Ab (4th Gen); Future; Expected date: 11/15/2023  -     Cancel: CBC Auto Differential; Future; Expected date: 11/15/2023  -     Cancel: Comprehensive Metabolic Panel; Future; Expected date: 11/15/2023  -     Cancel: Hemoglobin A1C; Future; Expected date: 11/15/2023  -     Hemoglobin A1C; Future; Expected date: 11/15/2023  -     Comprehensive Metabolic Panel; Future; Expected date: 11/15/2023  -     CBC Auto Differential; Future; Expected date: 11/15/2023  -     HIV 1/2 Ag/Ab (4th Gen); Future; Expected date: 11/15/2023  -     Hepatitis C Antibody; Future; Expected date: 11/15/2023  -     TSH; Future; Expected date: 11/15/2023  -     LIPID PANEL; Future; Expected date: 11/15/2023    Other orders  -     triamterene-hydrochlorothiazide 37.5-25 mg (MAXZIDE-25) 37.5-25 mg per tablet; Take 1 tablet by mouth Daily.  Dispense: 90 tablet; Refill: 1  -     metoprolol tartrate (LOPRESSOR) 50 MG tablet; Take 1 tablet (50 mg total) by mouth 2 (two) times a day.  Dispense: 180 tablet; Refill: 1        Encounter for laboratory examination  -     Cancel: Hemoglobin A1C; Future; Expected date: 11/15/2023  -     Cancel: Comprehensive Metabolic Panel; Future; Expected date: 11/15/2023  -     Cancel: CBC Auto Differential; Future; Expected date: 11/15/2023  -     Cancel: HIV 1/2 Ag/Ab (4th Gen); Future; Expected date: 11/15/2023  -     Cancel: Hepatitis C Antibody; Future; Expected date: 11/15/2023  -     Cancel: TSH; Future; Expected date: 11/15/2023  -     Cancel:  Lipid Panel; Future; Expected date: 11/15/2023  -     Cancel: LIPID PANEL; Future; Expected date: 11/15/2023  -     Cancel: TSH; Future; Expected date: 11/15/2023  -     Cancel: Hepatitis C Antibody; Future; Expected date: 11/15/2023  -     Cancel: HIV 1/2 Ag/Ab (4th Gen); Future; Expected date: 11/15/2023  -     Cancel: CBC Auto Differential; Future; Expected date: 11/15/2023  -     Cancel: Comprehensive Metabolic Panel; Future; Expected date: 11/15/2023  -     Cancel: Hemoglobin A1C; Future; Expected date: 11/15/2023  -     Hemoglobin A1C; Future; Expected date: 11/15/2023  -     Comprehensive Metabolic Panel; Future; Expected date: 11/15/2023  -     CBC Auto Differential; Future; Expected date: 11/15/2023  -     HIV 1/2 Ag/Ab (4th Gen); Future; Expected date: 11/15/2023  -     Hepatitis C Antibody; Future; Expected date: 11/15/2023  -     TSH; Future; Expected date: 11/15/2023  -     LIPID PANEL; Future; Expected date: 11/15/2023    Hypertension, unspecified type  -     Cancel: Comprehensive Metabolic Panel; Future; Expected date: 11/15/2023  -     Cancel: CBC Auto Differential; Future; Expected date: 11/15/2023  -     Cancel: TSH; Future; Expected date: 11/15/2023  -     Cancel: Lipid Panel; Future; Expected date: 11/15/2023  -     losartan (COZAAR) 25 MG tablet; Take 1 tablet (25 mg total) by mouth once daily.  Dispense: 90 tablet; Refill: 1  -     Cancel: LIPID PANEL; Future; Expected date: 11/15/2023  -     Cancel: TSH; Future; Expected date: 11/15/2023  -     Cancel: Hepatitis C Antibody; Future; Expected date: 11/15/2023  -     Cancel: HIV 1/2 Ag/Ab (4th Gen); Future; Expected date: 11/15/2023  -     Cancel: CBC Auto Differential; Future; Expected date: 11/15/2023  -     Cancel: Comprehensive Metabolic Panel; Future; Expected date: 11/15/2023  -     Cancel: Hemoglobin A1C; Future; Expected date: 11/15/2023  -     Hemoglobin A1C; Future; Expected date: 11/15/2023  -     Comprehensive Metabolic Panel; Future;  Expected date: 11/15/2023  -     CBC Auto Differential; Future; Expected date: 11/15/2023  -     HIV 1/2 Ag/Ab (4th Gen); Future; Expected date: 11/15/2023  -     Hepatitis C Antibody; Future; Expected date: 11/15/2023  -     TSH; Future; Expected date: 11/15/2023  -     LIPID PANEL; Future; Expected date: 11/15/2023    Encounter for screening for malignant neoplasm of breast, unspecified screening modality  -     Mammo Digital Screening Bilat w/ Norm; Future; Expected date: 11/15/2023  -     Cancel: LIPID PANEL; Future; Expected date: 11/15/2023  -     Cancel: TSH; Future; Expected date: 11/15/2023  -     Cancel: Hepatitis C Antibody; Future; Expected date: 11/15/2023  -     Cancel: HIV 1/2 Ag/Ab (4th Gen); Future; Expected date: 11/15/2023  -     Cancel: CBC Auto Differential; Future; Expected date: 11/15/2023  -     Cancel: Comprehensive Metabolic Panel; Future; Expected date: 11/15/2023  -     Cancel: Hemoglobin A1C; Future; Expected date: 11/15/2023  -     Hemoglobin A1C; Future; Expected date: 11/15/2023  -     Comprehensive Metabolic Panel; Future; Expected date: 11/15/2023  -     CBC Auto Differential; Future; Expected date: 11/15/2023  -     HIV 1/2 Ag/Ab (4th Gen); Future; Expected date: 11/15/2023  -     Hepatitis C Antibody; Future; Expected date: 11/15/2023  -     TSH; Future; Expected date: 11/15/2023  -     LIPID PANEL; Future; Expected date: 11/15/2023    Routine screening for STI (sexually transmitted infection)  -     Cancel: HIV 1/2 Ag/Ab (4th Gen); Future; Expected date: 11/15/2023  -     Cancel: Hepatitis C Antibody; Future; Expected date: 11/15/2023  -     Cancel: LIPID PANEL; Future; Expected date: 11/15/2023  -     Cancel: TSH; Future; Expected date: 11/15/2023  -     Cancel: Hepatitis C Antibody; Future; Expected date: 11/15/2023  -     Cancel: HIV 1/2 Ag/Ab (4th Gen); Future; Expected date: 11/15/2023  -     Cancel: CBC Auto Differential; Future; Expected date: 11/15/2023  -     Cancel:  Comprehensive Metabolic Panel; Future; Expected date: 11/15/2023  -     Cancel: Hemoglobin A1C; Future; Expected date: 11/15/2023  -     Hemoglobin A1C; Future; Expected date: 11/15/2023  -     Comprehensive Metabolic Panel; Future; Expected date: 11/15/2023  -     CBC Auto Differential; Future; Expected date: 11/15/2023  -     HIV 1/2 Ag/Ab (4th Gen); Future; Expected date: 11/15/2023  -     Hepatitis C Antibody; Future; Expected date: 11/15/2023  -     TSH; Future; Expected date: 11/15/2023  -     LIPID PANEL; Future; Expected date: 11/15/2023    Gastroesophageal reflux disease, unspecified whether esophagitis present  -     pantoprazole (PROTONIX) 40 MG tablet; Take 1 tablet (40 mg total) by mouth once daily.  Dispense: 90 tablet; Refill: 1  -     Cancel: LIPID PANEL; Future; Expected date: 11/15/2023  -     Cancel: TSH; Future; Expected date: 11/15/2023  -     Cancel: Hepatitis C Antibody; Future; Expected date: 11/15/2023  -     Cancel: HIV 1/2 Ag/Ab (4th Gen); Future; Expected date: 11/15/2023  -     Cancel: CBC Auto Differential; Future; Expected date: 11/15/2023  -     Cancel: Comprehensive Metabolic Panel; Future; Expected date: 11/15/2023  -     Cancel: Hemoglobin A1C; Future; Expected date: 11/15/2023  -     Hemoglobin A1C; Future; Expected date: 11/15/2023  -     Comprehensive Metabolic Panel; Future; Expected date: 11/15/2023  -     CBC Auto Differential; Future; Expected date: 11/15/2023  -     HIV 1/2 Ag/Ab (4th Gen); Future; Expected date: 11/15/2023  -     Hepatitis C Antibody; Future; Expected date: 11/15/2023  -     TSH; Future; Expected date: 11/15/2023  -     LIPID PANEL; Future; Expected date: 11/15/2023    Tinea pedis, unspecified laterality  -     ketoconazole (NIZORAL) 2 % cream; Apply topically once daily.  Dispense: 30 g; Refill: 0  -     Cancel: LIPID PANEL; Future; Expected date: 11/15/2023  -     Cancel: TSH; Future; Expected date: 11/15/2023  -     Cancel: Hepatitis C Antibody; Future;  Expected date: 11/15/2023  -     Cancel: HIV 1/2 Ag/Ab (4th Gen); Future; Expected date: 11/15/2023  -     Cancel: CBC Auto Differential; Future; Expected date: 11/15/2023  -     Cancel: Comprehensive Metabolic Panel; Future; Expected date: 11/15/2023  -     Cancel: Hemoglobin A1C; Future; Expected date: 11/15/2023  -     Hemoglobin A1C; Future; Expected date: 11/15/2023  -     Comprehensive Metabolic Panel; Future; Expected date: 11/15/2023  -     CBC Auto Differential; Future; Expected date: 11/15/2023  -     HIV 1/2 Ag/Ab (4th Gen); Future; Expected date: 11/15/2023  -     Hepatitis C Antibody; Future; Expected date: 11/15/2023  -     TSH; Future; Expected date: 11/15/2023  -     LIPID PANEL; Future; Expected date: 11/15/2023    Systolic murmur  -     Echo; Future  -     Cancel: LIPID PANEL; Future; Expected date: 11/15/2023  -     Cancel: TSH; Future; Expected date: 11/15/2023  -     Cancel: Hepatitis C Antibody; Future; Expected date: 11/15/2023  -     Cancel: HIV 1/2 Ag/Ab (4th Gen); Future; Expected date: 11/15/2023  -     Cancel: CBC Auto Differential; Future; Expected date: 11/15/2023  -     Cancel: Comprehensive Metabolic Panel; Future; Expected date: 11/15/2023  -     Cancel: Hemoglobin A1C; Future; Expected date: 11/15/2023  -     Hemoglobin A1C; Future; Expected date: 11/15/2023  -     Comprehensive Metabolic Panel; Future; Expected date: 11/15/2023  -     CBC Auto Differential; Future; Expected date: 11/15/2023  -     HIV 1/2 Ag/Ab (4th Gen); Future; Expected date: 11/15/2023  -     Hepatitis C Antibody; Future; Expected date: 11/15/2023  -     TSH; Future; Expected date: 11/15/2023  -     LIPID PANEL; Future; Expected date: 11/15/2023    Other orders  -     triamterene-hydrochlorothiazide 37.5-25 mg (MAXZIDE-25) 37.5-25 mg per tablet; Take 1 tablet by mouth Daily.  Dispense: 90 tablet; Refill: 1  -     metoprolol tartrate (LOPRESSOR) 50 MG tablet; Take 1 tablet (50 mg total) by mouth 2 (two) times a  day.  Dispense: 180 tablet; Refill: 1               No follow-ups on file.

## 2023-11-15 NOTE — ASSESSMENT & PLAN NOTE
Will treat the lesion with ketoconazole for 2-3 weeks.  Patient will return to clinic for reevaluation and further management.  It could be an inflammatory issue rather than yeast.

## 2023-11-15 NOTE — ASSESSMENT & PLAN NOTE
Continue lopressor 50 mg BID  Continue combination HCTZ pill  Added losartan  Logs are provided  Patient will return to clinic in 2-3 weeks for reevaluation  Was told to bring logs and her home bp machine

## 2023-11-15 NOTE — ASSESSMENT & PLAN NOTE
Will refer the patient to get an Echo  Patient is unaware of the murmur  Denies Hx of IV drug use or rheumatic fever

## 2023-12-06 ENCOUNTER — OFFICE VISIT (OUTPATIENT)
Dept: FAMILY MEDICINE | Facility: CLINIC | Age: 48
End: 2023-12-06
Payer: COMMERCIAL

## 2023-12-06 VITALS
TEMPERATURE: 98 F | OXYGEN SATURATION: 95 % | HEIGHT: 66 IN | WEIGHT: 275.81 LBS | HEART RATE: 58 BPM | BODY MASS INDEX: 44.33 KG/M2 | SYSTOLIC BLOOD PRESSURE: 140 MMHG | RESPIRATION RATE: 17 BRPM | DIASTOLIC BLOOD PRESSURE: 97 MMHG

## 2023-12-06 DIAGNOSIS — I10 HYPERTENSION, UNSPECIFIED TYPE: ICD-10-CM

## 2023-12-06 DIAGNOSIS — L21.0 DANDRUFF IN ADULT: ICD-10-CM

## 2023-12-06 DIAGNOSIS — B35.3 TINEA PEDIS, UNSPECIFIED LATERALITY: ICD-10-CM

## 2023-12-06 DIAGNOSIS — G47.33 OSA (OBSTRUCTIVE SLEEP APNEA): ICD-10-CM

## 2023-12-06 DIAGNOSIS — Z23 FLU VACCINE NEED: Primary | ICD-10-CM

## 2023-12-06 PROCEDURE — 90471 FLU VACCINE (QUAD) GREATER THAN OR EQUAL TO 3YO PRESERVATIVE FREE IM: ICD-10-PCS | Mod: GC,,, | Performed by: SPECIALIST

## 2023-12-06 PROCEDURE — 1159F PR MEDICATION LIST DOCUMENTED IN MEDICAL RECORD: ICD-10-PCS | Mod: CPTII,,, | Performed by: SPECIALIST

## 2023-12-06 PROCEDURE — 3077F PR MOST RECENT SYSTOLIC BLOOD PRESSURE >= 140 MM HG: ICD-10-PCS | Mod: CPTII,,, | Performed by: SPECIALIST

## 2023-12-06 PROCEDURE — 3044F PR MOST RECENT HEMOGLOBIN A1C LEVEL <7.0%: ICD-10-PCS | Mod: CPTII,,, | Performed by: SPECIALIST

## 2023-12-06 PROCEDURE — 3008F PR BODY MASS INDEX (BMI) DOCUMENTED: ICD-10-PCS | Mod: CPTII,,, | Performed by: SPECIALIST

## 2023-12-06 PROCEDURE — 99213 OFFICE O/P EST LOW 20 MIN: CPT | Mod: 25,GC,, | Performed by: SPECIALIST

## 2023-12-06 PROCEDURE — 90686 FLU VACCINE (QUAD) GREATER THAN OR EQUAL TO 3YO PRESERVATIVE FREE IM: ICD-10-PCS | Mod: GC,,, | Performed by: SPECIALIST

## 2023-12-06 PROCEDURE — 1159F MED LIST DOCD IN RCRD: CPT | Mod: CPTII,,, | Performed by: SPECIALIST

## 2023-12-06 PROCEDURE — 3080F DIAST BP >= 90 MM HG: CPT | Mod: CPTII,,, | Performed by: SPECIALIST

## 2023-12-06 PROCEDURE — 90471 IMMUNIZATION ADMIN: CPT | Mod: GC,,, | Performed by: SPECIALIST

## 2023-12-06 PROCEDURE — 3008F BODY MASS INDEX DOCD: CPT | Mod: CPTII,,, | Performed by: SPECIALIST

## 2023-12-06 PROCEDURE — 4010F ACE/ARB THERAPY RXD/TAKEN: CPT | Mod: CPTII,,, | Performed by: SPECIALIST

## 2023-12-06 PROCEDURE — 90686 IIV4 VACC NO PRSV 0.5 ML IM: CPT | Mod: GC,,, | Performed by: SPECIALIST

## 2023-12-06 PROCEDURE — 3077F SYST BP >= 140 MM HG: CPT | Mod: CPTII,,, | Performed by: SPECIALIST

## 2023-12-06 PROCEDURE — 99213 PR OFFICE/OUTPT VISIT, EST, LEVL III, 20-29 MIN: ICD-10-PCS | Mod: 25,GC,, | Performed by: SPECIALIST

## 2023-12-06 PROCEDURE — 3080F PR MOST RECENT DIASTOLIC BLOOD PRESSURE >= 90 MM HG: ICD-10-PCS | Mod: CPTII,,, | Performed by: SPECIALIST

## 2023-12-06 PROCEDURE — 4010F PR ACE/ARB THEARPY RXD/TAKEN: ICD-10-PCS | Mod: CPTII,,, | Performed by: SPECIALIST

## 2023-12-06 PROCEDURE — 3044F HG A1C LEVEL LT 7.0%: CPT | Mod: CPTII,,, | Performed by: SPECIALIST

## 2023-12-06 RX ORDER — KETOCONAZOLE 20 MG/G
CREAM TOPICAL DAILY
Qty: 30 G | Refills: 1 | Status: SHIPPED | OUTPATIENT
Start: 2023-12-06 | End: 2024-01-05

## 2023-12-06 RX ORDER — KETOCONAZOLE 20 MG/ML
SHAMPOO, SUSPENSION TOPICAL
Qty: 120 ML | Refills: 2 | Status: SHIPPED | OUTPATIENT
Start: 2023-12-07

## 2023-12-06 RX ORDER — LOSARTAN POTASSIUM 50 MG/1
50 TABLET ORAL DAILY
Qty: 90 TABLET | Refills: 1 | Status: SHIPPED | OUTPATIENT
Start: 2023-12-06 | End: 2024-06-03

## 2023-12-06 NOTE — ASSESSMENT & PLAN NOTE
Almost resolved. Continue the medication until it is fully resolved. Once the tinea is gone stop the medication

## 2023-12-06 NOTE — PROGRESS NOTES
Subjective     Patient ID: Jonathan Rodriguez is a 48 y.o. female.    Chief Complaint: Follow-up (2 WEEK FOLLOW UP FOR HTN )    48 year old female with HTN presented for a 3 weeks follow up. Patient presented 3 weeks ago with HTN with systolic bp of 180-210 at home. Patient used to see a PCP who started her on 3 different BP meds but she lost contact with the PCP and has been out of the meds for a while. Last visit we continued all 3 meds which includes Losartan 25 mg, Triamterene-hctz combination pill and lopressor 50 mg BID. She was provided a log which she brought back. Systolic averages on 150-160 now.   Patient has been diagnosed with DENTON and had a CPAP which she does not have anymore.   Patient had a ring worm on the medial side of the left foot that we treated with ketoconazole topical that is almost resolved  She complaints of dandruff.     Review of Systems   All other systems reviewed and are negative.         Objective   Vitals:    12/06/23 1535   BP: (!) 140/97   Pulse:    Resp:    Temp:        Physical Exam  Vitals and nursing note reviewed.   Constitutional:       General: She is not in acute distress.     Appearance: Normal appearance. She is normal weight. She is not ill-appearing or toxic-appearing.   HENT:      Head: Normocephalic.      Right Ear: External ear normal.      Left Ear: External ear normal.      Nose: Nose normal.      Mouth/Throat:      Mouth: Mucous membranes are moist.   Eyes:      Conjunctiva/sclera: Conjunctivae normal.   Cardiovascular:      Rate and Rhythm: Normal rate and regular rhythm.      Pulses: Normal pulses.      Heart sounds: Murmur heard.   Pulmonary:      Effort: Pulmonary effort is normal. No respiratory distress.      Breath sounds: Normal breath sounds. No wheezing.   Abdominal:      General: Abdomen is flat.   Musculoskeletal:      Cervical back: Neck supple. No tenderness.        Feet:    Skin:     Findings: Lesion present.   Neurological:      Mental Status: She is  alert.          Assessment and Plan     1. Flu vaccine need  -     Influenza - Quadrivalent *Preferred* (6 months+) (PF)    2. Tinea pedis, unspecified laterality  Assessment & Plan:  Almost resolved. Continue the medication until it is fully resolved. Once the tinea is gone stop the medication    Orders:  -     ketoconazole (NIZORAL) 2 % cream; Apply topically once daily.  Dispense: 30 g; Refill: 1    3. Hypertension, unspecified type  Assessment & Plan:  Continue current regimen and increase losartan from 25 mg to 50  She will be back in a month with more logging.     Orders:  -     losartan (COZAAR) 50 MG tablet; Take 1 tablet (50 mg total) by mouth once daily.  Dispense: 90 tablet; Refill: 1    4. Dandruff in adult  -     ketoconazole (NIZORAL) 2 % shampoo; Apply topically twice a week.  Dispense: 120 mL; Refill: 2    5. DENTON (obstructive sleep apnea)  Assessment & Plan:  Has been diagnosed and had a CPAP but does not have the machine anymore. She will need a new CPAP     Orders:  -     Polysomnogram (CPAP will be added if patient meets diagnostic criteria.); Future        Flu vaccine need  -     Influenza - Quadrivalent *Preferred* (6 months+) (PF)    Tinea pedis, unspecified laterality  -     ketoconazole (NIZORAL) 2 % cream; Apply topically once daily.  Dispense: 30 g; Refill: 1    Hypertension, unspecified type  -     losartan (COZAAR) 50 MG tablet; Take 1 tablet (50 mg total) by mouth once daily.  Dispense: 90 tablet; Refill: 1    Dandruff in adult  -     ketoconazole (NIZORAL) 2 % shampoo; Apply topically twice a week.  Dispense: 120 mL; Refill: 2    DENTON (obstructive sleep apnea)  -     Polysomnogram (CPAP will be added if patient meets diagnostic criteria.); Future               No follow-ups on file.

## 2023-12-06 NOTE — ASSESSMENT & PLAN NOTE
Has been diagnosed and had a CPAP but does not have the machine anymore. She will need a new CPAP

## 2023-12-06 NOTE — ASSESSMENT & PLAN NOTE
Continue current regimen and increase losartan from 25 mg to 50  She will be back in a month with more logging.

## 2023-12-08 ENCOUNTER — HOSPITAL ENCOUNTER (OUTPATIENT)
Dept: CARDIOLOGY | Facility: HOSPITAL | Age: 48
Discharge: HOME | End: 2023-12-08
Attending: FAMILY MEDICINE
Payer: COMMERCIAL

## 2023-12-08 VITALS — BODY MASS INDEX: 44.2 KG/M2 | WEIGHT: 275 LBS | HEIGHT: 66 IN

## 2023-12-08 DIAGNOSIS — R01.1 SYSTOLIC MURMUR: ICD-10-CM

## 2023-12-08 PROCEDURE — 93306 ECHO (CUPID ONLY): ICD-10-PCS | Mod: 26,,, | Performed by: STUDENT IN AN ORGANIZED HEALTH CARE EDUCATION/TRAINING PROGRAM

## 2023-12-08 PROCEDURE — 93306 TTE W/DOPPLER COMPLETE: CPT

## 2023-12-08 PROCEDURE — 93306 TTE W/DOPPLER COMPLETE: CPT | Mod: 26,,, | Performed by: STUDENT IN AN ORGANIZED HEALTH CARE EDUCATION/TRAINING PROGRAM

## 2023-12-13 LAB
AORTIC ROOT ANNULUS: 2.81 CM
AORTIC VALVE CUSP SEPERATION: 2.09 CM
AV INDEX (PROSTH): 0.66
AV MEAN GRADIENT: 8 MMHG
AV PEAK GRADIENT: 15 MMHG
AV VALVE AREA BY VELOCITY RATIO: 1.84 CM²
AV VALVE AREA: 2.07 CM²
AV VELOCITY RATIO: 0.59
BSA FOR ECHO PROCEDURE: 2.41 M2
CV ECHO LV RWT: 0.6 CM
DOP CALC AO PEAK VEL: 1.93 M/S
DOP CALC AO VTI: 38.3 CM
DOP CALC LVOT AREA: 3.1 CM2
DOP CALC LVOT DIAMETER: 2 CM
DOP CALC LVOT PEAK VEL: 1.13 M/S
DOP CALC LVOT STROKE VOLUME: 79.13 CM3
DOP CALCLVOT PEAK VEL VTI: 25.2 CM
E WAVE DECELERATION TIME: 250.25 MSEC
E/A RATIO: 1.16
E/E' RATIO: 10.24 M/S
ECHO LV POSTERIOR WALL: 1.32 CM (ref 0.6–1.1)
FRACTIONAL SHORTENING: 40 % (ref 28–44)
INTERVENTRICULAR SEPTUM: 1.32 CM (ref 0.6–1.1)
IVC DIAMETER: 1.51 CM
LEFT ATRIUM VOLUME INDEX MOD: 25.6 ML/M2
LEFT ATRIUM VOLUME MOD: 58.66 CM3
LEFT INTERNAL DIMENSION IN SYSTOLE: 2.64 CM (ref 2.1–4)
LEFT VENTRICLE DIASTOLIC VOLUME INDEX: 38.52 ML/M2
LEFT VENTRICLE DIASTOLIC VOLUME: 88.21 ML
LEFT VENTRICLE MASS INDEX: 96 G/M2
LEFT VENTRICLE SYSTOLIC VOLUME INDEX: 11.2 ML/M2
LEFT VENTRICLE SYSTOLIC VOLUME: 25.62 ML
LEFT VENTRICULAR INTERNAL DIMENSION IN DIASTOLE: 4.41 CM (ref 3.5–6)
LEFT VENTRICULAR MASS: 220.78 G
LV LATERAL E/E' RATIO: 10.88 M/S
LV SEPTAL E/E' RATIO: 9.67 M/S
LVOT MG: 2.99 MMHG
LVOT MV: 0.82 CM/S
MV PEAK A VEL: 0.75 M/S
MV PEAK E VEL: 0.87 M/S
MV STENOSIS PRESSURE HALF TIME: 72.57 MS
MV VALVE AREA P 1/2 METHOD: 3.03 CM2
PISA MRMAX VEL: 1.14 M/S
PISA TR MAX VEL: 1.99 M/S
PV PEAK GRADIENT: 4 MMHG
PV PEAK VELOCITY: 0.95 M/S
RA PRESSURE ESTIMATED: 3 MMHG
RA VOL SYS: 50.06 ML
RIGHT ATRIAL AREA: 17.6 CM2
RIGHT VENTRICULAR LENGTH IN DIASTOLE (APICAL 4-CHAMBER VIEW): 8.02 CM
RV MID DIAMA: 2.7 CM
RV TB RVSP: 5 MMHG
TDI LATERAL: 0.08 M/S
TDI SEPTAL: 0.09 M/S
TDI: 0.09 M/S
TR MAX PG: 16 MMHG
TRICUSPID ANNULAR PLANE SYSTOLIC EXCURSION: 2.34 CM
TV REST PULMONARY ARTERY PRESSURE: 19 MMHG
Z-SCORE OF LEFT VENTRICULAR DIMENSION IN END DIASTOLE: -6.85
Z-SCORE OF LEFT VENTRICULAR DIMENSION IN END SYSTOLE: -5.48

## 2024-01-10 ENCOUNTER — OFFICE VISIT (OUTPATIENT)
Dept: FAMILY MEDICINE | Facility: CLINIC | Age: 49
End: 2024-01-10
Payer: COMMERCIAL

## 2024-01-10 VITALS
DIASTOLIC BLOOD PRESSURE: 90 MMHG | RESPIRATION RATE: 20 BRPM | HEIGHT: 66 IN | TEMPERATURE: 99 F | WEIGHT: 282 LBS | OXYGEN SATURATION: 96 % | BODY MASS INDEX: 45.32 KG/M2 | HEART RATE: 80 BPM | SYSTOLIC BLOOD PRESSURE: 180 MMHG

## 2024-01-10 DIAGNOSIS — B36.9 FUNGAL SKIN INFECTION: ICD-10-CM

## 2024-01-10 DIAGNOSIS — I10 HYPERTENSION, UNSPECIFIED TYPE: Primary | ICD-10-CM

## 2024-01-10 PROCEDURE — 3077F SYST BP >= 140 MM HG: CPT | Mod: CPTII,,, | Performed by: SPECIALIST

## 2024-01-10 PROCEDURE — 99213 OFFICE O/P EST LOW 20 MIN: CPT | Mod: GC,,, | Performed by: SPECIALIST

## 2024-01-10 PROCEDURE — 3080F DIAST BP >= 90 MM HG: CPT | Mod: CPTII,,, | Performed by: SPECIALIST

## 2024-01-10 PROCEDURE — 3008F BODY MASS INDEX DOCD: CPT | Mod: CPTII,,, | Performed by: SPECIALIST

## 2024-01-10 PROCEDURE — 1159F MED LIST DOCD IN RCRD: CPT | Mod: CPTII,,, | Performed by: SPECIALIST

## 2024-01-10 RX ORDER — PREGABALIN 75 MG/1
75 CAPSULE ORAL 2 TIMES DAILY
COMMUNITY
End: 2024-06-13 | Stop reason: SDUPTHER

## 2024-01-10 RX ORDER — AMLODIPINE BESYLATE 5 MG/1
5 TABLET ORAL DAILY
Qty: 30 TABLET | Refills: 5 | Status: SHIPPED | OUTPATIENT
Start: 2024-01-10 | End: 2024-06-13 | Stop reason: SDUPTHER

## 2024-02-02 PROBLEM — B36.9 FUNGAL SKIN INFECTION: Status: ACTIVE | Noted: 2024-02-02

## 2024-02-02 NOTE — PROGRESS NOTES
Subjective     Patient ID: Jonathan Rodriguez is a 48 y.o. female.    Chief Complaint: Follow-up and Hypertension (Room 1 f/u HTN and echocardiogram)    48 year old presents for a follow up regarding a rash she has. She had a rash on her ankle that looked like a chronic ring worm. She has been using ketoconazole for 6 weeks and it has improved significantly.  Patient has increase bp     Review of Systems   All other systems reviewed and are negative.         Objective     Physical Exam  Vitals and nursing note reviewed.   Constitutional:       General: She is not in acute distress.     Appearance: Normal appearance. She is normal weight. She is not ill-appearing or toxic-appearing.   HENT:      Head: Normocephalic.      Right Ear: External ear normal.      Left Ear: External ear normal.      Nose: Nose normal.      Mouth/Throat:      Mouth: Mucous membranes are moist.   Eyes:      Conjunctiva/sclera: Conjunctivae normal.   Cardiovascular:      Rate and Rhythm: Normal rate and regular rhythm.      Pulses: Normal pulses.      Heart sounds: Murmur heard.   Pulmonary:      Effort: Pulmonary effort is normal. No respiratory distress.      Breath sounds: Normal breath sounds. No wheezing.   Abdominal:      General: Abdomen is flat.   Musculoskeletal:      Cervical back: Neck supple. No tenderness.        Feet:    Skin:     Findings: Lesion present.   Neurological:      Mental Status: She is alert.          Assessment and Plan     1. Hypertension, unspecified type  Assessment & Plan:  Norvasc 5 mg      2. Fungal skin infection  Assessment & Plan:  Continue current treatment with ketoconazole and follow up in a month       Other orders  -     amLODIPine (NORVASC) 5 MG tablet; Take 1 tablet (5 mg total) by mouth once daily.  Dispense: 30 tablet; Refill: 5        Hypertension, unspecified type    Fungal skin infection    Other orders  -     amLODIPine (NORVASC) 5 MG tablet; Take 1 tablet (5 mg total) by mouth once daily.  Dispense: 30  tablet; Refill: 5               No follow-ups on file.

## 2024-02-14 ENCOUNTER — OFFICE VISIT (OUTPATIENT)
Dept: FAMILY MEDICINE | Facility: CLINIC | Age: 49
End: 2024-02-14
Payer: COMMERCIAL

## 2024-02-14 VITALS
BODY MASS INDEX: 45.72 KG/M2 | RESPIRATION RATE: 16 BRPM | TEMPERATURE: 98 F | HEIGHT: 66 IN | WEIGHT: 284.5 LBS | HEART RATE: 70 BPM | DIASTOLIC BLOOD PRESSURE: 60 MMHG | OXYGEN SATURATION: 98 % | SYSTOLIC BLOOD PRESSURE: 115 MMHG

## 2024-02-14 DIAGNOSIS — I10 HYPERTENSION, UNSPECIFIED TYPE: Primary | ICD-10-CM

## 2024-02-14 PROCEDURE — 3078F DIAST BP <80 MM HG: CPT | Mod: CPTII,,, | Performed by: FAMILY MEDICINE

## 2024-02-14 PROCEDURE — 3008F BODY MASS INDEX DOCD: CPT | Mod: CPTII,,, | Performed by: FAMILY MEDICINE

## 2024-02-14 PROCEDURE — 4010F ACE/ARB THERAPY RXD/TAKEN: CPT | Mod: CPTII,,, | Performed by: FAMILY MEDICINE

## 2024-02-14 PROCEDURE — 1159F MED LIST DOCD IN RCRD: CPT | Mod: CPTII,,, | Performed by: FAMILY MEDICINE

## 2024-02-14 PROCEDURE — 99212 OFFICE O/P EST SF 10 MIN: CPT | Mod: ,,, | Performed by: FAMILY MEDICINE

## 2024-02-14 PROCEDURE — 3074F SYST BP LT 130 MM HG: CPT | Mod: CPTII,,, | Performed by: FAMILY MEDICINE

## 2024-02-21 NOTE — PROGRESS NOTES
Subjective     Patient ID: Jonathan Rodriguez is a 48 y.o. female.    Chief Complaint: Hypertension    48 year old female with HTN presented to clinic for a follow up. Patient bp was 170s/110s in prior visits and we started her on Maxzide, Lopressor, COzAAR and norvas and finally bp seems to be under control. Her logs at home shows bp averaging 140s/90s. She was advised about lifestyle modifications. She is going to continue taking her meds and doing logs and bring them back in a month    Review of Systems   All other systems reviewed and are negative.         Objective     Physical Exam  Vitals and nursing note reviewed.   Constitutional:       General: She is not in acute distress.     Appearance: Normal appearance. She is normal weight. She is not ill-appearing or toxic-appearing.   HENT:      Head: Normocephalic.      Right Ear: External ear normal.      Left Ear: External ear normal.      Nose: Nose normal.      Mouth/Throat:      Mouth: Mucous membranes are moist.   Eyes:      Conjunctiva/sclera: Conjunctivae normal.   Cardiovascular:      Rate and Rhythm: Normal rate and regular rhythm.      Pulses: Normal pulses.      Heart sounds: Murmur heard.   Pulmonary:      Effort: Pulmonary effort is normal. No respiratory distress.      Breath sounds: Normal breath sounds. No wheezing.   Abdominal:      General: Abdomen is flat.   Musculoskeletal:      Cervical back: Neck supple. No tenderness.        Feet:    Skin:     Findings: Lesion present.   Neurological:      Mental Status: She is alert.          Assessment and Plan     1. Hypertension, unspecified type  Assessment & Plan:  Will come back to [facility] in a month with more logs for further evaluation  Continue the current regiment.           Hypertension, unspecified type               No follow-ups on file.

## 2024-02-21 NOTE — ASSESSMENT & PLAN NOTE
Will come back to [facility] in a month with more logs for further evaluation  Continue the current regiment.

## 2024-02-22 NOTE — PROGRESS NOTES
I have reviewed the notes, assessments, and/or procedures performed by Dr. Desai, I concur with his documentation of Jonathan Need.  Date of Service: 2/14/2024

## 2024-06-13 ENCOUNTER — OFFICE VISIT (OUTPATIENT)
Dept: FAMILY MEDICINE | Facility: CLINIC | Age: 49
End: 2024-06-13
Payer: COMMERCIAL

## 2024-06-13 VITALS
DIASTOLIC BLOOD PRESSURE: 80 MMHG | HEART RATE: 99 BPM | BODY MASS INDEX: 45.96 KG/M2 | OXYGEN SATURATION: 95 % | SYSTOLIC BLOOD PRESSURE: 120 MMHG | RESPIRATION RATE: 18 BRPM | HEIGHT: 66 IN | WEIGHT: 286 LBS

## 2024-06-13 DIAGNOSIS — I10 HYPERTENSION, UNSPECIFIED TYPE: ICD-10-CM

## 2024-06-13 DIAGNOSIS — M54.10 RADICULAR LOW BACK PAIN: ICD-10-CM

## 2024-06-13 DIAGNOSIS — Z71.82 EXERCISE COUNSELING: ICD-10-CM

## 2024-06-13 DIAGNOSIS — Z71.3 DIETARY COUNSELING: Primary | ICD-10-CM

## 2024-06-13 DIAGNOSIS — K21.9 GASTROESOPHAGEAL REFLUX DISEASE, UNSPECIFIED WHETHER ESOPHAGITIS PRESENT: ICD-10-CM

## 2024-06-13 PROCEDURE — 3074F SYST BP LT 130 MM HG: CPT | Mod: CPTII,,, | Performed by: FAMILY MEDICINE

## 2024-06-13 PROCEDURE — 3008F BODY MASS INDEX DOCD: CPT | Mod: CPTII,,, | Performed by: FAMILY MEDICINE

## 2024-06-13 PROCEDURE — 4010F ACE/ARB THERAPY RXD/TAKEN: CPT | Mod: CPTII,,, | Performed by: FAMILY MEDICINE

## 2024-06-13 PROCEDURE — 1159F MED LIST DOCD IN RCRD: CPT | Mod: CPTII,,, | Performed by: FAMILY MEDICINE

## 2024-06-13 PROCEDURE — 3079F DIAST BP 80-89 MM HG: CPT | Mod: CPTII,,, | Performed by: FAMILY MEDICINE

## 2024-06-13 PROCEDURE — 1160F RVW MEDS BY RX/DR IN RCRD: CPT | Mod: CPTII,,, | Performed by: FAMILY MEDICINE

## 2024-06-13 PROCEDURE — 99213 OFFICE O/P EST LOW 20 MIN: CPT | Mod: ,,, | Performed by: FAMILY MEDICINE

## 2024-06-13 RX ORDER — METOPROLOL TARTRATE 50 MG/1
50 TABLET ORAL 2 TIMES DAILY
Qty: 180 TABLET | Refills: 1 | Status: SHIPPED | OUTPATIENT
Start: 2024-06-13 | End: 2024-12-10

## 2024-06-13 RX ORDER — AMLODIPINE BESYLATE 5 MG/1
5 TABLET ORAL DAILY
Qty: 30 TABLET | Refills: 5 | Status: SHIPPED | OUTPATIENT
Start: 2024-06-13 | End: 2024-12-10

## 2024-06-13 RX ORDER — PANTOPRAZOLE SODIUM 40 MG/1
40 TABLET, DELAYED RELEASE ORAL DAILY
Qty: 90 TABLET | Refills: 1 | Status: SHIPPED | OUTPATIENT
Start: 2024-06-13 | End: 2024-12-10

## 2024-06-13 RX ORDER — PREGABALIN 75 MG/1
75 CAPSULE ORAL 2 TIMES DAILY
Qty: 60 CAPSULE | Refills: 5 | Status: SHIPPED | OUTPATIENT
Start: 2024-06-13 | End: 2024-06-18 | Stop reason: SDUPTHER

## 2024-06-13 RX ORDER — TRIAMTERENE/HYDROCHLOROTHIAZID 37.5-25 MG
1 TABLET ORAL DAILY
Qty: 90 TABLET | Refills: 1 | Status: SHIPPED | OUTPATIENT
Start: 2024-06-13 | End: 2024-12-10

## 2024-06-13 RX ORDER — LOSARTAN POTASSIUM 50 MG/1
50 TABLET ORAL DAILY
Qty: 90 TABLET | Refills: 1 | Status: SHIPPED | OUTPATIENT
Start: 2024-06-13 | End: 2024-12-10

## 2024-06-13 NOTE — PROGRESS NOTES
Subjective:       Patient ID: Jonathan Rodriguez is a 48 y.o. female.    Chief Complaint: Hypertension (Room 3 patient needs medicine refills, no c/o )    47 yo female comes to the clinic today for follow up and medication refill. Patient blood pressure has been under control with current regimen. States that lately has been under a lots of stress because she is taking care of her father in law that is very sick. Recommendations were given to lifestyle changes, specially nutrition, exercise and weight management, and print out material was given. Medication refill sent to the pharmacy.    HTN:   Amlodipine 5 mg once daily  Losartan 50 mg once daily  Lopressor (metoprolol) 50 mg, take 1 tablet BID  Maxzide (triamterene-hctz) 37.5-25 mg once daily    GERD:  Pantoprazole 40 mg daily    RADICULAR LOW BACK PAIN:  Pregabalin (LYRICA) 75 MG capsule BID          Current Outpatient Medications:     ketoconazole (NIZORAL) 2 % shampoo, Apply topically twice a week., Disp: 120 mL, Rfl: 2    amLODIPine (NORVASC) 5 MG tablet, Take 1 tablet (5 mg total) by mouth once daily., Disp: 30 tablet, Rfl: 5    losartan (COZAAR) 50 MG tablet, Take 1 tablet (50 mg total) by mouth once daily., Disp: 90 tablet, Rfl: 1    metoprolol tartrate (LOPRESSOR) 50 MG tablet, Take 1 tablet (50 mg total) by mouth 2 (two) times a day., Disp: 180 tablet, Rfl: 1    pantoprazole (PROTONIX) 40 MG tablet, Take 1 tablet (40 mg total) by mouth once daily., Disp: 90 tablet, Rfl: 1    pregabalin (LYRICA) 75 MG capsule, Take 1 capsule (75 mg total) by mouth 2 (two) times daily., Disp: 60 capsule, Rfl: 5    triamterene-hydrochlorothiazide 37.5-25 mg (MAXZIDE-25) 37.5-25 mg per tablet, Take 1 tablet by mouth Daily., Disp: 90 tablet, Rfl: 1    Review of patient's allergies indicates:   Allergen Reactions    Neurontin [gabapentin] Shortness Of Breath    Lisinopril Other (See Comments)     cough    Aspirin Nausea And Vomiting       Past Medical History:   Diagnosis Date     Anxiety disorder, unspecified     Asthma     GERD (gastroesophageal reflux disease)     Hypertension     Lumbar pain     Pseudoseizures     Unspecified osteoarthritis, unspecified site        Past Surgical History:   Procedure Laterality Date    bladder tack      COLONOSCOPY      HYSTERECTOMY      LAPAROSCOPIC CHOLECYSTECTOMY N/A 03/10/2023    Procedure: CHOLECYSTECTOMY, LAPAROSCOPIC;  Surgeon: Josue Ramirez MD;  Location: [facility], [place];  Service: General;  Laterality: N/A;       Family History   Problem Relation Name Age of Onset    Hypertension Mother      Diabetes Mother      Diabetes Father      Hypertension Sister      Diabetes Maternal Aunt      Cancer Paternal Aunt      Cancer Paternal Uncle      Heart disease Maternal Grandmother      Diabetes Maternal Grandfather      Heart disease Maternal Grandfather      Heart disease Paternal Grandmother      Heart disease Paternal Grandfather         Social History     Tobacco Use    Smoking status: Never    Smokeless tobacco: Never   Substance Use Topics    Alcohol use: Never    Drug use: Never       Review of Systems   Constitutional:  Negative for appetite change, fatigue and fever.   HENT:  Negative for ear pain.    Eyes:  Negative for visual disturbance.   Respiratory:  Negative for cough, chest tightness and shortness of breath.    Cardiovascular:  Negative for chest pain and leg swelling.   Gastrointestinal:  Negative for abdominal pain, diarrhea, nausea and vomiting.   Genitourinary:  Negative for difficulty urinating and flank pain.   Musculoskeletal:  Positive for back pain. Negative for gait problem.   Integumentary:  Negative for rash and wound.   Neurological:  Negative for dizziness, syncope, light-headedness and headaches.   Psychiatric/Behavioral:  Negative for agitation and confusion.            Objective:      Vitals:    06/13/24 1426   BP: 120/80   BP Location: Left arm   Patient Position: Sitting   BP Method: Medium (Automatic)   Pulse: 99   Resp: 18  "  SpO2: 95%   Weight: 129.7 kg (286 lb)   Height: 5' 6" (1.676 m)     Physical Exam  Vitals reviewed.   Constitutional:       General: She is not in acute distress.     Appearance: Normal appearance.   HENT:      Head: Normocephalic and atraumatic.      Right Ear: External ear normal.      Left Ear: External ear normal.      Nose: Nose normal. No congestion.      Mouth/Throat:      Mouth: Mucous membranes are moist.   Eyes:      Conjunctiva/sclera: Conjunctivae normal.   Cardiovascular:      Rate and Rhythm: Normal rate and regular rhythm.      Pulses: Normal pulses.      Heart sounds: Normal heart sounds.   Pulmonary:      Effort: Pulmonary effort is normal. No respiratory distress.      Breath sounds: Normal breath sounds.   Abdominal:      General: Bowel sounds are normal.      Tenderness: There is no abdominal tenderness.   Musculoskeletal:         General: No swelling. Normal range of motion.      Cervical back: Normal range of motion.   Skin:     General: Skin is warm.      Coloration: Skin is not jaundiced.   Neurological:      General: No focal deficit present.      Mental Status: She is alert and oriented to person, place, and time.   Psychiatric:         Mood and Affect: Mood normal.         Behavior: Behavior normal.         Thought Content: Thought content normal.           Lab Results   Component Value Date    WBC 10.52 11/15/2023    HGB 14.4 11/15/2023    HCT 43.8 11/15/2023     11/15/2023    CHOL 142 11/15/2023    TRIG 98 11/15/2023    HDL 47 11/15/2023    ALT 31 11/15/2023    AST 12 (L) 11/15/2023     11/15/2023    K 4.1 11/15/2023     11/15/2023    CREATININE 0.84 11/15/2023    BUN 15 11/15/2023    CO2 29 11/15/2023    TSH 1.760 11/15/2023    INR 0.87 (L) 11/22/2021    HGBA1C 5.3 11/15/2023      Assessment:       1. Dietary counseling    2. Hypertension, unspecified type    3. Gastroesophageal reflux disease, unspecified whether esophagitis present    4. Exercise counseling    5. " Radicular low back pain        Plan:         Problem List Items Addressed This Visit       Hypertension     Patient well control with current regimen.   Continue:  Amlodipine 5 mg once daily  Losartan 50 mg once daily  Lopressor (metoprolol) 50 mg, take 1 tablet BID  Maxzide (triamterene-hctz 37.5-25 mg once daily    - continue to monitor BP at home  - watch out for symptoms as headaches, dizziness, chest pain, palpitations, lightheadedness, or blurred vision  - manage a BP log and bring it to your next appointment  - maintain healthy life style with dietary restriction such as low salt and low sodium diet. Follow DASH diet.   - engage in physical exercise for at least 30 minutes a day/5 days a week  - take medications regularly          Relevant Medications    amLODIPine (NORVASC) 5 MG tablet    losartan (COZAAR) 50 MG tablet    metoprolol tartrate (LOPRESSOR) 50 MG tablet    triamterene-hydrochlorothiazide 37.5-25 mg (MAXZIDE-25) 37.5-25 mg per tablet    Radicular low back pain     Refill Lyrica.   Patient states that lately her back pain is present due to been taking care of her father in law that is sick, and has to move him around.         Dietary counseling - Primary     A Simple Weight Loss Program  Step 1  1. Eat only at mealtime.  2. Eat what you always eat, but one-fourth less.  3. Avoid snacks. Don't buy them. If you don't buy them, you can't eat them.  4. Avoid foods with high-caloric content like desserts (pie, cake, ice cream, cookies).  5. If you drink soft drinks, drink only the ones without sugar. If you don't like them, most people get used to them. Then they don't like the sugary ones (taste too sweet).  6. If you do well and lose 1-2 pounds per week, every two weeks reward yourself with your favorite dessert.  7. By doing the above you will develop good dietary habits that will stay with you for a lifetime.  Step 2  Once you have been successful with step 1 (losing 1-2 pounds per week for a  month), start a physical fitness program, by walking for 30 minutes five time per week.  Step 3  Once you are successful with steps 2 and step 2, start working on eating a healthy diet (see below).    USDA Guidelines for a Healthy Diet  An eating plan that helps manage your weight includes a variety of healthy foods. Add an array of colors to your plate and think of it as eating the rainbow. Dark, leafy greens, oranges, and tomatoes even fresh herbs--are loaded with vitamins, fiber, and minerals. Adding frozen peppers, broccoli, or onions to stews and omelets gives them a quick and convenient boost of color and nutrients.    According to the Dietary Guidelines for Americans 9286-8900, a healthy eating plan:   Emphasizes fruits, vegetables, whole grains, and fat-free or low fat milk and milk products   Includes a variety of protein foods such as seafood, lean meats and poultry, eggs, legumes (beans and peas), soy products, nuts, and seeds.   Is low in added sugars, sodium, saturated fats, trans fats, and cholesterol.   Stays within your daily calorie needs    USDA's MyPlate Plan can help you identify what and how much to eat from the different food groups while staying within your recommended calorie allowance. You can also download My Food Diary to help track your meals.    Fruit  Fresh, frozen, or canned fruits are great choices. Try fruits beyond apples and bananas such as ness, pineapple or kiwi fruit. When fresh fruit is not in season, try a frozen, canned, or dried variety. Be aware that dried and canned fruit may contain added sugars or syrups. Choose canned varieties of fruit packed in water or in its own juice.    Vegetables  Add variety to grilled or steamed vegetables with an herb such as rosemary. You can also sauté (panfry) vegetables in a non-stick pan with a small amount of cooking spray.  Or try frozen or canned vegetables for a quick side dish just microwave and serve.  Look for canned vegetables  without added salt, butter, or cream sauces. For variety, try a new vegetable each week.    Calcium-rich foods  In addition to fat-free and low fat milk, consider low-fat and fat-free yogurts without added sugars. These come in a variety of flavors and can be a great dessert substitute.  Meats  If your favorite recipe calls for frying fish or breaded chicken, try healthier variations by baking or grilling. Maybe even try dry beans in place of meats. Ask friends and search the internet and magazines for recipes with fewer calories - you might be surprised to find you have a new favorite dish.    Comfort Foods  Healthy eating is all about balance. You can enjoy your favorite foods, even if they are high in calories, fat or added sugars. The key is eating them only once in a while and balancing them with healthier foods and more physical activity.  Some general tips for comfort foods:   Eat them less often. If you normally eat these foods every day, cut back to once a week or once a month.   Eat smaller amounts. If your favorite higher-calorie food is a chocolate bar, have a smaller size or only half a bar.   Try a lower-calorie version. Use lower-calorie ingredients or prepare food differently. For example, if your macaroni and cheese recipe includes whole milk, butter, and full-fat cheese, try remaking it with non-fat milk, less butter, low-fat cheese, fresh spinach and tomatoes. Just remember to not increase your portion size.          Exercise counseling     Guidelines  The recommendation for adults 18-64 years old is to do at least 150-300 minutes of moderate intensity or  minutes of vigorous intensity activity weekly along with two or more days weekly of strength training. The more physical activity, the more benefit, but any amount of exercise is better than none. Working with a certified exercise specialist such as a kinesiologist, exercise physiologist, physical therapist, or   is the safest and most reliable way to begin an exercise program.    Moderate Activity   Brisk walking    Heavy cleaning (washing windows, vacuuming, mopping)    Mowing lawn (power mower)    Light bicycling    Recreational badminton    Tennis doubles     Vigorous Activity   Hiking    Jogging    Shoveling    Carrying heavy loads    Bicycling fast    Basketball game    Soccer game    Tennis singles    Activity Types   Aerobic or endurance activities include running, swimming, biking, hiking, playing sports, dancing and brisk walking.    Strength or resistance activities include weight lifting, pushing a wheelchair/ stroller, kettlebells and body weight exercises such as squats, lunges, pushups, sit-ups etc.    Flexibility activities include stretching and some forms of yoga.    Balance activities include maddie chi, qi gong and some forms of yoga.    Activity Goals  Setting a goal is a great way to get started with physical activity. Its easier to achieve positive goals. An example of a positive activity goal is, I will walk with a friend or family member for at least 20 minutes after dinner, every weekday for the next two months.            Other Visit Diagnoses       Gastroesophageal reflux disease, unspecified whether esophagitis present        Relevant Medications    pantoprazole (PROTONIX) 40 MG tablet              Follow up in about 6 months (around 12/13/2024).    Kj Kramer MD     Instructed patient that if symptoms fail to improve or worsen patient should seek immediate medical attention or report to the nearest emergency department. Patient expressed verbal agreement and understanding to this plan of care.

## 2024-06-13 NOTE — ASSESSMENT & PLAN NOTE
Patient well control with current regimen.   Continue:  Amlodipine 5 mg once daily  Losartan 50 mg once daily  Lopressor (metoprolol) 50 mg, take 1 tablet BID  Maxzide (triamterene-hctz 37.5-25 mg once daily    - continue to monitor BP at home  - watch out for symptoms as headaches, dizziness, chest pain, palpitations, lightheadedness, or blurred vision  - manage a BP log and bring it to your next appointment  - maintain healthy life style with dietary restriction such as low salt and low sodium diet. Follow DASH diet.   - engage in physical exercise for at least 30 minutes a day/5 days a week  - take medications regularly

## 2024-06-13 NOTE — ASSESSMENT & PLAN NOTE
A Simple Weight Loss Program  Step 1  1. Eat only at mealtime.  2. Eat what you always eat, but one-fourth less.  3. Avoid snacks. Don't buy them. If you don't buy them, you can't eat them.  4. Avoid foods with high-caloric content like desserts (pie, cake, ice cream, cookies).  5. If you drink soft drinks, drink only the ones without sugar. If you don't like them, most people get used to them. Then they don't like the sugary ones (taste too sweet).  6. If you do well and lose 1-2 pounds per week, every two weeks reward yourself with your favorite dessert.  7. By doing the above you will develop good dietary habits that will stay with you for a lifetime.  Step 2  Once you have been successful with step 1 (losing 1-2 pounds per week for a month), start a physical fitness program, by walking for 30 minutes five time per week.  Step 3  Once you are successful with steps 2 and step 2, start working on eating a healthy diet (see below).    USDA Guidelines for a Healthy Diet  An eating plan that helps manage your weight includes a variety of healthy foods. Add an array of colors to your plate and think of it as eating the rainbow. Dark, leafy greens, oranges, and tomatoes even fresh herbs--are loaded with vitamins, fiber, and minerals. Adding frozen peppers, broccoli, or onions to stews and omelets gives them a quick and convenient boost of color and nutrients.    According to the Dietary Guidelines for Americans 6911-0303, a healthy eating plan:   Emphasizes fruits, vegetables, whole grains, and fat-free or low fat milk and milk products   Includes a variety of protein foods such as seafood, lean meats and poultry, eggs, legumes (beans and peas), soy products, nuts, and seeds.   Is low in added sugars, sodium, saturated fats, trans fats, and cholesterol.   Stays within your daily calorie needs    USDA's MyPlate Plan can help you identify what and how much to eat from the different food groups while staying within your  recommended calorie allowance. You can also download My Food Diary to help track your meals.    Fruit  Fresh, frozen, or canned fruits are great choices. Try fruits beyond apples and bananas such as ness, pineapple or kiwi fruit. When fresh fruit is not in season, try a frozen, canned, or dried variety. Be aware that dried and canned fruit may contain added sugars or syrups. Choose canned varieties of fruit packed in water or in its own juice.    Vegetables  Add variety to grilled or steamed vegetables with an herb such as rosemary. You can also sauté (panfry) vegetables in a non-stick pan with a small amount of cooking spray.  Or try frozen or canned vegetables for a quick side dish just microwave and serve.  Look for canned vegetables without added salt, butter, or cream sauces. For variety, try a new vegetable each week.    Calcium-rich foods  In addition to fat-free and low fat milk, consider low-fat and fat-free yogurts without added sugars. These come in a variety of flavors and can be a great dessert substitute.  Meats  If your favorite recipe calls for frying fish or breaded chicken, try healthier variations by baking or grilling. Maybe even try dry beans in place of meats. Ask friends and search the internet and magazines for recipes with fewer calories - you might be surprised to find you have a new favorite dish.    Comfort Foods  Healthy eating is all about balance. You can enjoy your favorite foods, even if they are high in calories, fat or added sugars. The key is eating them only once in a while and balancing them with healthier foods and more physical activity.  Some general tips for comfort foods:   Eat them less often. If you normally eat these foods every day, cut back to once a week or once a month.   Eat smaller amounts. If your favorite higher-calorie food is a chocolate bar, have a smaller size or only half a bar.   Try a lower-calorie version. Use lower-calorie ingredients or prepare food  differently. For example, if your macaroni and cheese recipe includes whole milk, butter, and full-fat cheese, try remaking it with non-fat milk, less butter, low-fat cheese, fresh spinach and tomatoes. Just remember to not increase your portion size.

## 2024-06-13 NOTE — ASSESSMENT & PLAN NOTE
Guidelines  The recommendation for adults 18-64 years old is to do at least 150-300 minutes of moderate intensity or  minutes of vigorous intensity activity weekly along with two or more days weekly of strength training. The more physical activity, the more benefit, but any amount of exercise is better than none. Working with a certified exercise specialist such as a kinesiologist, exercise physiologist, physical therapist, or  is the safest and most reliable way to begin an exercise program.    Moderate Activity   Brisk walking    Heavy cleaning (washing windows, vacuuming, mopping)    Mowing lawn (power mower)    Light bicycling    Recreational badminton    Tennis doubles     Vigorous Activity   Hiking    Jogging    Shoveling    Carrying heavy loads    Bicycling fast    Basketball game    Soccer game    Tennis singles    Activity Types   Aerobic or endurance activities include running, swimming, biking, hiking, playing sports, dancing and brisk walking.    Strength or resistance activities include weight lifting, pushing a wheelchair/ stroller, kettlebells and body weight exercises such as squats, lunges, pushups, sit-ups etc.    Flexibility activities include stretching and some forms of yoga.    Balance activities include maddie chi, qi gong and some forms of yoga.    Activity Goals  Setting a goal is a great way to get started with physical activity. Its easier to achieve positive goals. An example of a positive activity goal is, I will walk with a friend or family member for at least 20 minutes after dinner, every weekday for the next two months.

## 2024-06-13 NOTE — ASSESSMENT & PLAN NOTE
Refill Lyrica.   Patient states that lately her back pain is present due to been taking care of her father in law that is sick, and has to move him around.

## 2024-06-18 RX ORDER — PREGABALIN 75 MG/1
75 CAPSULE ORAL 2 TIMES DAILY
Qty: 60 CAPSULE | Refills: 5 | Status: SHIPPED | OUTPATIENT
Start: 2024-06-18 | End: 2024-12-15

## 2024-06-18 NOTE — TELEPHONE ENCOUNTER
----- Message from Clinton Wagoner sent at 6/18/2024  3:55 PM CDT -----  Patient states that the pharmacy didn't get her LYRICA. Ekta Egan.

## 2024-08-01 RX ORDER — PREGABALIN 75 MG/1
75 CAPSULE ORAL 2 TIMES DAILY
Qty: 60 CAPSULE | Refills: 5 | Status: SHIPPED | OUTPATIENT
Start: 2024-08-01 | End: 2025-01-28

## 2024-09-09 ENCOUNTER — OFFICE VISIT (OUTPATIENT)
Dept: FAMILY MEDICINE | Facility: CLINIC | Age: 49
End: 2024-09-09
Payer: COMMERCIAL

## 2024-09-09 ENCOUNTER — HOSPITAL ENCOUNTER (OUTPATIENT)
Dept: RADIOLOGY | Facility: HOSPITAL | Age: 49
Discharge: HOME | End: 2024-09-09
Payer: COMMERCIAL

## 2024-09-09 VITALS
HEIGHT: 66 IN | TEMPERATURE: 98 F | DIASTOLIC BLOOD PRESSURE: 81 MMHG | SYSTOLIC BLOOD PRESSURE: 137 MMHG | WEIGHT: 275 LBS | HEART RATE: 53 BPM | RESPIRATION RATE: 18 BRPM | OXYGEN SATURATION: 97 % | BODY MASS INDEX: 44.2 KG/M2

## 2024-09-09 DIAGNOSIS — R20.0 NUMBNESS AND TINGLING OF RIGHT ARM: ICD-10-CM

## 2024-09-09 DIAGNOSIS — M25.511 ACUTE PAIN OF RIGHT SHOULDER: ICD-10-CM

## 2024-09-09 DIAGNOSIS — M54.10 RADICULAR LOW BACK PAIN: ICD-10-CM

## 2024-09-09 DIAGNOSIS — M25.511 ACUTE PAIN OF RIGHT SHOULDER: Primary | ICD-10-CM

## 2024-09-09 DIAGNOSIS — R20.2 NUMBNESS AND TINGLING OF RIGHT ARM: ICD-10-CM

## 2024-09-09 DIAGNOSIS — I10 HYPERTENSION, UNSPECIFIED TYPE: ICD-10-CM

## 2024-09-09 LAB
ALBUMIN SERPL BCP-MCNC: 3.6 G/DL (ref 3.5–5)
ALBUMIN/GLOB SERPL: 1.1 {RATIO}
ALP SERPL-CCNC: 71 U/L (ref 39–100)
ALT SERPL W P-5'-P-CCNC: 28 U/L (ref 13–56)
ANION GAP SERPL CALCULATED.3IONS-SCNC: 10 MMOL/L (ref 7–16)
AST SERPL W P-5'-P-CCNC: 11 U/L (ref 15–37)
BASOPHILS # BLD AUTO: 0.08 K/UL (ref 0–0.2)
BASOPHILS NFR BLD AUTO: 1 % (ref 0–1)
BILIRUB SERPL-MCNC: 0.7 MG/DL (ref ?–1.2)
BUN SERPL-MCNC: 15 MG/DL (ref 7–18)
BUN/CREAT SERPL: 16 (ref 6–20)
CALCIUM SERPL-MCNC: 9.4 MG/DL (ref 8.5–10.1)
CHLORIDE SERPL-SCNC: 106 MMOL/L (ref 98–107)
CO2 SERPL-SCNC: 29 MMOL/L (ref 21–32)
CREAT SERPL-MCNC: 0.96 MG/DL (ref 0.55–1.02)
DIFFERENTIAL METHOD BLD: ABNORMAL
EGFR (NO RACE VARIABLE) (RUSH/TITUS): 74 ML/MIN/1.73M2
EOSINOPHIL # BLD AUTO: 0.16 K/UL (ref 0–0.5)
EOSINOPHIL NFR BLD AUTO: 2.1 % (ref 1–4)
ERYTHROCYTE [DISTWIDTH] IN BLOOD BY AUTOMATED COUNT: 13.3 % (ref 11.5–14.5)
GLOBULIN SER-MCNC: 3.4 G/DL (ref 2–4)
GLUCOSE SERPL-MCNC: 102 MG/DL (ref 74–106)
HCT VFR BLD AUTO: 44.7 % (ref 38–47)
HGB BLD-MCNC: 14.4 G/DL (ref 12–16)
IMM GRANULOCYTES # BLD AUTO: 0.02 K/UL (ref 0–0.04)
IMM GRANULOCYTES NFR BLD: 0.3 % (ref 0–0.4)
LYMPHOCYTES # BLD AUTO: 2.55 K/UL (ref 1–4.8)
LYMPHOCYTES NFR BLD AUTO: 33.3 % (ref 27–41)
MAGNESIUM SERPL-MCNC: 2.2 MG/DL (ref 1.7–2.3)
MCH RBC QN AUTO: 28.2 PG (ref 27–31)
MCHC RBC AUTO-ENTMCNC: 32.2 G/DL (ref 32–36)
MCV RBC AUTO: 87.6 FL (ref 80–96)
MONOCYTES # BLD AUTO: 0.75 K/UL (ref 0–0.8)
MONOCYTES NFR BLD AUTO: 9.8 % (ref 2–6)
MPC BLD CALC-MCNC: 11.2 FL (ref 9.4–12.4)
NEUTROPHILS # BLD AUTO: 4.1 K/UL (ref 1.8–7.7)
NEUTROPHILS NFR BLD AUTO: 53.5 % (ref 53–65)
NRBC # BLD AUTO: 0 X10E3/UL
NRBC, AUTO (.00): 0 %
PLATELET # BLD AUTO: 386 K/UL (ref 150–400)
POTASSIUM SERPL-SCNC: 4 MMOL/L (ref 3.5–5.1)
PROT SERPL-MCNC: 7 G/DL (ref 6.4–8.2)
RBC # BLD AUTO: 5.1 M/UL (ref 4.2–5.4)
SODIUM SERPL-SCNC: 141 MMOL/L (ref 136–145)
TSH SERPL DL<=0.005 MIU/L-ACNC: 1.68 UIU/ML (ref 0.36–3.74)
WBC # BLD AUTO: 7.66 K/UL (ref 4.5–11)

## 2024-09-09 PROCEDURE — 3075F SYST BP GE 130 - 139MM HG: CPT | Mod: CPTII,,, | Performed by: FAMILY MEDICINE

## 2024-09-09 PROCEDURE — 83735 ASSAY OF MAGNESIUM: CPT | Mod: ,,, | Performed by: CLINICAL MEDICAL LABORATORY

## 2024-09-09 PROCEDURE — 3008F BODY MASS INDEX DOCD: CPT | Mod: CPTII,,, | Performed by: FAMILY MEDICINE

## 2024-09-09 PROCEDURE — 73030 X-RAY EXAM OF SHOULDER: CPT | Mod: TC,RT

## 2024-09-09 PROCEDURE — 73030 X-RAY EXAM OF SHOULDER: CPT | Mod: 26,RT,, | Performed by: RADIOLOGY

## 2024-09-09 PROCEDURE — 3079F DIAST BP 80-89 MM HG: CPT | Mod: CPTII,,, | Performed by: FAMILY MEDICINE

## 2024-09-09 PROCEDURE — 99213 OFFICE O/P EST LOW 20 MIN: CPT | Mod: GE,,, | Performed by: FAMILY MEDICINE

## 2024-09-09 PROCEDURE — 1159F MED LIST DOCD IN RCRD: CPT | Mod: CPTII,,, | Performed by: FAMILY MEDICINE

## 2024-09-09 PROCEDURE — 80050 GENERAL HEALTH PANEL: CPT | Mod: ,,, | Performed by: CLINICAL MEDICAL LABORATORY

## 2024-09-09 PROCEDURE — 72040 X-RAY EXAM NECK SPINE 2-3 VW: CPT | Mod: TC

## 2024-09-09 PROCEDURE — 72040 X-RAY EXAM NECK SPINE 2-3 VW: CPT | Mod: 26,,, | Performed by: RADIOLOGY

## 2024-09-09 PROCEDURE — 4010F ACE/ARB THERAPY RXD/TAKEN: CPT | Mod: CPTII,,, | Performed by: FAMILY MEDICINE

## 2024-09-09 RX ORDER — PREGABALIN 75 MG/1
75 CAPSULE ORAL 2 TIMES DAILY
Qty: 60 CAPSULE | Refills: 5 | Status: SHIPPED | OUTPATIENT
Start: 2024-09-09 | End: 2024-09-09 | Stop reason: SDUPTHER

## 2024-09-09 RX ORDER — PREGABALIN 75 MG/1
75 CAPSULE ORAL 2 TIMES DAILY
Qty: 60 CAPSULE | Refills: 0 | Status: SHIPPED | OUTPATIENT
Start: 2024-09-09 | End: 2025-03-08

## 2024-09-09 NOTE — ASSESSMENT & PLAN NOTE
Given limited rom will get xray to rule out any fractures, will have her follow up in clinic in two weeks. Will also look at cervical xray as well to rule out and radicular issues. NIH stroke scale of 1, unlikely for a stroke a this time given numbness and tingling.

## 2024-09-09 NOTE — ASSESSMENT & PLAN NOTE
Well controlled, overdue for chemistries will get today and could be cause of numbness and tingling. Will get CMP follow up in 2 weeks.

## 2024-09-09 NOTE — PROGRESS NOTES
Subjective:       Patient ID: Jonathan Rodriguez is a 48 y.o. female.    Chief Complaint: JOINT PAIN (States that she had sharp pain to her right side, that contoured her hand and bent it inward, and reports previous numbness before it occurred./States that when she awoke this morning she had numbness to her complete right side but she was still able to walk, she experiences a tingling and warm sensation. And reports that she has back pain.)    Patient is a 47 yo M who present to the clinic with complaints of right arm pain and numbness that started last Thursday, she reports while at work she developed some contracture of her right arm. She reports it went away after three minutes and has not had an issue since, she reports some right shoulder pain and pain with full rom.       Current Outpatient Medications:     amLODIPine (NORVASC) 5 MG tablet, Take 1 tablet (5 mg total) by mouth once daily., Disp: 30 tablet, Rfl: 5    ketoconazole (NIZORAL) 2 % shampoo, Apply topically twice a week., Disp: 120 mL, Rfl: 2    losartan (COZAAR) 50 MG tablet, Take 1 tablet (50 mg total) by mouth once daily., Disp: 90 tablet, Rfl: 1    metoprolol tartrate (LOPRESSOR) 50 MG tablet, Take 1 tablet (50 mg total) by mouth 2 (two) times a day., Disp: 180 tablet, Rfl: 1    pantoprazole (PROTONIX) 40 MG tablet, Take 1 tablet (40 mg total) by mouth once daily., Disp: 90 tablet, Rfl: 1    triamterene-hydrochlorothiazide 37.5-25 mg (MAXZIDE-25) 37.5-25 mg per tablet, Take 1 tablet by mouth Daily., Disp: 90 tablet, Rfl: 1    pregabalin (LYRICA) 75 MG capsule, Take 1 capsule (75 mg total) by mouth 2 (two) times daily., Disp: 60 capsule, Rfl: 0    Review of patient's allergies indicates:   Allergen Reactions    Neurontin [gabapentin] Shortness Of Breath    Lisinopril Other (See Comments)     cough    Aspirin Nausea And Vomiting       Past Medical History:   Diagnosis Date    Anxiety disorder, unspecified     Asthma     GERD (gastroesophageal reflux  disease)     Hypertension     Lumbar pain     Pseudoseizures     Unspecified osteoarthritis, unspecified site        Past Surgical History:   Procedure Laterality Date    bladder tack      COLONOSCOPY      HYSTERECTOMY      LAPAROSCOPIC CHOLECYSTECTOMY N/A 03/10/2023    Procedure: CHOLECYSTECTOMY, LAPAROSCOPIC;  Surgeon: Josue Ramirez MD;  Location: [facility];  Service: General;  Laterality: N/A;       Family History   Problem Relation Name Age of Onset    Hypertension Mother      Diabetes Mother      Diabetes Father      Hypertension Sister      Diabetes Maternal Aunt      Cancer Paternal Aunt      Cancer Paternal Uncle      Heart disease Maternal Grandmother      Diabetes Maternal Grandfather      Heart disease Maternal Grandfather      Heart disease Paternal Grandmother      Heart disease Paternal Grandfather         Social History     Tobacco Use    Smoking status: Never    Smokeless tobacco: Never   Substance Use Topics    Alcohol use: Never    Drug use: Never       Review of Systems   Constitutional:  Negative for fatigue and fever.   Musculoskeletal:  Positive for arthralgias, leg pain and myalgias. Negative for neck pain and neck stiffness.   Neurological:  Positive for weakness and numbness.       Current Medications:   Medication List with Changes/Refills   Current Medications    AMLODIPINE (NORVASC) 5 MG TABLET    Take 1 tablet (5 mg total) by mouth once daily.       Start Date: 6/13/2024 End Date: 12/10/2024    KETOCONAZOLE (NIZORAL) 2 % SHAMPOO    Apply topically twice a week.       Start Date: 12/7/2023 End Date: --    LOSARTAN (COZAAR) 50 MG TABLET    Take 1 tablet (50 mg total) by mouth once daily.       Start Date: 6/13/2024 End Date: 12/10/2024    METOPROLOL TARTRATE (LOPRESSOR) 50 MG TABLET    Take 1 tablet (50 mg total) by mouth 2 (two) times a day.       Start Date: 6/13/2024 End Date: 12/10/2024    PANTOPRAZOLE (PROTONIX) 40 MG TABLET    Take 1 tablet (40 mg total) by mouth once daily.        "Start Date: 6/13/2024 End Date: 12/10/2024    TRIAMTERENE-HYDROCHLOROTHIAZIDE 37.5-25 MG (MAXZIDE-25) 37.5-25 MG PER TABLET    Take 1 tablet by mouth Daily.       Start Date: 6/13/2024 End Date: 12/10/2024   Changed and/or Refilled Medications    Modified Medication Previous Medication    PREGABALIN (LYRICA) 75 MG CAPSULE pregabalin (LYRICA) 75 MG capsule       Take 1 capsule (75 mg total) by mouth 2 (two) times daily.    Take 1 capsule (75 mg total) by mouth 2 (two) times daily.       Start Date: 9/9/2024  End Date: 3/8/2025    Start Date: 8/1/2024  End Date: 9/9/2024            Objective:        Vitals:    09/09/24 0838 09/09/24 0842   BP: (!) 148/85 137/81   BP Location: Left arm Left arm   Patient Position: Sitting Sitting   BP Method: Large (Automatic)    Pulse: (!) 53    Resp: 18    Temp: 97.7 °F (36.5 °C)    TempSrc: Oral    SpO2: 97%    Weight: 124.7 kg (275 lb)    Height: 5' 6" (1.676 m)        Physical Exam  Vitals and nursing note reviewed.   Constitutional:       Appearance: Normal appearance. She is obese.   HENT:      Head: Normocephalic.      Right Ear: External ear normal.      Left Ear: External ear normal.      Nose: Nose normal.      Mouth/Throat:      Pharynx: Oropharynx is clear.   Eyes:      Extraocular Movements: Extraocular movements intact.      Conjunctiva/sclera: Conjunctivae normal.      Pupils: Pupils are equal, round, and reactive to light.   Cardiovascular:      Rate and Rhythm: Normal rate and regular rhythm.      Pulses: Normal pulses.      Heart sounds: Normal heart sounds.   Pulmonary:      Effort: Pulmonary effort is normal.      Breath sounds: Normal breath sounds.   Abdominal:      General: Bowel sounds are normal.      Palpations: Abdomen is soft.   Musculoskeletal:      Right shoulder: Tenderness present. Decreased range of motion.   Skin:     General: Skin is warm.   Neurological:      Mental Status: She is alert and oriented to person, place, and time.      Sensory: " Sensation is intact.      Motor: Motor function is intact.      Coordination: Coordination is intact.      Gait: Gait is intact.   Psychiatric:         Mood and Affect: Mood normal.         Behavior: Behavior normal.         Thought Content: Thought content normal.         Judgment: Judgment normal.           Lab Results   Component Value Date    WBC 10.52 11/15/2023    HGB 14.4 11/15/2023    HCT 43.8 11/15/2023     11/15/2023    CHOL 142 11/15/2023    TRIG 98 11/15/2023    HDL 47 11/15/2023    ALT 31 11/15/2023    AST 12 (L) 11/15/2023     11/15/2023    K 4.1 11/15/2023     11/15/2023    CREATININE 0.84 11/15/2023    BUN 15 11/15/2023    CO2 29 11/15/2023    TSH 1.760 11/15/2023    INR 0.87 (L) 11/22/2021    HGBA1C 5.3 11/15/2023      Assessment:       1. Acute pain of right shoulder    2. Hypertension, unspecified type    3. Numbness and tingling of right arm    4. Radicular low back pain        Plan:         Problem List Items Addressed This Visit          Cardiac/Vascular    Hypertension     Well controlled, overdue for chemistries will get today and could be cause of numbness and tingling. Will get CMP follow up in 2 weeks.          Relevant Orders    Comprehensive Metabolic Panel    Magnesium    CBC Auto Differential       Orthopedic    Radicular low back pain     Refill lyrica, recommend follow up in 2 weeks with Dr. Kramer.          Relevant Medications    pregabalin (LYRICA) 75 MG capsule    Acute pain of right shoulder - Primary     Given limited rom will get xray to rule out any fractures, will have her follow up in clinic in two weeks. Will also look at cervical xray as well to rule out and radicular issues. NIH stroke scale of 1, unlikely for a stroke a this time given numbness and tingling.          Relevant Orders    X-Ray Cervical Spine 2 or 3 Views (Completed)    X-ray Shoulder 2 or More Views Right (Completed)     Other Visit Diagnoses       Numbness and tingling of right arm         Relevant Orders    TSH              Follow up in about 2 weeks (around 9/23/2024).    Sacha Byrd DO     Instructed patient that if symptoms fail to improve or worsen patient should seek immediate medical attention or report to the nearest emergency department. Patient expressed verbal agreement and understanding to this plan of care.

## 2024-09-16 ENCOUNTER — OFFICE VISIT (OUTPATIENT)
Dept: FAMILY MEDICINE | Facility: CLINIC | Age: 49
End: 2024-09-16
Payer: COMMERCIAL

## 2024-09-16 VITALS
HEIGHT: 66 IN | BODY MASS INDEX: 44.65 KG/M2 | DIASTOLIC BLOOD PRESSURE: 79 MMHG | RESPIRATION RATE: 18 BRPM | WEIGHT: 277.81 LBS | TEMPERATURE: 99 F | HEART RATE: 84 BPM | SYSTOLIC BLOOD PRESSURE: 120 MMHG | OXYGEN SATURATION: 96 %

## 2024-09-16 DIAGNOSIS — M25.511 ACUTE PAIN OF RIGHT SHOULDER: Primary | ICD-10-CM

## 2024-09-16 DIAGNOSIS — M54.10 RADICULAR LOW BACK PAIN: ICD-10-CM

## 2024-09-16 PROCEDURE — 99213 OFFICE O/P EST LOW 20 MIN: CPT | Mod: GC,,, | Performed by: FAMILY MEDICINE

## 2024-09-16 PROCEDURE — 4010F ACE/ARB THERAPY RXD/TAKEN: CPT | Mod: CPTII,,, | Performed by: FAMILY MEDICINE

## 2024-09-16 PROCEDURE — 3074F SYST BP LT 130 MM HG: CPT | Mod: CPTII,,, | Performed by: FAMILY MEDICINE

## 2024-09-16 PROCEDURE — 3078F DIAST BP <80 MM HG: CPT | Mod: CPTII,,, | Performed by: FAMILY MEDICINE

## 2024-09-16 PROCEDURE — 3008F BODY MASS INDEX DOCD: CPT | Mod: CPTII,,, | Performed by: FAMILY MEDICINE

## 2024-09-16 PROCEDURE — 1159F MED LIST DOCD IN RCRD: CPT | Mod: CPTII,,, | Performed by: FAMILY MEDICINE

## 2024-09-16 NOTE — ASSESSMENT & PLAN NOTE
Imaging at last visit did not show any acute issues some degenerative changes in the right shoulder AC joint space. Will refer to orthopedics for further evaluation.

## 2024-09-16 NOTE — PROGRESS NOTES
Subjective:       Patient ID: Jonathan Rodriguez is a 48 y.o. female.    Chief Complaint: Pain (Still pain in right shoulder and having some lower back and hip pain that's on the right side)    Patient is a 49 yo M who present to the clinic for follow up related to complaints of right arm pain and numbness that started ten days ago, she reports  it has not really improved and it still is painful but no contractures, she also reports her low back has began to hurt more as well since last being seen.       Current Outpatient Medications:     amLODIPine (NORVASC) 5 MG tablet, Take 1 tablet (5 mg total) by mouth once daily., Disp: 30 tablet, Rfl: 5    ketoconazole (NIZORAL) 2 % shampoo, Apply topically twice a week., Disp: 120 mL, Rfl: 2    losartan (COZAAR) 50 MG tablet, Take 1 tablet (50 mg total) by mouth once daily., Disp: 90 tablet, Rfl: 1    metoprolol tartrate (LOPRESSOR) 50 MG tablet, Take 1 tablet (50 mg total) by mouth 2 (two) times a day., Disp: 180 tablet, Rfl: 1    pantoprazole (PROTONIX) 40 MG tablet, Take 1 tablet (40 mg total) by mouth once daily., Disp: 90 tablet, Rfl: 1    pregabalin (LYRICA) 75 MG capsule, Take 1 capsule (75 mg total) by mouth 2 (two) times daily., Disp: 60 capsule, Rfl: 0    triamterene-hydrochlorothiazide 37.5-25 mg (MAXZIDE-25) 37.5-25 mg per tablet, Take 1 tablet by mouth Daily., Disp: 90 tablet, Rfl: 1    Review of patient's allergies indicates:   Allergen Reactions    Neurontin [gabapentin] Shortness Of Breath    Lisinopril Other (See Comments)     cough    Aspirin Nausea And Vomiting       Past Medical History:   Diagnosis Date    Anxiety disorder, unspecified     Asthma     GERD (gastroesophageal reflux disease)     Hypertension     Lumbar pain     Pseudoseizures     Unspecified osteoarthritis, unspecified site        Past Surgical History:   Procedure Laterality Date    bladder tack      COLONOSCOPY      HYSTERECTOMY      LAPAROSCOPIC CHOLECYSTECTOMY N/A 03/10/2023    Procedure:  CHOLECYSTECTOMY, LAPAROSCOPIC;  Surgeon: Josue Ramirez MD;  Location: [facility];  Service: General;  Laterality: N/A;       Family History   Problem Relation Name Age of Onset    Hypertension Mother      Diabetes Mother      Diabetes Father      Hypertension Sister      Diabetes Maternal Aunt      Cancer Paternal Aunt      Cancer Paternal Uncle      Heart disease Maternal Grandmother      Diabetes Maternal Grandfather      Heart disease Maternal Grandfather      Heart disease Paternal Grandmother      Heart disease Paternal Grandfather         Social History     Tobacco Use    Smoking status: Never    Smokeless tobacco: Never   Substance Use Topics    Alcohol use: Never    Drug use: Never       Review of Systems   Constitutional:  Negative for fatigue and fever.   Musculoskeletal:  Positive for arthralgias, leg pain and myalgias. Negative for neck pain and neck stiffness.   Neurological:  Positive for weakness and numbness.       Current Medications:   Medication List with Changes/Refills   Current Medications    AMLODIPINE (NORVASC) 5 MG TABLET    Take 1 tablet (5 mg total) by mouth once daily.       Start Date: 6/13/2024 End Date: 12/10/2024    KETOCONAZOLE (NIZORAL) 2 % SHAMPOO    Apply topically twice a week.       Start Date: 12/7/2023 End Date: --    LOSARTAN (COZAAR) 50 MG TABLET    Take 1 tablet (50 mg total) by mouth once daily.       Start Date: 6/13/2024 End Date: 12/10/2024    METOPROLOL TARTRATE (LOPRESSOR) 50 MG TABLET    Take 1 tablet (50 mg total) by mouth 2 (two) times a day.       Start Date: 6/13/2024 End Date: 12/10/2024    PANTOPRAZOLE (PROTONIX) 40 MG TABLET    Take 1 tablet (40 mg total) by mouth once daily.       Start Date: 6/13/2024 End Date: 12/10/2024    PREGABALIN (LYRICA) 75 MG CAPSULE    Take 1 capsule (75 mg total) by mouth 2 (two) times daily.       Start Date: 9/9/2024  End Date: 3/8/2025    TRIAMTERENE-HYDROCHLOROTHIAZIDE 37.5-25 MG (MAXZIDE-25) 37.5-25 MG PER TABLET    Take 1  "tablet by mouth Daily.       Start Date: 6/13/2024 End Date: 12/10/2024            Objective:        Vitals:    09/16/24 1419   BP: 120/79   BP Location: Left arm   Patient Position: Sitting   BP Method: Large (Automatic)   Pulse: 84   Resp: 18   Temp: 98.7 °F (37.1 °C)   TempSrc: Oral   SpO2: 96%   Weight: 126 kg (277 lb 12.8 oz)   Height: 5' 6" (1.676 m)       Physical Exam  Vitals and nursing note reviewed.   Constitutional:       Appearance: Normal appearance. She is obese.   HENT:      Head: Normocephalic.      Right Ear: External ear normal.      Left Ear: External ear normal.      Nose: Nose normal.      Mouth/Throat:      Pharynx: Oropharynx is clear.   Eyes:      Extraocular Movements: Extraocular movements intact.      Conjunctiva/sclera: Conjunctivae normal.      Pupils: Pupils are equal, round, and reactive to light.   Cardiovascular:      Rate and Rhythm: Normal rate and regular rhythm.      Pulses: Normal pulses.      Heart sounds: Normal heart sounds.   Pulmonary:      Effort: Pulmonary effort is normal.      Breath sounds: Normal breath sounds.   Abdominal:      General: Bowel sounds are normal.      Palpations: Abdomen is soft.   Musculoskeletal:      Right shoulder: Tenderness present. Decreased range of motion.      Lumbar back: Tenderness present.   Skin:     General: Skin is warm.   Neurological:      Mental Status: She is alert and oriented to person, place, and time.      Sensory: Sensation is intact.      Motor: Motor function is intact.      Coordination: Coordination is intact.      Gait: Gait is intact.   Psychiatric:         Mood and Affect: Mood normal.         Behavior: Behavior normal.         Thought Content: Thought content normal.         Judgment: Judgment normal.           Lab Results   Component Value Date    WBC 7.66 09/09/2024    HGB 14.4 09/09/2024    HCT 44.7 09/09/2024     09/09/2024    CHOL 142 11/15/2023    TRIG 98 11/15/2023    HDL 47 11/15/2023    ALT 28 " 09/09/2024    AST 11 (L) 09/09/2024     09/09/2024    K 4.0 09/09/2024     09/09/2024    CREATININE 0.96 09/09/2024    BUN 15 09/09/2024    CO2 29 09/09/2024    TSH 1.680 09/09/2024    INR 0.87 (L) 11/22/2021    HGBA1C 5.3 11/15/2023      Assessment:       1. Acute pain of right shoulder    2. Radicular low back pain        Plan:         Problem List Items Addressed This Visit          Orthopedic    Radicular low back pain     Will refer to [facility] for evaluation for spinal injections as they have helped in the past per patient. Patient was agreeable with the plan.          Relevant Orders    Ambulatory referral/consult to Pain Clinic    Acute pain of right shoulder - Primary     Imaging at last visit did not show any acute issues some degenerative changes in the right shoulder AC joint space. Will refer to orthopedics for further evaluation.               Follow up in about 1 month (around 10/16/2024).    Sacah Byrd DO     Instructed patient that if symptoms fail to improve or worsen patient should seek immediate medical attention or report to the nearest emergency department. Patient expressed verbal agreement and understanding to this plan of care.

## 2024-09-16 NOTE — ASSESSMENT & PLAN NOTE
Will refer to [facility] for evaluation for spinal injections as they have helped in the past per patient. Patient was agreeable with the plan.

## 2024-09-18 ENCOUNTER — OFFICE VISIT (OUTPATIENT)
Dept: ORTHOPEDICS | Facility: CLINIC | Age: 49
End: 2024-09-18
Payer: COMMERCIAL

## 2024-09-18 DIAGNOSIS — M25.511 ACUTE PAIN OF RIGHT SHOULDER: ICD-10-CM

## 2024-09-18 DIAGNOSIS — M54.9 BACK PAIN, UNSPECIFIED BACK LOCATION, UNSPECIFIED BACK PAIN LATERALITY, UNSPECIFIED CHRONICITY: ICD-10-CM

## 2024-09-18 DIAGNOSIS — M54.2 NECK PAIN: Primary | ICD-10-CM

## 2024-09-18 PROCEDURE — 99213 OFFICE O/P EST LOW 20 MIN: CPT | Mod: PBBFAC,25 | Performed by: NURSE PRACTITIONER

## 2024-09-18 PROCEDURE — 99999PBSHW PR PBB SHADOW TECHNICAL ONLY FILED TO HB: Mod: PBBFAC,,,

## 2024-09-18 PROCEDURE — 20610 DRAIN/INJ JOINT/BURSA W/O US: CPT | Mod: PBBFAC | Performed by: NURSE PRACTITIONER

## 2024-09-18 PROCEDURE — 99999 PR PBB SHADOW E&M-EST. PATIENT-LVL III: CPT | Mod: PBBFAC,,, | Performed by: NURSE PRACTITIONER

## 2024-09-18 RX ORDER — NAPROXEN 500 MG/1
500 TABLET ORAL 2 TIMES DAILY
Qty: 30 TABLET | Refills: 0 | Status: SHIPPED | OUTPATIENT
Start: 2024-09-18

## 2024-09-18 RX ORDER — TRIAMCINOLONE ACETONIDE 40 MG/ML
80 INJECTION, SUSPENSION INTRA-ARTICULAR; INTRAMUSCULAR
Status: DISCONTINUED | OUTPATIENT
Start: 2024-09-18 | End: 2024-09-18 | Stop reason: HOSPADM

## 2024-09-18 RX ADMIN — TRIAMCINOLONE ACETONIDE 80 MG: 40 INJECTION, SUSPENSION INTRA-ARTICULAR; INTRAMUSCULAR at 08:09
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
## 2024-09-19 DIAGNOSIS — M54.12 CERVICAL RADICULOPATHY: Primary | ICD-10-CM

## 2024-09-23 ENCOUNTER — HOSPITAL ENCOUNTER (OUTPATIENT)
Dept: RADIOLOGY | Facility: HOSPITAL | Age: 49
Discharge: HOME | End: 2024-09-23
Attending: ORTHOPAEDIC SURGERY
Payer: COMMERCIAL

## 2024-09-23 ENCOUNTER — OFFICE VISIT (OUTPATIENT)
Dept: SPINE | Facility: CLINIC | Age: 49
End: 2024-09-23
Payer: COMMERCIAL

## 2024-09-23 DIAGNOSIS — M54.12 CERVICAL RADICULOPATHY: ICD-10-CM

## 2024-09-23 DIAGNOSIS — M50.30 DDD (DEGENERATIVE DISC DISEASE), CERVICAL: ICD-10-CM

## 2024-09-23 DIAGNOSIS — M51.36 DDD (DEGENERATIVE DISC DISEASE), LUMBAR: ICD-10-CM

## 2024-09-23 DIAGNOSIS — M54.16 LUMBAR RADICULOPATHY: ICD-10-CM

## 2024-09-23 DIAGNOSIS — G95.9 CERVICAL MYELOPATHY: Primary | ICD-10-CM

## 2024-09-23 PROCEDURE — 99213 OFFICE O/P EST LOW 20 MIN: CPT | Mod: PBBFAC,25 | Performed by: ORTHOPAEDIC SURGERY

## 2024-09-23 PROCEDURE — 72050 X-RAY EXAM NECK SPINE 4/5VWS: CPT | Mod: 26,,, | Performed by: ORTHOPAEDIC SURGERY

## 2024-09-23 PROCEDURE — 4010F ACE/ARB THERAPY RXD/TAKEN: CPT | Mod: CPTII,,, | Performed by: ORTHOPAEDIC SURGERY

## 2024-09-23 PROCEDURE — 72050 X-RAY EXAM NECK SPINE 4/5VWS: CPT | Mod: TC

## 2024-09-23 PROCEDURE — 99999 PR PBB SHADOW E&M-EST. PATIENT-LVL III: CPT | Mod: PBBFAC,,, | Performed by: ORTHOPAEDIC SURGERY

## 2024-09-23 PROCEDURE — 1159F MED LIST DOCD IN RCRD: CPT | Mod: CPTII,,, | Performed by: ORTHOPAEDIC SURGERY

## 2024-09-23 PROCEDURE — 99214 OFFICE O/P EST MOD 30 MIN: CPT | Mod: S$PBB,,, | Performed by: ORTHOPAEDIC SURGERY

## 2024-09-23 NOTE — PATIENT INSTRUCTIONS
Your MRI is scheduled for  09/30 at 11:30 at [facility] for   The address is [address] [place] [address]  The phone number is [phone]

## 2024-09-23 NOTE — PROGRESS NOTES
MDM/time:  30-35 minutes spent on this encounter including 10 minutes reviewing imaging and notes, 15 minutes with the patient, 5 minutes documentation    ASSESSMENT:  48 y.o. female with lumbar spondylosis with radiculopathy.  Cervical spondylosis and radiculopathy, concern for myelopathy    PLAN:  Continue Lyrica and home exercise program.  MRI cervical spine to assess for stenosis causing cervical myelopathy    HPI:  48 y.o. female here for repeat evaluation of lower back pain that radiates into left hip and down the left leg.  Reports that lately she has had worsening neck pain and right upper extremity numbness.  She also has numbness in bilateral lower extremities.  Denies any recent injuries.  She does report hand dysfunction and balance difficulty.  She completed physical therapy after last visit with some improvement.  She has been doing her medically directed home exercise program.  Patient reports pain has come and gone has pain approximately 10 years ago had to have injections and a rhizotomies that did give her pain relief.  Since February she has had increased pain with standing or sitting for any length of time.  No bladder or bowel incontinence.  Difficulty walking more than 10 minutes due to pain.  Currently takes Aleve as needed for pain.  No prior pain management.  No recent MRI.  No prior spine surgery.  Patient is not a smoker.    IMAGING:  X-rays lumbar spine reviewed show:  On the AP there is an oblique takeoff to the left from the lumbosacral junction.  There are 5 non-rib-bearing lumbar vertebrae.  On the lateral there is decreased lumbar lordosis.  There is spondylotic disease with decreased disc height and osteophyte formation noted.  No fractures or listhesis noted.  No instability on flexion-extension views.     X-ray cervical spine reviewed show:   On the AP there is normal coronal alignment.  There is uncovertebral and facet hypertrophy seen.  On the lateral there is loss of  cervical lordosis.  There are spondylotic changes noted with decrease in disc height and osteophyte formation seen.  No fractures or listhesis noted.  No instability on flexion-extension views.    Past Medical History:   Diagnosis Date    Anxiety disorder, unspecified     Asthma     GERD (gastroesophageal reflux disease)     Hypertension     Lumbar pain     Pseudoseizures     Unspecified osteoarthritis, unspecified site      Past Surgical History:   Procedure Laterality Date    bladder tack      COLONOSCOPY      HYSTERECTOMY      LAPAROSCOPIC CHOLECYSTECTOMY N/A 03/10/2023    Procedure: CHOLECYSTECTOMY, LAPAROSCOPIC;  Surgeon: Josue Ramirez MD;  Location: [facility];  Service: General;  Laterality: N/A;     Social History     Tobacco Use    Smoking status: Never    Smokeless tobacco: Never   Substance Use Topics    Alcohol use: Never    Drug use: Never      Current Outpatient Medications   Medication Instructions    amLODIPine (NORVASC) 5 mg, Oral, Daily    ketoconazole (NIZORAL) 2 % shampoo Topical (Top), Twice weekly    losartan (COZAAR) 50 mg, Oral, Daily    metoprolol tartrate (LOPRESSOR) 50 mg, Oral, 2 times daily    naproxen (NAPROSYN) 500 mg, Oral, 2 times daily    pantoprazole (PROTONIX) 40 mg, Oral, Daily    pregabalin (LYRICA) 75 mg, Oral, 2 times daily    triamterene-hydrochlorothiazide 37.5-25 mg (MAXZIDE-25) 37.5-25 mg per tablet 1 tablet, Oral, Daily        EXAM:  Constitutional  General Appearance:  There is no height or weight on file to calculate BMI., NAD  Psychiatric   Orientation: Oriented to time, oriented to place, oriented to person  Mood and Affect: Active and alert, normal mood, normal affect  Gait and Station   Appearance:  Antalgic gait to the left, unable to tandem gait, unable to walk on toes, unable to walk on heels    LUMBAR  Musculoskeletal System   Hips: Normal appearance, no leg length discrepancy, normal motion; left, normal motion; right    Lumbar Spine                    Inspection:  Normal alignment, normal sagittal balance                  Range of motion:  Decreased flexion, extension, lateral bending, rotation. Pain with range of motion                  Bony Palpation of the Lumbar Spine:  No tenderness of the spinous process, no tenderness of the sacrum, no tenderness of the coccyx                  Bony Palpation of the Right Hip:  No tenderness of the iliac crest, no tenderness of the sciatic notch, no tenderness of the SI joint                  Bony Palpation of the Left Hip:  No tenderness of the iliac crest, no tenderness of the sciatic notch, no tenderness of the SI joint                  Soft Tissue Palpation on the Right:  No tenderness of the paraspinal region, no tenderness of the iliolumbar region                  Soft Tissue Palpation on the Left:  No tenderness of the paraspinal region, no tenderness of the iliolumbar region    Motor Strength   L1 Right:  Hip flexion iliopsoas 5/5    L1 Left:  Hip flexion iliopsoas 5/5              L2-L4 Right:  Knee extension quadriceps 5/5, tibialis anterior 5/5              L2-L4 Left:  Knee extension quadriceps 5/5, tibialis anterior 5/5   L5 Right:  Extensor hallucis llongus 5/5,    L5 Left:  Extensor hallucis longus 5/5,    S1 Right:  Plantar flexion gastrocnemius 5/5   S1 Left:  Plantar flexion gastrocnemius 5/5    Neurological System   Ankle Reflex Right:  normal   Ankle Reflex Left: normal   Knee Reflex Right:  normal   Knee Reflex Left:  normal   Sensation on the Right:  L2 normal, L3 normal, L4 normal, L5 normal, S1 normal   Sensation on the Left:  L2 normal, L3 normal, L4 normal, L5 normal, S1 normal              Special Test on the Right:  Seated straight leg raising test negative, no clonus of the ankle              Special Test on the Left:  Seated straight leg raising test negative, no clonus of the ankle    Skin   Lumbosacral Spine:  Normal skin    Cardiovascular System   Arterial Pulses Right:  Posterior tibialis  normal, dorsalis pedis normal   Arterial Pulses Left:  Posterior tibialis normal, dorsalis pedis normal   Edema Right: None   Edema Left:  None

## 2024-09-25 ENCOUNTER — PATIENT MESSAGE (OUTPATIENT)
Dept: SPINE | Facility: CLINIC | Age: 49
End: 2024-09-25
Payer: COMMERCIAL

## 2024-10-18 ENCOUNTER — OFFICE VISIT (OUTPATIENT)
Dept: SPINE | Facility: CLINIC | Age: 49
End: 2024-10-18
Payer: COMMERCIAL

## 2024-10-18 DIAGNOSIS — M50.30 DDD (DEGENERATIVE DISC DISEASE), CERVICAL: ICD-10-CM

## 2024-10-18 DIAGNOSIS — M54.12 CERVICAL RADICULOPATHY: ICD-10-CM

## 2024-10-18 DIAGNOSIS — M54.16 LUMBAR RADICULOPATHY: ICD-10-CM

## 2024-10-18 DIAGNOSIS — G95.9 CERVICAL MYELOPATHY: Primary | ICD-10-CM

## 2024-10-18 PROCEDURE — 1159F MED LIST DOCD IN RCRD: CPT | Mod: CPTII,,, | Performed by: ORTHOPAEDIC SURGERY

## 2024-10-18 PROCEDURE — 4010F ACE/ARB THERAPY RXD/TAKEN: CPT | Mod: CPTII,,, | Performed by: ORTHOPAEDIC SURGERY

## 2024-10-18 PROCEDURE — 99213 OFFICE O/P EST LOW 20 MIN: CPT | Mod: PBBFAC | Performed by: ORTHOPAEDIC SURGERY

## 2024-10-18 PROCEDURE — 99214 OFFICE O/P EST MOD 30 MIN: CPT | Mod: S$PBB,,, | Performed by: ORTHOPAEDIC SURGERY

## 2024-10-18 PROCEDURE — 99999 PR PBB SHADOW E&M-EST. PATIENT-LVL III: CPT | Mod: PBBFAC,,, | Performed by: ORTHOPAEDIC SURGERY

## 2024-10-18 NOTE — PROGRESS NOTES
MDM/time:  30-35 minutes spent on this encounter including 10 minutes reviewing imaging and notes, 15 minutes with the patient, 5 minutes documentation    ASSESSMENT:  49 y.o. female with lumbar spondylosis with radiculopathy.  Cervical spondylosis and myeloradiculopathy    PLAN:  She would like to proceed with surgery for cervical spondylosis and myelopathy.  This will be a C6-7 ACDF    HPI:  49 y.o. female here for repeat evaluation of lower back pain that radiates into left hip and down the left leg.  Reports that lately she has had worsening neck pain and right upper extremity numbness.  She also has numbness in bilateral lower extremities.  Denies any recent injuries.  She does report hand dysfunction and balance difficulty.  She completed physical therapy after last visit with some improvement.  She has been doing her medically directed home exercise program.  Patient reports pain has come and gone has pain approximately 10 years ago had to have injections and a rhizotomies that did give her pain relief.  Since February she has had increased pain with standing or sitting for any length of time.  No bladder or bowel incontinence.  Difficulty walking more than 10 minutes due to pain.  Currently takes Aleve as needed for pain.  No prior pain management.  No prior spine surgery.  Patient is not a smoker.    IMAGING:  X-rays lumbar spine reviewed show:  On the AP there is an oblique takeoff to the left from the lumbosacral junction.  There are 5 non-rib-bearing lumbar vertebrae.  On the lateral there is decreased lumbar lordosis.  There is spondylotic disease with decreased disc height and osteophyte formation noted.  No fractures or listhesis noted.  No instability on flexion-extension views.     X-ray cervical spine reviewed show:   On the AP there is normal coronal alignment.  There is uncovertebral and facet hypertrophy seen.  On the lateral there is loss of cervical lordosis.  There are spondylotic changes  noted with decrease in disc height and osteophyte formation seen.  No fractures or listhesis noted.  No instability on flexion-extension views.    MRI cervical spine 10/18/2024 reviewed shows:   At C6-7 there is moderate central stenosis.  Moderate bilateral foraminal stenosis    Past Medical History:   Diagnosis Date    Anxiety disorder, unspecified     Asthma     GERD (gastroesophageal reflux disease)     Hypertension     Lumbar pain     Pseudoseizures     Unspecified osteoarthritis, unspecified site      Past Surgical History:   Procedure Laterality Date    bladder tack      COLONOSCOPY      HYSTERECTOMY      LAPAROSCOPIC CHOLECYSTECTOMY N/A 03/10/2023    Procedure: CHOLECYSTECTOMY, LAPAROSCOPIC;  Surgeon: Josue Ramirez MD;  Location: [facility];  Service: General;  Laterality: N/A;     Social History     Tobacco Use    Smoking status: Never    Smokeless tobacco: Never   Substance Use Topics    Alcohol use: Never    Drug use: Never      Current Outpatient Medications   Medication Instructions    amLODIPine (NORVASC) 5 mg, Oral, Daily    ketoconazole (NIZORAL) 2 % shampoo Topical (Top), Twice weekly    losartan (COZAAR) 50 mg, Oral, Daily    metoprolol tartrate (LOPRESSOR) 50 mg, Oral, 2 times daily    naproxen (NAPROSYN) 500 mg, Oral, 2 times daily    pantoprazole (PROTONIX) 40 mg, Oral, Daily    pregabalin (LYRICA) 75 mg, Oral, 2 times daily    triamterene-hydrochlorothiazide 37.5-25 mg (MAXZIDE-25) 37.5-25 mg per tablet 1 tablet, Oral, Daily        EXAM:  Constitutional  General Appearance:  There is no height or weight on file to calculate BMI., NAD  Psychiatric   Orientation: Oriented to time, oriented to place, oriented to person  Mood and Affect: Active and alert, normal mood, normal affect  Gait and Station   Appearance:  Antalgic gait to the left, unable to tandem gait, unable to walk on toes, unable to walk on heels    LUMBAR  Musculoskeletal System   Hips: Normal appearance, no leg length discrepancy,  normal motion; left, normal motion; right    Lumbar Spine                   Inspection:  Normal alignment, normal sagittal balance                  Range of motion:  Decreased flexion, extension, lateral bending, rotation. Pain with range of motion                  Bony Palpation of the Lumbar Spine:  No tenderness of the spinous process, no tenderness of the sacrum, no tenderness of the coccyx                  Bony Palpation of the Right Hip:  No tenderness of the iliac crest, no tenderness of the sciatic notch, no tenderness of the SI joint                  Bony Palpation of the Left Hip:  No tenderness of the iliac crest, no tenderness of the sciatic notch, no tenderness of the SI joint                  Soft Tissue Palpation on the Right:  No tenderness of the paraspinal region, no tenderness of the iliolumbar region                  Soft Tissue Palpation on the Left:  No tenderness of the paraspinal region, no tenderness of the iliolumbar region    Motor Strength   L1 Right:  Hip flexion iliopsoas 5/5    L1 Left:  Hip flexion iliopsoas 5/5              L2-L4 Right:  Knee extension quadriceps 5/5, tibialis anterior 5/5              L2-L4 Left:  Knee extension quadriceps 5/5, tibialis anterior 5/5   L5 Right:  Extensor hallucis llongus 5/5,    L5 Left:  Extensor hallucis longus 5/5,    S1 Right:  Plantar flexion gastrocnemius 5/5   S1 Left:  Plantar flexion gastrocnemius 5/5    Neurological System   Ankle Reflex Right:  normal   Ankle Reflex Left: normal   Knee Reflex Right:  normal   Knee Reflex Left:  normal   Sensation on the Right:  L2 normal, L3 normal, L4 normal, L5 normal, S1 normal   Sensation on the Left:  L2 normal, L3 normal, L4 normal, L5 normal, S1 normal              Special Test on the Right:  Seated straight leg raising test negative, no clonus of the ankle              Special Test on the Left:  Seated straight leg raising test negative, no clonus of the ankle    Skin   Lumbosacral Spine:  Normal  skin    Cardiovascular System   Arterial Pulses Right:  Posterior tibialis normal, dorsalis pedis normal   Arterial Pulses Left:  Posterior tibialis normal, dorsalis pedis normal   Edema Right: None   Edema Left:  None

## 2024-10-28 ENCOUNTER — PATIENT MESSAGE (OUTPATIENT)
Dept: SPINE | Facility: CLINIC | Age: 49
End: 2024-10-28
Payer: COMMERCIAL

## 2024-11-04 ENCOUNTER — OFFICE VISIT (OUTPATIENT)
Dept: FAMILY MEDICINE | Facility: CLINIC | Age: 49
End: 2024-11-04
Payer: COMMERCIAL

## 2024-11-04 ENCOUNTER — CLINICAL SUPPORT (OUTPATIENT)
Dept: CARDIOLOGY | Facility: CLINIC | Age: 49
End: 2024-11-04
Payer: COMMERCIAL

## 2024-11-04 VITALS
BODY MASS INDEX: 43.42 KG/M2 | DIASTOLIC BLOOD PRESSURE: 86 MMHG | HEART RATE: 62 BPM | HEIGHT: 66 IN | TEMPERATURE: 98 F | WEIGHT: 270.19 LBS | SYSTOLIC BLOOD PRESSURE: 129 MMHG | OXYGEN SATURATION: 99 % | RESPIRATION RATE: 18 BRPM

## 2024-11-04 DIAGNOSIS — I10 HYPERTENSION, UNSPECIFIED TYPE: ICD-10-CM

## 2024-11-04 DIAGNOSIS — I10 HYPERTENSION, UNSPECIFIED TYPE: Primary | ICD-10-CM

## 2024-11-04 DIAGNOSIS — Z71.9 HEALTH EDUCATION/COUNSELING: ICD-10-CM

## 2024-11-04 DIAGNOSIS — Z12.31 OTHER SCREENING MAMMOGRAM: ICD-10-CM

## 2024-11-04 DIAGNOSIS — Z01.818 PRE-OPERATIVE EXAMINATION: ICD-10-CM

## 2024-11-04 LAB
ALBUMIN SERPL BCP-MCNC: 3.8 G/DL (ref 3.5–5)
ALBUMIN/GLOB SERPL: 1.3 {RATIO}
ALP SERPL-CCNC: 64 U/L (ref 39–100)
ALT SERPL W P-5'-P-CCNC: 22 U/L (ref 13–56)
ANION GAP SERPL CALCULATED.3IONS-SCNC: 9 MMOL/L (ref 7–16)
AST SERPL W P-5'-P-CCNC: 7 U/L (ref 15–37)
BASOPHILS # BLD AUTO: 0.09 K/UL (ref 0–0.2)
BASOPHILS NFR BLD AUTO: 0.8 % (ref 0–1)
BILIRUB SERPL-MCNC: 0.7 MG/DL (ref ?–1.2)
BILIRUB UR QL STRIP: NEGATIVE
BUN SERPL-MCNC: 12 MG/DL (ref 7–18)
BUN/CREAT SERPL: 11 (ref 6–20)
CALCIUM SERPL-MCNC: 9.1 MG/DL (ref 8.5–10.1)
CHLORIDE SERPL-SCNC: 104 MMOL/L (ref 98–107)
CLARITY UR: CLEAR
CO2 SERPL-SCNC: 30 MMOL/L (ref 21–32)
COLOR UR: NORMAL
CREAT SERPL-MCNC: 1.12 MG/DL (ref 0.55–1.02)
DIFFERENTIAL METHOD BLD: ABNORMAL
EGFR (NO RACE VARIABLE) (RUSH/TITUS): 61 ML/MIN/1.73M2
EOSINOPHIL # BLD AUTO: 0.07 K/UL (ref 0–0.5)
EOSINOPHIL NFR BLD AUTO: 0.6 % (ref 1–4)
ERYTHROCYTE [DISTWIDTH] IN BLOOD BY AUTOMATED COUNT: 13.2 % (ref 11.5–14.5)
EST. AVERAGE GLUCOSE BLD GHB EST-MCNC: 105 MG/DL
GLOBULIN SER-MCNC: 3 G/DL (ref 2–4)
GLUCOSE SERPL-MCNC: 83 MG/DL (ref 74–106)
GLUCOSE UR STRIP-MCNC: NORMAL MG/DL
HBA1C MFR BLD HPLC: 5.3 % (ref 4.5–6.6)
HCT VFR BLD AUTO: 44.2 % (ref 38–47)
HCV AB SER QL: NORMAL
HGB BLD-MCNC: 13.9 G/DL (ref 12–16)
HIV 1+O+2 AB SERPL QL: NORMAL
IMM GRANULOCYTES # BLD AUTO: 0.02 K/UL (ref 0–0.04)
IMM GRANULOCYTES NFR BLD: 0.2 % (ref 0–0.4)
INDIRECT COOMBS: NORMAL
KETONES UR STRIP-SCNC: NEGATIVE MG/DL
LEUKOCYTE ESTERASE UR QL STRIP: NEGATIVE
LYMPHOCYTES # BLD AUTO: 2.89 K/UL (ref 1–4.8)
LYMPHOCYTES NFR BLD AUTO: 26.1 % (ref 27–41)
MCH RBC QN AUTO: 28.8 PG (ref 27–31)
MCHC RBC AUTO-ENTMCNC: 31.4 G/DL (ref 32–36)
MCV RBC AUTO: 91.5 FL (ref 80–96)
MONOCYTES # BLD AUTO: 0.85 K/UL (ref 0–0.8)
MONOCYTES NFR BLD AUTO: 7.7 % (ref 2–6)
MPC BLD CALC-MCNC: 11 FL (ref 9.4–12.4)
NEUTROPHILS # BLD AUTO: 7.17 K/UL (ref 1.8–7.7)
NEUTROPHILS NFR BLD AUTO: 64.6 % (ref 53–65)
NITRITE UR QL STRIP: NEGATIVE
NRBC # BLD AUTO: 0 X10E3/UL
NRBC, AUTO (.00): 0 %
PH UR STRIP: 7 PH UNITS
PLATELET # BLD AUTO: 368 K/UL (ref 150–400)
POTASSIUM SERPL-SCNC: 3.4 MMOL/L (ref 3.5–5.1)
PROT SERPL-MCNC: 6.8 G/DL (ref 6.4–8.2)
PROT UR QL STRIP: NEGATIVE
RBC # BLD AUTO: 4.83 M/UL (ref 4.2–5.4)
RBC # UR STRIP: NEGATIVE /UL
RH BLD: NORMAL
SODIUM SERPL-SCNC: 140 MMOL/L (ref 136–145)
SP GR UR STRIP: 1.01
SPECIMEN OUTDATE: NORMAL
UROBILINOGEN UR STRIP-ACNC: NORMAL MG/DL
WBC # BLD AUTO: 11.09 K/UL (ref 4.5–11)

## 2024-11-04 PROCEDURE — 87389 HIV-1 AG W/HIV-1&-2 AB AG IA: CPT | Mod: ,,, | Performed by: CLINICAL MEDICAL LABORATORY

## 2024-11-04 PROCEDURE — 85025 COMPLETE CBC W/AUTO DIFF WBC: CPT | Mod: ,,, | Performed by: CLINICAL MEDICAL LABORATORY

## 2024-11-04 PROCEDURE — 99999 PR PBB SHADOW E&M-EST. PATIENT-LVL II: CPT | Mod: PBBFAC,,,

## 2024-11-04 PROCEDURE — 99212 OFFICE O/P EST SF 10 MIN: CPT | Mod: PBBFAC

## 2024-11-04 PROCEDURE — 99213 OFFICE O/P EST LOW 20 MIN: CPT | Mod: GC,,, | Performed by: FAMILY MEDICINE

## 2024-11-04 PROCEDURE — 80053 COMPREHEN METABOLIC PANEL: CPT | Mod: ,,, | Performed by: CLINICAL MEDICAL LABORATORY

## 2024-11-04 PROCEDURE — 83036 HEMOGLOBIN GLYCOSYLATED A1C: CPT | Mod: ,,, | Performed by: CLINICAL MEDICAL LABORATORY

## 2024-11-04 PROCEDURE — 3074F SYST BP LT 130 MM HG: CPT | Mod: CPTII,,, | Performed by: FAMILY MEDICINE

## 2024-11-04 PROCEDURE — 4010F ACE/ARB THERAPY RXD/TAKEN: CPT | Mod: CPTII,,, | Performed by: FAMILY MEDICINE

## 2024-11-04 PROCEDURE — 3008F BODY MASS INDEX DOCD: CPT | Mod: CPTII,,, | Performed by: FAMILY MEDICINE

## 2024-11-04 PROCEDURE — 3079F DIAST BP 80-89 MM HG: CPT | Mod: CPTII,,, | Performed by: FAMILY MEDICINE

## 2024-11-04 PROCEDURE — 1159F MED LIST DOCD IN RCRD: CPT | Mod: CPTII,,, | Performed by: FAMILY MEDICINE

## 2024-11-04 PROCEDURE — 86803 HEPATITIS C AB TEST: CPT | Mod: ,,, | Performed by: CLINICAL MEDICAL LABORATORY

## 2024-11-04 PROCEDURE — 81003 URINALYSIS AUTO W/O SCOPE: CPT | Mod: QW,,, | Performed by: CLINICAL MEDICAL LABORATORY

## 2024-11-04 NOTE — ASSESSMENT & PLAN NOTE
Patient well control with current regimen.   BP today 129/86  Continue:  Amlodipine 5 mg once daily  Losartan 50 mg once daily  Lopressor (metoprolol) 50 mg, take 1 tablet BID  Maxzide (triamterene-hctz 37.5-25 mg once daily    - continue to monitor BP at home  - watch out for symptoms as headaches, dizziness, chest pain, palpitations, lightheadedness, or blurred vision  - manage a BP log and bring it to your next appointment  - maintain healthy life style with dietary restriction such as low salt and low sodium diet.  - engage in physical exercise for at least 30 minutes a day/5 days a week  - take medications regularly     - EKG today for Pre-surgical evaluation    Results for orders placed during the hospital encounter of 12/08/23  Echo    Interpretation Summary    Left Ventricle: The left ventricle is normal in size. Increased wall thickness. Septal thickening. There is concentric hypertrophy. Normal wall motion. There is normal systolic function with a visually estimated ejection fraction of 60 - 65%. There is normal diastolic function.    Right Ventricle: Normal right ventricular cavity size. Systolic function is normal.    Aortic Valve: The aortic valve is a trileaflet valve.    Mitral Valve: There is mild regurgitation.    Tricuspid Valve: There is mild regurgitation.    Pulmonary Artery: The estimated pulmonary artery systolic pressure is 19 mmHg.    IVC/SVC: Normal venous pressure at 3 mmHg.

## 2024-11-04 NOTE — PROGRESS NOTES
Subjective:       Patient ID: Jonathan Rodriguez is a 49 y.o. female.    Chief Complaint: Hypertension (Room 8 patient here for surgical clearance per Dr. Ellis )    49 year old female presents to the clinic today for surgical clearance for neck surgery in about 10 days with Dr. Ellis. Lab work and EKG to be done today. Reschedule mammogram.           Current Outpatient Medications:     amLODIPine (NORVASC) 5 MG tablet, Take 1 tablet (5 mg total) by mouth once daily., Disp: 30 tablet, Rfl: 5    ketoconazole (NIZORAL) 2 % shampoo, Apply topically twice a week., Disp: 120 mL, Rfl: 2    losartan (COZAAR) 50 MG tablet, Take 1 tablet (50 mg total) by mouth once daily., Disp: 90 tablet, Rfl: 1    metoprolol tartrate (LOPRESSOR) 50 MG tablet, Take 1 tablet (50 mg total) by mouth 2 (two) times a day., Disp: 180 tablet, Rfl: 1    naproxen (NAPROSYN) 500 MG tablet, Take 1 tablet (500 mg total) by mouth 2 (two) times daily., Disp: 30 tablet, Rfl: 0    pantoprazole (PROTONIX) 40 MG tablet, Take 1 tablet (40 mg total) by mouth once daily., Disp: 90 tablet, Rfl: 1    pregabalin (LYRICA) 75 MG capsule, Take 1 capsule (75 mg total) by mouth 2 (two) times daily., Disp: 60 capsule, Rfl: 0    triamterene-hydrochlorothiazide 37.5-25 mg (MAXZIDE-25) 37.5-25 mg per tablet, Take 1 tablet by mouth Daily., Disp: 90 tablet, Rfl: 1    Review of patient's allergies indicates:   Allergen Reactions    Gabapentin Shortness Of Breath    Lisinopril Other (See Comments)     cough    Aspirin Nausea And Vomiting       Past Medical History:   Diagnosis Date    Anxiety disorder, unspecified     Asthma     GERD (gastroesophageal reflux disease)     Hypertension     Lumbar pain     Pseudoseizures     Unspecified osteoarthritis, unspecified site        Past Surgical History:   Procedure Laterality Date    bladder tack      COLONOSCOPY      HYSTERECTOMY      LAPAROSCOPIC CHOLECYSTECTOMY N/A 03/10/2023    Procedure: CHOLECYSTECTOMY, LAPAROSCOPIC;  Surgeon: Josue TEE  "MD James;  Location: [facility];  Service: General;  Laterality: N/A;       Family History   Problem Relation Name Age of Onset    Hypertension Mother      Diabetes Mother      Diabetes Father      Hypertension Sister      Diabetes Maternal Aunt      Cancer Paternal Aunt      Cancer Paternal Uncle      Heart disease Maternal Grandmother      Diabetes Maternal Grandfather      Heart disease Maternal Grandfather      Heart disease Paternal Grandmother      Heart disease Paternal Grandfather         Social History     Tobacco Use    Smoking status: Never    Smokeless tobacco: Never   Substance Use Topics    Alcohol use: Never    Drug use: Never       Review of Systems   Constitutional:  Negative for appetite change, fatigue and fever.   HENT:  Negative for ear pain.    Eyes:  Negative for visual disturbance.   Respiratory:  Negative for cough, chest tightness and shortness of breath.    Cardiovascular:  Negative for chest pain and leg swelling.   Gastrointestinal:  Negative for abdominal pain, diarrhea, nausea and vomiting.   Genitourinary:  Negative for difficulty urinating and flank pain.   Musculoskeletal:  Positive for neck pain. Negative for gait problem.   Integumentary:  Negative for rash and wound.   Neurological:  Negative for dizziness, syncope, light-headedness and headaches.   Psychiatric/Behavioral:  Negative for agitation and confusion.            Objective:      Vitals:    11/04/24 1116   BP: 129/86   BP Location: Left arm   Patient Position: Sitting   Pulse: 62   Resp: 18   Temp: 97.9 °F (36.6 °C)   TempSrc: Oral   SpO2: 99%   Weight: 122.6 kg (270 lb 3.2 oz)   Height: 5' 6" (1.676 m)     Physical Exam  Vitals reviewed.   Constitutional:       General: She is not in acute distress.     Appearance: Normal appearance. She is obese. She is not ill-appearing.   HENT:      Head: Normocephalic and atraumatic.      Right Ear: External ear normal.      Left Ear: External ear normal.      Nose: Nose normal. No " congestion.      Mouth/Throat:      Mouth: Mucous membranes are moist.   Eyes:      Conjunctiva/sclera: Conjunctivae normal.   Cardiovascular:      Rate and Rhythm: Normal rate and regular rhythm.      Pulses: Normal pulses.      Heart sounds: Normal heart sounds.   Pulmonary:      Effort: Pulmonary effort is normal. No respiratory distress.      Breath sounds: Normal breath sounds.   Abdominal:      General: Bowel sounds are normal.      Tenderness: There is no abdominal tenderness.   Musculoskeletal:         General: No swelling. Normal range of motion.      Cervical back: Normal range of motion.   Skin:     General: Skin is warm.      Coloration: Skin is not jaundiced.   Neurological:      General: No focal deficit present.      Mental Status: She is alert and oriented to person, place, and time.   Psychiatric:         Mood and Affect: Mood normal.         Behavior: Behavior normal.         Thought Content: Thought content normal.           Lab Results   Component Value Date    WBC 7.66 09/09/2024    HGB 14.4 09/09/2024    HCT 44.7 09/09/2024     09/09/2024    CHOL 142 11/15/2023    TRIG 98 11/15/2023    HDL 47 11/15/2023    ALT 28 09/09/2024    AST 11 (L) 09/09/2024     09/09/2024    K 4.0 09/09/2024     09/09/2024    CREATININE 0.96 09/09/2024    BUN 15 09/09/2024    CO2 29 09/09/2024    TSH 1.680 09/09/2024    INR 0.87 (L) 11/22/2021    HGBA1C 5.3 11/15/2023      Assessment:       1. Hypertension, unspecified type    2. Pre-operative examination    3. Other screening mammogram    4. Health education/counseling        Plan:         Problem List Items Addressed This Visit       Hypertension - Primary     Patient well control with current regimen.   BP today 129/86  Continue:  Amlodipine 5 mg once daily  Losartan 50 mg once daily  Lopressor (metoprolol) 50 mg, take 1 tablet BID  Maxzide (triamterene-hctz 37.5-25 mg once daily    - continue to monitor BP at home  - watch out for symptoms as  headaches, dizziness, chest pain, palpitations, lightheadedness, or blurred vision  - manage a BP log and bring it to your next appointment  - maintain healthy life style with dietary restriction such as low salt and low sodium diet.  - engage in physical exercise for at least 30 minutes a day/5 days a week  - take medications regularly     - EKG today for Pre-surgical evaluation    Results for orders placed during the hospital encounter of 12/08/23  Echo    Interpretation Summary    Left Ventricle: The left ventricle is normal in size. Increased wall thickness. Septal thickening. There is concentric hypertrophy. Normal wall motion. There is normal systolic function with a visually estimated ejection fraction of 60 - 65%. There is normal diastolic function.    Right Ventricle: Normal right ventricular cavity size. Systolic function is normal.    Aortic Valve: The aortic valve is a trileaflet valve.    Mitral Valve: There is mild regurgitation.    Tricuspid Valve: There is mild regurgitation.    Pulmonary Artery: The estimated pulmonary artery systolic pressure is 19 mmHg.    IVC/SVC: Normal venous pressure at 3 mmHg.           Relevant Orders    EKG 12-lead    Health education/counseling     Lifestyle recommendation talked.   Will probably need physical therapy after surgery.            Pre-operative examination     Patient following with Ortho Dr. Ellis and plan for neck surgery in about 10 days. Patient comes with lab work request.   Physical examination with no acute findings and patients has no complaints at the moment, expect for neck pain.     Plan:  EKG  CBC  CMP  UA  Hg A1C  Hep C  HIV  PT/PTT  Type and screen.     Lab work done today and awaiting results.           Other Visit Diagnoses       Other screening mammogram        Relevant Orders    Mammo Digital Screening Bilat              Follow up in about 3 months (around 2/4/2025).    Kj Kramer MD     Instructed patient that if symptoms fail to improve  or worsen patient should seek immediate medical attention or report to the nearest emergency department. Patient expressed verbal agreement and understanding to this plan of care.

## 2024-11-04 NOTE — ASSESSMENT & PLAN NOTE
Patient following with Ortho Dr. Ellis and plan for neck surgery in about 10 days. Patient comes with lab work request.   Physical examination with no acute findings and patients has no complaints at the moment, expect for neck pain.     Plan:  EKG  CBC  CMP  UA  Hg A1C  Hep C  HIV  PT/PTT  Type and screen.     Lab work done today and awaiting results.

## 2024-11-05 LAB
APTT PPP: 33.7 SECONDS (ref 25.2–37.3)
INR BLD: 0.93
PROTHROMBIN TIME: 12.4 SECONDS (ref 11.7–14.7)

## 2024-11-07 DIAGNOSIS — Z01.818 PRE-OPERATIVE CLEARANCE: Primary | ICD-10-CM

## 2024-11-08 ENCOUNTER — TELEPHONE (OUTPATIENT)
Dept: SPINE | Facility: CLINIC | Age: 49
End: 2024-11-08
Payer: COMMERCIAL

## 2024-11-08 ENCOUNTER — TELEPHONE (OUTPATIENT)
Dept: CARDIOLOGY | Facility: CLINIC | Age: 49
End: 2024-11-08
Payer: COMMERCIAL

## 2024-11-08 NOTE — TELEPHONE ENCOUNTER
----- Message from Josefa sent at 11/8/2024 10:51 AM CST -----  Regarding: Surgery Problem  Who Called: Jonathan Need    Caller is requesting assistance/information from provider's office.    Symptoms (please be specific): Cardiology said that they cannot do her surgery clearance until Thursday, but she has surgery Thursday so she is just wanting to know what to do.       Preferred Method of Contact: Phone Call  Patient's Preferred Phone Number on File: [phone]

## 2024-11-08 NOTE — TELEPHONE ENCOUNTER
Attempted to contact pt to let her know she was scheduled incorrectly and let her know we needed to get her scheduled correctly. No answer. LVM stating she was scheduled for a new patient nurse visit, and nurses do not do cardiac clearances. In the message, I told pt to not to come to her visit on Monday that somebody would be calling her to get her rescheduled for the correct appointment with one of the doctors. I left a telephone number for pt to call back so that she could be rescheduled for a NP: cardiac clearance appointment with the doctors and asked her again to not come to the nurse visit that was scheduled on Monday.

## 2024-11-11 ENCOUNTER — TELEPHONE (OUTPATIENT)
Dept: SPINE | Facility: CLINIC | Age: 49
End: 2024-11-11
Payer: COMMERCIAL

## 2024-11-11 ENCOUNTER — OFFICE VISIT (OUTPATIENT)
Dept: CARDIOLOGY | Facility: CLINIC | Age: 49
End: 2024-11-11
Payer: COMMERCIAL

## 2024-11-11 ENCOUNTER — OFFICE VISIT (OUTPATIENT)
Dept: SPINE | Facility: CLINIC | Age: 49
End: 2024-11-11
Payer: COMMERCIAL

## 2024-11-11 ENCOUNTER — HOSPITAL ENCOUNTER (OUTPATIENT)
Dept: RADIOLOGY | Facility: HOSPITAL | Age: 49
Discharge: HOME | End: 2024-11-11
Attending: ORTHOPAEDIC SURGERY
Payer: COMMERCIAL

## 2024-11-11 VITALS
OXYGEN SATURATION: 98 % | WEIGHT: 270.81 LBS | SYSTOLIC BLOOD PRESSURE: 130 MMHG | BODY MASS INDEX: 43.52 KG/M2 | HEART RATE: 62 BPM | DIASTOLIC BLOOD PRESSURE: 98 MMHG | HEIGHT: 66 IN

## 2024-11-11 DIAGNOSIS — R06.02 SHORTNESS OF BREATH: ICD-10-CM

## 2024-11-11 DIAGNOSIS — M54.16 LUMBAR RADICULOPATHY: ICD-10-CM

## 2024-11-11 DIAGNOSIS — Z01.818 PRE-OPERATIVE CLEARANCE: ICD-10-CM

## 2024-11-11 DIAGNOSIS — Z01.818 PRE-OPERATIVE EXAMINATION: ICD-10-CM

## 2024-11-11 DIAGNOSIS — R07.9 CHEST PAIN, UNSPECIFIED TYPE: Primary | ICD-10-CM

## 2024-11-11 DIAGNOSIS — G95.9 CERVICAL MYELOPATHY: Primary | ICD-10-CM

## 2024-11-11 DIAGNOSIS — M54.12 CERVICAL RADICULOPATHY: ICD-10-CM

## 2024-11-11 DIAGNOSIS — I34.0 MITRAL VALVE INSUFFICIENCY, UNSPECIFIED ETIOLOGY: ICD-10-CM

## 2024-11-11 PROCEDURE — 4010F ACE/ARB THERAPY RXD/TAKEN: CPT | Mod: CPTII,,, | Performed by: STUDENT IN AN ORGANIZED HEALTH CARE EDUCATION/TRAINING PROGRAM

## 2024-11-11 PROCEDURE — 4010F ACE/ARB THERAPY RXD/TAKEN: CPT | Mod: CPTII,,, | Performed by: ORTHOPAEDIC SURGERY

## 2024-11-11 PROCEDURE — 3008F BODY MASS INDEX DOCD: CPT | Mod: CPTII,,, | Performed by: STUDENT IN AN ORGANIZED HEALTH CARE EDUCATION/TRAINING PROGRAM

## 2024-11-11 PROCEDURE — 1159F MED LIST DOCD IN RCRD: CPT | Mod: CPTII,,, | Performed by: ORTHOPAEDIC SURGERY

## 2024-11-11 PROCEDURE — 99999 PR PBB SHADOW E&M-EST. PATIENT-LVL III: CPT | Mod: PBBFAC,,, | Performed by: ORTHOPAEDIC SURGERY

## 2024-11-11 PROCEDURE — 71046 X-RAY EXAM CHEST 2 VIEWS: CPT | Mod: 26,,, | Performed by: RADIOLOGY

## 2024-11-11 PROCEDURE — 3080F DIAST BP >= 90 MM HG: CPT | Mod: CPTII,,, | Performed by: STUDENT IN AN ORGANIZED HEALTH CARE EDUCATION/TRAINING PROGRAM

## 2024-11-11 PROCEDURE — 99213 OFFICE O/P EST LOW 20 MIN: CPT | Mod: PBBFAC,25 | Performed by: ORTHOPAEDIC SURGERY

## 2024-11-11 PROCEDURE — 99215 OFFICE O/P EST HI 40 MIN: CPT | Mod: PBBFAC,25 | Performed by: STUDENT IN AN ORGANIZED HEALTH CARE EDUCATION/TRAINING PROGRAM

## 2024-11-11 PROCEDURE — 99215 OFFICE O/P EST HI 40 MIN: CPT | Mod: S$PBB,,, | Performed by: ORTHOPAEDIC SURGERY

## 2024-11-11 PROCEDURE — 3044F HG A1C LEVEL LT 7.0%: CPT | Mod: CPTII,,, | Performed by: ORTHOPAEDIC SURGERY

## 2024-11-11 PROCEDURE — 99999 PR PBB SHADOW E&M-EST. PATIENT-LVL V: CPT | Mod: PBBFAC,,, | Performed by: STUDENT IN AN ORGANIZED HEALTH CARE EDUCATION/TRAINING PROGRAM

## 2024-11-11 PROCEDURE — 3044F HG A1C LEVEL LT 7.0%: CPT | Mod: CPTII,,, | Performed by: STUDENT IN AN ORGANIZED HEALTH CARE EDUCATION/TRAINING PROGRAM

## 2024-11-11 PROCEDURE — 3075F SYST BP GE 130 - 139MM HG: CPT | Mod: CPTII,,, | Performed by: STUDENT IN AN ORGANIZED HEALTH CARE EDUCATION/TRAINING PROGRAM

## 2024-11-11 PROCEDURE — 71046 X-RAY EXAM CHEST 2 VIEWS: CPT | Mod: TC

## 2024-11-11 PROCEDURE — 1159F MED LIST DOCD IN RCRD: CPT | Mod: CPTII,,, | Performed by: STUDENT IN AN ORGANIZED HEALTH CARE EDUCATION/TRAINING PROGRAM

## 2024-11-11 PROCEDURE — 99204 OFFICE O/P NEW MOD 45 MIN: CPT | Mod: S$PBB,,, | Performed by: STUDENT IN AN ORGANIZED HEALTH CARE EDUCATION/TRAINING PROGRAM

## 2024-11-11 RX ORDER — CYCLOBENZAPRINE HCL 5 MG
5 TABLET ORAL 3 TIMES DAILY PRN
Qty: 45 TABLET | Refills: 5 | Status: SHIPPED | OUTPATIENT
Start: 2024-11-11 | End: 2024-11-21

## 2024-11-11 RX ORDER — OXYCODONE AND ACETAMINOPHEN 5; 325 MG/1; MG/1
1 TABLET ORAL EVERY 4 HOURS PRN
Qty: 45 TABLET | Refills: 0 | Status: SHIPPED | OUTPATIENT
Start: 2024-11-11

## 2024-11-11 RX ORDER — PROMETHAZINE HYDROCHLORIDE 25 MG/1
25 TABLET ORAL EVERY 4 HOURS
Qty: 45 TABLET | Refills: 0 | Status: SHIPPED | OUTPATIENT
Start: 2024-11-11

## 2024-11-11 NOTE — PROGRESS NOTES
PCP: Kj Kramer MD    Referring Provider: Adriane Mata DO  [address] [facility] [place] [address]    Reason for referral: Perioperative cardiac risk assessment     Subjective:   Jonathan Rodriguez is a 49 y.o. female with hx of hypertension who presents for a new patient visit.    Patient is referred by Dr. Mata for perioperative cardiac risk assessment prior to C6-7 anterior cervical diskectomy and fusion (ACDF) with Dr. Ellis scheduled for this week on 11/14/24.  Patient complains of intermittent chest pain over the last 1 year. Notes chest pain is relatively infrequent and is associated with lifting heavy objects.  She also complains of rare episodes of chest tightness with heavy exertion.  She has not undergone any stress testing in the past.  Notes chronic mild dyspnea on exertion- which has been unchanged for years.    Fhx:  No family history of premature CAD in first-degree relatives  Shx: Never smoker     EKG 11/04/2024: Sinus bradycardia 53 beats per minute, anterolateral infarct, age undetermined    ECHO Results for orders placed during the hospital encounter of 12/08/23    Echo    Interpretation Summary    Left Ventricle: The left ventricle is normal in size. Increased wall thickness. Septal thickening. There is concentric hypertrophy. Normal wall motion. There is normal systolic function with a visually estimated ejection fraction of 60 - 65%. There is normal diastolic function.    Right Ventricle: Normal right ventricular cavity size. Systolic function is normal.    Aortic Valve: The aortic valve is a trileaflet valve.    Mitral Valve: There is mild regurgitation.    Tricuspid Valve: There is mild regurgitation.    Pulmonary Artery: The estimated pulmonary artery systolic pressure is 19 mmHg.    IVC/SVC: Normal venous pressure at 3 mmHg.     CATH: No results found for this or any previous visit.     Lab Results   Component Value Date     11/04/2024    K 3.4 (L) 11/04/2024      11/04/2024    CO2 30 11/04/2024    BUN 12 11/04/2024    CREATININE 1.12 (H) 11/04/2024    CALCIUM 9.1 11/04/2024    ANIONGAP 9 11/04/2024    EGFRNONAA 73 11/22/2021       Lab Results   Component Value Date    CHOL 142 11/15/2023    CHOL 164 05/03/2021     Lab Results   Component Value Date    HDL 47 11/15/2023    HDL 47 05/03/2021     Lab Results   Component Value Date    LDLCALC 75 11/15/2023    LDLCALC 102 05/03/2021     Lab Results   Component Value Date    TRIG 98 11/15/2023    TRIG 76 05/03/2021     Lab Results   Component Value Date    CHOLHDL 3.0 11/15/2023    CHOLHDL 3.5 05/03/2021       Lab Results   Component Value Date    WBC 11.09 (H) 11/04/2024    HGB 13.9 11/04/2024    HCT 44.2 11/04/2024    MCV 91.5 11/04/2024     11/04/2024           Current Outpatient Medications:     amLODIPine (NORVASC) 5 MG tablet, Take 1 tablet (5 mg total) by mouth once daily., Disp: 30 tablet, Rfl: 5    cyclobenzaprine (FLEXERIL) 5 MG tablet, Take 1 tablet (5 mg total) by mouth 3 (three) times daily as needed for Muscle spasms., Disp: 45 tablet, Rfl: 5    ketoconazole (NIZORAL) 2 % shampoo, Apply topically twice a week., Disp: 120 mL, Rfl: 2    losartan (COZAAR) 50 MG tablet, Take 1 tablet (50 mg total) by mouth once daily., Disp: 90 tablet, Rfl: 1    metoprolol tartrate (LOPRESSOR) 50 MG tablet, Take 1 tablet (50 mg total) by mouth 2 (two) times a day., Disp: 180 tablet, Rfl: 1    naproxen (NAPROSYN) 500 MG tablet, Take 1 tablet (500 mg total) by mouth 2 (two) times daily., Disp: 30 tablet, Rfl: 0    oxyCODONE-acetaminophen (PERCOCET) 5-325 mg per tablet, Take 1 tablet by mouth every 4 (four) hours as needed for Pain., Disp: 45 tablet, Rfl: 0    pantoprazole (PROTONIX) 40 MG tablet, Take 1 tablet (40 mg total) by mouth once daily., Disp: 90 tablet, Rfl: 1    pregabalin (LYRICA) 75 MG capsule, Take 1 capsule (75 mg total) by mouth 2 (two) times daily., Disp: 60 capsule, Rfl: 0    promethazine (PHENERGAN) 25 MG tablet,  "Take 1 tablet (25 mg total) by mouth every 4 (four) hours., Disp: 45 tablet, Rfl: 0    triamterene-hydrochlorothiazide 37.5-25 mg (MAXZIDE-25) 37.5-25 mg per tablet, Take 1 tablet by mouth Daily., Disp: 90 tablet, Rfl: 1    Review of Systems   Constitutional:  Negative for chills, diaphoresis, fever and malaise/fatigue.   Respiratory:  Positive for shortness of breath. Negative for cough.    Cardiovascular:  Positive for chest pain. Negative for palpitations, orthopnea, claudication, leg swelling and PND.   Gastrointestinal:  Negative for abdominal pain, heartburn, nausea and vomiting.   Neurological:  Negative for dizziness.       Objective:   BP (!) 130/98 (BP Location: Right arm, Patient Position: Sitting)   Pulse 62   Ht 5' 6" (1.676 m)   Wt 122.8 kg (270 lb 12.8 oz)   SpO2 98%   BMI 43.71 kg/m²     Physical Exam  Constitutional:       General: She is not in acute distress.     Appearance: Normal appearance.   Cardiovascular:      Rate and Rhythm: Normal rate and regular rhythm.      Pulses: Normal pulses.      Heart sounds: Normal heart sounds. No murmur heard.     No friction rub. No gallop.   Pulmonary:      Effort: Pulmonary effort is normal.      Breath sounds: Normal breath sounds. No wheezing or rales.   Musculoskeletal:      Right lower leg: No edema.      Left lower leg: No edema.   Skin:     General: Skin is warm and dry.   Neurological:      Mental Status: She is alert.           Assessment:     1. Pre-operative clearance  Ambulatory referral/consult to Cardiology            Plan:   No problem-specific Assessment & Plan notes found for this encounter.      Perioperative cardiac risk assessment  Chest pain  Complains of atypical chest pain when she is lifting heavy objects and infrequent episodes of chest tightness with heavy exertion  Discussed stress testing for further risk stratification, however, surgery is already scheduled 3 days from now and patient became very upset and tearful at the " prospect of having to reschedule her surgery (for cardiac testing) because she notes that she has already taken time off for surgery.  She feels her neck pain is very severe and needs to be addressed right away. She also notes that she has undergone cholecystectomy within the last 1 year following which she had no cardiac complications.   At least moderate cardiac risk. Patient understands risks.     Sinus bradycardia  Sinus bradycardia 53 bpm, in the setting of beta blocker use   Heart rate 62 beats per minute    Mild mitral regurgitation  Repeat echo 3 years from prior - tentatively December 2026    Follow-up in 3 months

## 2024-11-11 NOTE — PATIENT INSTRUCTIONS
Arrive to the ground floor of the AMBULATORY surgery building at  5:30AM  Do not eat or drink after midnight (this includes, gum, candy, chewing tobacco etc.)  Bring all medication in the original bottles.  Bring anything you may need for an overnight stay.  Bathe with Adeola-Hex the night or morning before your surgery.  The morning of you surgery ONLY take blood pressure medications, heart medications, medications for acid reflux and thyroid medications (if you are on any, that you take in the AM).  Take these medications with a sip of water.   Do NOT take any diabetic medications the AM of your surgery.  Be sure that you have stopped blood thinners at appropriate time, as instructed.  Bring your C-Pap machine if you have one.  All jewelry and piercings MUST be removed prior to surgery.   False eye lashes must be removed prior to surgery.    Prescriptions that we usually send in for after surgery are:    PERCOCET(OXYCODONE)- This is to be taken for pain.  FLEXERIL(CYCLOBENZAPRINE)- This is to be taken for muscle spasms/tightness  PHENERGAN- This medication is to be taken for nausea.      If you take any of the following medications STOP them ONE WEEK before surgery:  Ozempic,weqovy,rybelsus, Dulaglutide(Trulicity), Tiazepatide(Mounjaro), Bydurcon    If you take any of the following medications STOP them 24 hours PRIOR to your surgery:  Libraglutide (saxenda), Adlyxen (Lixixerutose), Keron Correa Victoza.  [email or web address]

## 2024-11-11 NOTE — H&P (VIEW-ONLY)
MDM/time:  40-45 minutes spent on this encounter including 10 minutes reviewing imaging and notes, 25 minutes with the patient, 5 minutes documentation    ASSESSMENT:  49 y.o. female with lumbar spondylosis with radiculopathy.  Cervical spondylosis and myeloradiculopathy    PLAN:  She would like to proceed with surgery for cervical spondylosis and myelopathy.  This will be a C6-7 ACDF    The final decision for surgery was made today during the office visit. The rationale, technique, recovery process, non-operative alternatives and potential complications associated with anterior cervical decompression and reconstruction (e.g. fusion, instrumentation or arthroplasty) were discussed with the patient in detail. The specific risks discussed included: medical/anesthetic complications, positioning palsy, infection, dural tear, epidural hematoma, wound hematoma, airway obstruction, voice change, swallowing difficulty, esophageal injury, major vascular injury with life threatening hemorrhage or stroke, thromboembolism , nonunion, instrumentation failure or malposition, quadriplegia due to spinal cord injury, bowel/bladder/sexual dysfunction, upper extremity weakness/sensory loss due to nerve root injury, adjacent segment degeneration, neck pain/stiffness, chronic arm pain, incomplete relief of preoperative symptoms and possible need for further surgery. All questions posed by the patient were answered. The surgical consent form was signed.     HPI:  49 y.o. female here for repeat evaluation of lower back pain that radiates into left hip and down the left leg.  Reports that lately she has had worsening neck pain and right upper extremity numbness.  She also has numbness in bilateral lower extremities.  Denies any recent injuries.  She does report hand dysfunction and balance difficulty.  She completed physical therapy after last visit with some improvement.  She has been doing her medically directed home exercise  program.  Patient reports pain has come and gone has pain approximately 10 years ago had to have injections and a rhizotomies that did give her pain relief.  Since February she has had increased pain with standing or sitting for any length of time.  No bladder or bowel incontinence.  Difficulty walking more than 10 minutes due to pain.  Currently takes Aleve as needed for pain.  No prior pain management.  No prior spine surgery.  Patient is not a smoker.    IMAGING:  X-rays lumbar spine reviewed show:  On the AP there is an oblique takeoff to the left from the lumbosacral junction.  There are 5 non-rib-bearing lumbar vertebrae.  On the lateral there is decreased lumbar lordosis.  There is spondylotic disease with decreased disc height and osteophyte formation noted.  No fractures or listhesis noted.  No instability on flexion-extension views.     X-ray cervical spine reviewed show:   On the AP there is normal coronal alignment.  There is uncovertebral and facet hypertrophy seen.  On the lateral there is loss of cervical lordosis.  There are spondylotic changes noted with decrease in disc height and osteophyte formation seen.  No fractures or listhesis noted.  No instability on flexion-extension views.    MRI cervical spine 10/18/2024 reviewed shows:   At C6-7 there is moderate central stenosis.  Moderate bilateral foraminal stenosis    Past Medical History:   Diagnosis Date    Anxiety disorder, unspecified     Asthma     GERD (gastroesophageal reflux disease)     Hypertension     Lumbar pain     Pseudoseizures     Unspecified osteoarthritis, unspecified site      Past Surgical History:   Procedure Laterality Date    bladder tack      COLONOSCOPY      HYSTERECTOMY      LAPAROSCOPIC CHOLECYSTECTOMY N/A 03/10/2023    Procedure: CHOLECYSTECTOMY, LAPAROSCOPIC;  Surgeon: Josue Ramirez MD;  Location: [facility];  Service: General;  Laterality: N/A;     Social History     Tobacco Use    Smoking status: Never    Smokeless  tobacco: Never   Substance Use Topics    Alcohol use: Never    Drug use: Never      Current Outpatient Medications   Medication Instructions    amLODIPine (NORVASC) 5 mg, Oral, Daily    ketoconazole (NIZORAL) 2 % shampoo Topical (Top), Twice weekly    losartan (COZAAR) 50 mg, Oral, Daily    metoprolol tartrate (LOPRESSOR) 50 mg, Oral, 2 times daily    naproxen (NAPROSYN) 500 mg, Oral, 2 times daily    pantoprazole (PROTONIX) 40 mg, Oral, Daily    pregabalin (LYRICA) 75 mg, Oral, 2 times daily    triamterene-hydrochlorothiazide 37.5-25 mg (MAXZIDE-25) 37.5-25 mg per tablet 1 tablet, Oral, Daily        EXAM:  Constitutional  General Appearance:  There is no height or weight on file to calculate BMI., NAD  Psychiatric   Orientation: Oriented to time, oriented to place, oriented to person  Mood and Affect: Active and alert, normal mood, normal affect  Gait and Station   Appearance:  Antalgic gait to the left, unable to tandem gait, unable to walk on toes, unable to walk on heels    LUMBAR  Musculoskeletal System   Hips: Normal appearance, no leg length discrepancy, normal motion; left, normal motion; right    Lumbar Spine                   Inspection:  Normal alignment, normal sagittal balance                  Range of motion:  Decreased flexion, extension, lateral bending, rotation. Pain with range of motion                  Bony Palpation of the Lumbar Spine:  No tenderness of the spinous process, no tenderness of the sacrum, no tenderness of the coccyx                  Bony Palpation of the Right Hip:  No tenderness of the iliac crest, no tenderness of the sciatic notch, no tenderness of the SI joint                  Bony Palpation of the Left Hip:  No tenderness of the iliac crest, no tenderness of the sciatic notch, no tenderness of the SI joint                  Soft Tissue Palpation on the Right:  No tenderness of the paraspinal region, no tenderness of the iliolumbar region                  Soft Tissue Palpation  on the Left:  No tenderness of the paraspinal region, no tenderness of the iliolumbar region    Motor Strength   L1 Right:  Hip flexion iliopsoas 5/5    L1 Left:  Hip flexion iliopsoas 5/5              L2-L4 Right:  Knee extension quadriceps 5/5, tibialis anterior 5/5              L2-L4 Left:  Knee extension quadriceps 5/5, tibialis anterior 5/5   L5 Right:  Extensor hallucis llongus 5/5,    L5 Left:  Extensor hallucis longus 5/5,    S1 Right:  Plantar flexion gastrocnemius 5/5   S1 Left:  Plantar flexion gastrocnemius 5/5    Neurological System   Ankle Reflex Right:  normal   Ankle Reflex Left: normal   Knee Reflex Right:  normal   Knee Reflex Left:  normal   Sensation on the Right:  L2 normal, L3 normal, L4 normal, L5 normal, S1 normal   Sensation on the Left:  L2 normal, L3 normal, L4 normal, L5 normal, S1 normal              Special Test on the Right:  Seated straight leg raising test negative, no clonus of the ankle              Special Test on the Left:  Seated straight leg raising test negative, no clonus of the ankle    Skin   Lumbosacral Spine:  Normal skin    Cardiovascular System   Arterial Pulses Right:  Posterior tibialis normal, dorsalis pedis normal   Arterial Pulses Left:  Posterior tibialis normal, dorsalis pedis normal   Edema Right: None   Edema Left:  None

## 2024-11-11 NOTE — PROGRESS NOTES
MDM/time:  40-45 minutes spent on this encounter including 10 minutes reviewing imaging and notes, 25 minutes with the patient, 5 minutes documentation    ASSESSMENT:  49 y.o. female with lumbar spondylosis with radiculopathy.  Cervical spondylosis and myeloradiculopathy    PLAN:  She would like to proceed with surgery for cervical spondylosis and myelopathy.  This will be a C6-7 ACDF    The final decision for surgery was made today during the office visit. The rationale, technique, recovery process, non-operative alternatives and potential complications associated with anterior cervical decompression and reconstruction (e.g. fusion, instrumentation or arthroplasty) were discussed with the patient in detail. The specific risks discussed included: medical/anesthetic complications, positioning palsy, infection, dural tear, epidural hematoma, wound hematoma, airway obstruction, voice change, swallowing difficulty, esophageal injury, major vascular injury with life threatening hemorrhage or stroke, thromboembolism , nonunion, instrumentation failure or malposition, quadriplegia due to spinal cord injury, bowel/bladder/sexual dysfunction, upper extremity weakness/sensory loss due to nerve root injury, adjacent segment degeneration, neck pain/stiffness, chronic arm pain, incomplete relief of preoperative symptoms and possible need for further surgery. All questions posed by the patient were answered. The surgical consent form was signed.     HPI:  49 y.o. female here for repeat evaluation of lower back pain that radiates into left hip and down the left leg.  Reports that lately she has had worsening neck pain and right upper extremity numbness.  She also has numbness in bilateral lower extremities.  Denies any recent injuries.  She does report hand dysfunction and balance difficulty.  She completed physical therapy after last visit with some improvement.  She has been doing her medically directed home exercise  program.  Patient reports pain has come and gone has pain approximately 10 years ago had to have injections and a rhizotomies that did give her pain relief.  Since February she has had increased pain with standing or sitting for any length of time.  No bladder or bowel incontinence.  Difficulty walking more than 10 minutes due to pain.  Currently takes Aleve as needed for pain.  No prior pain management.  No prior spine surgery.  Patient is not a smoker.    IMAGING:  X-rays lumbar spine reviewed show:  On the AP there is an oblique takeoff to the left from the lumbosacral junction.  There are 5 non-rib-bearing lumbar vertebrae.  On the lateral there is decreased lumbar lordosis.  There is spondylotic disease with decreased disc height and osteophyte formation noted.  No fractures or listhesis noted.  No instability on flexion-extension views.     X-ray cervical spine reviewed show:   On the AP there is normal coronal alignment.  There is uncovertebral and facet hypertrophy seen.  On the lateral there is loss of cervical lordosis.  There are spondylotic changes noted with decrease in disc height and osteophyte formation seen.  No fractures or listhesis noted.  No instability on flexion-extension views.    MRI cervical spine 10/18/2024 reviewed shows:   At C6-7 there is moderate central stenosis.  Moderate bilateral foraminal stenosis    Past Medical History:   Diagnosis Date    Anxiety disorder, unspecified     Asthma     GERD (gastroesophageal reflux disease)     Hypertension     Lumbar pain     Pseudoseizures     Unspecified osteoarthritis, unspecified site      Past Surgical History:   Procedure Laterality Date    bladder tack      COLONOSCOPY      HYSTERECTOMY      LAPAROSCOPIC CHOLECYSTECTOMY N/A 03/10/2023    Procedure: CHOLECYSTECTOMY, LAPAROSCOPIC;  Surgeon: Josue Ramirez MD;  Location: [facility];  Service: General;  Laterality: N/A;     Social History     Tobacco Use    Smoking status: Never    Smokeless  tobacco: Never   Substance Use Topics    Alcohol use: Never    Drug use: Never      Current Outpatient Medications   Medication Instructions    amLODIPine (NORVASC) 5 mg, Oral, Daily    ketoconazole (NIZORAL) 2 % shampoo Topical (Top), Twice weekly    losartan (COZAAR) 50 mg, Oral, Daily    metoprolol tartrate (LOPRESSOR) 50 mg, Oral, 2 times daily    naproxen (NAPROSYN) 500 mg, Oral, 2 times daily    pantoprazole (PROTONIX) 40 mg, Oral, Daily    pregabalin (LYRICA) 75 mg, Oral, 2 times daily    triamterene-hydrochlorothiazide 37.5-25 mg (MAXZIDE-25) 37.5-25 mg per tablet 1 tablet, Oral, Daily        EXAM:  Constitutional  General Appearance:  There is no height or weight on file to calculate BMI., NAD  Psychiatric   Orientation: Oriented to time, oriented to place, oriented to person  Mood and Affect: Active and alert, normal mood, normal affect  Gait and Station   Appearance:  Antalgic gait to the left, unable to tandem gait, unable to walk on toes, unable to walk on heels    LUMBAR  Musculoskeletal System   Hips: Normal appearance, no leg length discrepancy, normal motion; left, normal motion; right    Lumbar Spine                   Inspection:  Normal alignment, normal sagittal balance                  Range of motion:  Decreased flexion, extension, lateral bending, rotation. Pain with range of motion                  Bony Palpation of the Lumbar Spine:  No tenderness of the spinous process, no tenderness of the sacrum, no tenderness of the coccyx                  Bony Palpation of the Right Hip:  No tenderness of the iliac crest, no tenderness of the sciatic notch, no tenderness of the SI joint                  Bony Palpation of the Left Hip:  No tenderness of the iliac crest, no tenderness of the sciatic notch, no tenderness of the SI joint                  Soft Tissue Palpation on the Right:  No tenderness of the paraspinal region, no tenderness of the iliolumbar region                  Soft Tissue Palpation  on the Left:  No tenderness of the paraspinal region, no tenderness of the iliolumbar region    Motor Strength   L1 Right:  Hip flexion iliopsoas 5/5    L1 Left:  Hip flexion iliopsoas 5/5              L2-L4 Right:  Knee extension quadriceps 5/5, tibialis anterior 5/5              L2-L4 Left:  Knee extension quadriceps 5/5, tibialis anterior 5/5   L5 Right:  Extensor hallucis llongus 5/5,    L5 Left:  Extensor hallucis longus 5/5,    S1 Right:  Plantar flexion gastrocnemius 5/5   S1 Left:  Plantar flexion gastrocnemius 5/5    Neurological System   Ankle Reflex Right:  normal   Ankle Reflex Left: normal   Knee Reflex Right:  normal   Knee Reflex Left:  normal   Sensation on the Right:  L2 normal, L3 normal, L4 normal, L5 normal, S1 normal   Sensation on the Left:  L2 normal, L3 normal, L4 normal, L5 normal, S1 normal              Special Test on the Right:  Seated straight leg raising test negative, no clonus of the ankle              Special Test on the Left:  Seated straight leg raising test negative, no clonus of the ankle    Skin   Lumbosacral Spine:  Normal skin    Cardiovascular System   Arterial Pulses Right:  Posterior tibialis normal, dorsalis pedis normal   Arterial Pulses Left:  Posterior tibialis normal, dorsalis pedis normal   Edema Right: None   Edema Left:  None

## 2024-11-11 NOTE — TELEPHONE ENCOUNTER
Patient does need to keep appt with us so that if they do her cardiac clearance this afternoon she will be set up to continue with surgery on Thursday.,      ----- Message from Loretta sent at 11/11/2024  8:36 AM CST -----  Pt has 1115 appt for preop but her cardiac clearance isnt until 245 today. She is asking doesn she still need to come see yall at that time since she hasn't been cleared yet. Please let her know at [phone]  Who Called: Jonathan Rodriguez    Caller is requesting assistance/information from provider's office.    Patient's Preferred Phone Number on File: [phone]   Best Call Back Number, if different:  Additional Information:

## 2024-11-13 ENCOUNTER — PATIENT MESSAGE (OUTPATIENT)
Dept: SPINE | Facility: CLINIC | Age: 49
End: 2024-11-13
Payer: COMMERCIAL

## 2024-11-13 ENCOUNTER — TELEPHONE (OUTPATIENT)
Dept: SPINE | Facility: CLINIC | Age: 49
End: 2024-11-13
Payer: COMMERCIAL

## 2024-11-13 NOTE — TELEPHONE ENCOUNTER
Called patient, her insurance has not approved Surgery yet. Rescheduled to next Tuesday to allow more time for insurance approval.

## 2024-11-18 ENCOUNTER — PATIENT MESSAGE (OUTPATIENT)
Dept: SPINE | Facility: CLINIC | Age: 49
End: 2024-11-18
Payer: COMMERCIAL

## 2024-11-19 ENCOUNTER — HOSPITAL ENCOUNTER (INPATIENT)
Facility: HOSPITAL | Age: 49
LOS: 1 days | Discharge: HOME | DRG: 472 | End: 2024-11-20
Attending: ORTHOPAEDIC SURGERY | Admitting: ORTHOPAEDIC SURGERY
Payer: COMMERCIAL

## 2024-11-19 ENCOUNTER — ANESTHESIA EVENT (OUTPATIENT)
Dept: SURGERY | Facility: HOSPITAL | Age: 49
DRG: 472 | End: 2024-11-19
Payer: COMMERCIAL

## 2024-11-19 ENCOUNTER — ANESTHESIA (OUTPATIENT)
Dept: SURGERY | Facility: HOSPITAL | Age: 49
DRG: 472 | End: 2024-11-19
Payer: COMMERCIAL

## 2024-11-19 VITALS
OXYGEN SATURATION: 99 % | HEART RATE: 100 BPM | SYSTOLIC BLOOD PRESSURE: 153 MMHG | RESPIRATION RATE: 21 BRPM | DIASTOLIC BLOOD PRESSURE: 102 MMHG

## 2024-11-19 DIAGNOSIS — G89.18 ACUTE POST-OPERATIVE PAIN: Primary | ICD-10-CM

## 2024-11-19 DIAGNOSIS — G95.9 CERVICAL MYELOPATHY: ICD-10-CM

## 2024-11-19 DIAGNOSIS — E66.01 MORBID OBESITY: ICD-10-CM

## 2024-11-19 DIAGNOSIS — M54.12 CERVICAL RADICULOPATHY: ICD-10-CM

## 2024-11-19 DIAGNOSIS — I10 PRIMARY HYPERTENSION: ICD-10-CM

## 2024-11-19 DIAGNOSIS — G47.33 OSA (OBSTRUCTIVE SLEEP APNEA): ICD-10-CM

## 2024-11-19 PROCEDURE — 22551 ARTHRD ANT NTRBDY CERVICAL: CPT | Mod: ,,, | Performed by: ORTHOPAEDIC SURGERY

## 2024-11-19 PROCEDURE — 27000221 HC OXYGEN, UP TO 24 HOURS

## 2024-11-19 PROCEDURE — 36000710: Performed by: ORTHOPAEDIC SURGERY

## 2024-11-19 PROCEDURE — 25000003 PHARM REV CODE 250: Performed by: ORTHOPAEDIC SURGERY

## 2024-11-19 PROCEDURE — 63600175 PHARM REV CODE 636 W HCPCS: Performed by: NURSE ANESTHETIST, CERTIFIED REGISTERED

## 2024-11-19 PROCEDURE — 27000165 HC TUBE, ETT CUFFED: Performed by: NURSE ANESTHETIST, CERTIFIED REGISTERED

## 2024-11-19 PROCEDURE — 27000716 HC OXISENSOR PROBE, ANY SIZE: Performed by: NURSE ANESTHETIST, CERTIFIED REGISTERED

## 2024-11-19 PROCEDURE — C1769 GUIDE WIRE: HCPCS | Performed by: ORTHOPAEDIC SURGERY

## 2024-11-19 PROCEDURE — 25000003 PHARM REV CODE 250: Performed by: NURSE ANESTHETIST, CERTIFIED REGISTERED

## 2024-11-19 PROCEDURE — 99222 1ST HOSP IP/OBS MODERATE 55: CPT | Mod: ,,, | Performed by: INTERNAL MEDICINE

## 2024-11-19 PROCEDURE — 27000655: Performed by: NURSE ANESTHETIST, CERTIFIED REGISTERED

## 2024-11-19 PROCEDURE — 99900035 HC TECH TIME PER 15 MIN (STAT)

## 2024-11-19 PROCEDURE — 00NW0ZZ RELEASE CERVICAL SPINAL CORD, OPEN APPROACH: ICD-10-PCS | Performed by: ORTHOPAEDIC SURGERY

## 2024-11-19 PROCEDURE — 37000009 HC ANESTHESIA EA ADD 15 MINS: Performed by: ORTHOPAEDIC SURGERY

## 2024-11-19 PROCEDURE — 11000001 HC ACUTE MED/SURG PRIVATE ROOM

## 2024-11-19 PROCEDURE — 22853 INSJ BIOMECHANICAL DEVICE: CPT | Mod: ,,, | Performed by: ORTHOPAEDIC SURGERY

## 2024-11-19 PROCEDURE — 0RT30ZZ RESECTION OF CERVICAL VERTEBRAL DISC, OPEN APPROACH: ICD-10-PCS | Performed by: ORTHOPAEDIC SURGERY

## 2024-11-19 PROCEDURE — 27000689 HC BLADE LARYNGOSCOPE ANY SIZE: Performed by: NURSE ANESTHETIST, CERTIFIED REGISTERED

## 2024-11-19 PROCEDURE — 71000039 HC RECOVERY, EACH ADD'L HOUR: Performed by: ORTHOPAEDIC SURGERY

## 2024-11-19 PROCEDURE — C1713 ANCHOR/SCREW BN/BN,TIS/BN: HCPCS | Performed by: ORTHOPAEDIC SURGERY

## 2024-11-19 PROCEDURE — 0RG10A0 FUSION OF CERVICAL VERTEBRAL JOINT WITH INTERBODY FUSION DEVICE, ANTERIOR APPROACH, ANTERIOR COLUMN, OPEN APPROACH: ICD-10-PCS | Performed by: ORTHOPAEDIC SURGERY

## 2024-11-19 PROCEDURE — 22845 INSERT SPINE FIXATION DEVICE: CPT | Mod: 59,,, | Performed by: ORTHOPAEDIC SURGERY

## 2024-11-19 PROCEDURE — 22551 ARTHRD ANT NTRBDY CERVICAL: CPT | Mod: AS,,, | Performed by: NURSE PRACTITIONER

## 2024-11-19 PROCEDURE — 01N10ZZ RELEASE CERVICAL NERVE, OPEN APPROACH: ICD-10-PCS | Performed by: ORTHOPAEDIC SURGERY

## 2024-11-19 PROCEDURE — 63600175 PHARM REV CODE 636 W HCPCS: Performed by: ANESTHESIOLOGY

## 2024-11-19 PROCEDURE — 27201423 OPTIME MED/SURG SUP & DEVICES STERILE SUPPLY: Performed by: ORTHOPAEDIC SURGERY

## 2024-11-19 PROCEDURE — 36000711: Performed by: ORTHOPAEDIC SURGERY

## 2024-11-19 PROCEDURE — 27000510 HC BLANKET BAIR HUGGER ANY SIZE: Performed by: NURSE ANESTHETIST, CERTIFIED REGISTERED

## 2024-11-19 PROCEDURE — 94799 UNLISTED PULMONARY SVC/PX: CPT

## 2024-11-19 PROCEDURE — 22853 INSJ BIOMECHANICAL DEVICE: CPT | Mod: AS,,, | Performed by: NURSE PRACTITIONER

## 2024-11-19 PROCEDURE — 63600175 PHARM REV CODE 636 W HCPCS: Performed by: ORTHOPAEDIC SURGERY

## 2024-11-19 PROCEDURE — 27800903 OPTIME MED/SURG SUP & DEVICES OTHER IMPLANTS: Performed by: ORTHOPAEDIC SURGERY

## 2024-11-19 PROCEDURE — 97161 PT EVAL LOW COMPLEX 20 MIN: CPT

## 2024-11-19 PROCEDURE — 94761 N-INVAS EAR/PLS OXIMETRY MLT: CPT

## 2024-11-19 PROCEDURE — 20930 SP BONE ALGRFT MORSEL ADD-ON: CPT | Mod: ,,, | Performed by: ORTHOPAEDIC SURGERY

## 2024-11-19 PROCEDURE — 71000033 HC RECOVERY, INTIAL HOUR: Performed by: ORTHOPAEDIC SURGERY

## 2024-11-19 PROCEDURE — 37000008 HC ANESTHESIA 1ST 15 MINUTES: Performed by: ORTHOPAEDIC SURGERY

## 2024-11-19 PROCEDURE — 22845 INSERT SPINE FIXATION DEVICE: CPT | Mod: 59,AS,, | Performed by: NURSE PRACTITIONER

## 2024-11-19 PROCEDURE — 97165 OT EVAL LOW COMPLEX 30 MIN: CPT

## 2024-11-19 RX ORDER — LIDOCAINE HYDROCHLORIDE 20 MG/ML
INJECTION, SOLUTION EPIDURAL; INFILTRATION; INTRACAUDAL; PERINEURAL
Status: DISCONTINUED | OUTPATIENT
Start: 2024-11-19 | End: 2024-11-19

## 2024-11-19 RX ORDER — ONDANSETRON HYDROCHLORIDE 2 MG/ML
INJECTION, SOLUTION INTRAVENOUS
Status: DISCONTINUED | OUTPATIENT
Start: 2024-11-19 | End: 2024-11-19

## 2024-11-19 RX ORDER — TRIAMTERENE/HYDROCHLOROTHIAZID 37.5-25 MG
1 TABLET ORAL DAILY
Status: DISCONTINUED | OUTPATIENT
Start: 2024-11-20 | End: 2024-11-20 | Stop reason: HOSPADM

## 2024-11-19 RX ORDER — SODIUM CHLORIDE 9 MG/ML
INJECTION, SOLUTION INTRAVENOUS CONTINUOUS
Status: DISCONTINUED | OUTPATIENT
Start: 2024-11-19 | End: 2024-11-19

## 2024-11-19 RX ORDER — MEPERIDINE HYDROCHLORIDE 25 MG/ML
25 INJECTION INTRAMUSCULAR; INTRAVENOUS; SUBCUTANEOUS EVERY 10 MIN PRN
Status: DISCONTINUED | OUTPATIENT
Start: 2024-11-19 | End: 2024-11-19 | Stop reason: HOSPADM

## 2024-11-19 RX ORDER — PANTOPRAZOLE SODIUM 40 MG/1
40 TABLET, DELAYED RELEASE ORAL DAILY
Status: DISCONTINUED | OUTPATIENT
Start: 2024-11-20 | End: 2024-11-20 | Stop reason: HOSPADM

## 2024-11-19 RX ORDER — VANCOMYCIN HYDROCHLORIDE 1 G/20ML
INJECTION, POWDER, LYOPHILIZED, FOR SOLUTION INTRAVENOUS
Status: DISCONTINUED | OUTPATIENT
Start: 2024-11-19 | End: 2024-11-19 | Stop reason: HOSPADM

## 2024-11-19 RX ORDER — ROCURONIUM BROMIDE 10 MG/ML
INJECTION, SOLUTION INTRAVENOUS
Status: DISCONTINUED | OUTPATIENT
Start: 2024-11-19 | End: 2024-11-19

## 2024-11-19 RX ORDER — SODIUM CHLORIDE 9 MG/ML
INJECTION, SOLUTION INTRAVENOUS
Status: COMPLETED | OUTPATIENT
Start: 2024-11-19 | End: 2024-11-19

## 2024-11-19 RX ORDER — CELECOXIB 100 MG/1
200 CAPSULE ORAL ONCE
Status: COMPLETED | OUTPATIENT
Start: 2024-11-19 | End: 2024-11-19

## 2024-11-19 RX ORDER — IPRATROPIUM BROMIDE AND ALBUTEROL SULFATE 2.5; .5 MG/3ML; MG/3ML
3 SOLUTION RESPIRATORY (INHALATION) ONCE
Status: DISCONTINUED | OUTPATIENT
Start: 2024-11-19 | End: 2024-11-19 | Stop reason: HOSPADM

## 2024-11-19 RX ORDER — MUPIROCIN 20 MG/G
OINTMENT TOPICAL
Status: DISCONTINUED | OUTPATIENT
Start: 2024-11-19 | End: 2024-11-19 | Stop reason: HOSPADM

## 2024-11-19 RX ORDER — CYCLOBENZAPRINE HCL 5 MG
5 TABLET ORAL 3 TIMES DAILY PRN
Status: DISCONTINUED | OUTPATIENT
Start: 2024-11-19 | End: 2024-11-20 | Stop reason: HOSPADM

## 2024-11-19 RX ORDER — VECURONIUM BROMIDE 1 MG/ML
INJECTION, POWDER, LYOPHILIZED, FOR SOLUTION INTRAVENOUS
Status: DISCONTINUED | OUTPATIENT
Start: 2024-11-19 | End: 2024-11-19

## 2024-11-19 RX ORDER — PROPOFOL 10 MG/ML
VIAL (ML) INTRAVENOUS
Status: DISCONTINUED | OUTPATIENT
Start: 2024-11-19 | End: 2024-11-19

## 2024-11-19 RX ORDER — OXYCODONE HCL 10 MG/1
10 TABLET, FILM COATED, EXTENDED RELEASE ORAL ONCE
Status: DISCONTINUED | OUTPATIENT
Start: 2024-11-19 | End: 2024-11-19

## 2024-11-19 RX ORDER — OXYCODONE HCL 10 MG/1
10 TABLET, FILM COATED, EXTENDED RELEASE ORAL ONCE
Status: COMPLETED | OUTPATIENT
Start: 2024-11-19 | End: 2024-11-19

## 2024-11-19 RX ORDER — LOSARTAN POTASSIUM 50 MG/1
50 TABLET ORAL DAILY
Status: DISCONTINUED | OUTPATIENT
Start: 2024-11-20 | End: 2024-11-20 | Stop reason: HOSPADM

## 2024-11-19 RX ORDER — HYDROMORPHONE HYDROCHLORIDE 2 MG/ML
0.5 INJECTION, SOLUTION INTRAMUSCULAR; INTRAVENOUS; SUBCUTANEOUS EVERY 5 MIN PRN
Status: COMPLETED | OUTPATIENT
Start: 2024-11-19 | End: 2024-11-19

## 2024-11-19 RX ORDER — METOPROLOL TARTRATE 50 MG/1
50 TABLET ORAL 2 TIMES DAILY
Status: DISCONTINUED | OUTPATIENT
Start: 2024-11-19 | End: 2024-11-20 | Stop reason: HOSPADM

## 2024-11-19 RX ORDER — OXYCODONE HYDROCHLORIDE 5 MG/1
5 TABLET ORAL
Status: DISCONTINUED | OUTPATIENT
Start: 2024-11-19 | End: 2024-11-20 | Stop reason: HOSPADM

## 2024-11-19 RX ORDER — FAMOTIDINE 20 MG/1
20 TABLET, FILM COATED ORAL 2 TIMES DAILY
Status: DISCONTINUED | OUTPATIENT
Start: 2024-11-19 | End: 2024-11-20 | Stop reason: HOSPADM

## 2024-11-19 RX ORDER — MUPIROCIN 20 MG/G
OINTMENT TOPICAL 2 TIMES DAILY
Status: DISCONTINUED | OUTPATIENT
Start: 2024-11-19 | End: 2024-11-20 | Stop reason: HOSPADM

## 2024-11-19 RX ORDER — TRANEXAMIC ACID 10 MG/ML
1000 INJECTION, SOLUTION INTRAVENOUS
Status: DISCONTINUED | OUTPATIENT
Start: 2024-11-19 | End: 2024-11-19 | Stop reason: HOSPADM

## 2024-11-19 RX ORDER — TRANEXAMIC ACID 100 MG/ML
INJECTION, SOLUTION INTRAVENOUS
Status: DISCONTINUED | OUTPATIENT
Start: 2024-11-19 | End: 2024-11-19

## 2024-11-19 RX ORDER — PREGABALIN 75 MG/1
75 CAPSULE ORAL 2 TIMES DAILY
Status: DISCONTINUED | OUTPATIENT
Start: 2024-11-19 | End: 2024-11-20 | Stop reason: HOSPADM

## 2024-11-19 RX ORDER — ONDANSETRON HYDROCHLORIDE 2 MG/ML
4 INJECTION, SOLUTION INTRAVENOUS DAILY PRN
Status: DISCONTINUED | OUTPATIENT
Start: 2024-11-19 | End: 2024-11-19 | Stop reason: HOSPADM

## 2024-11-19 RX ORDER — CEFAZOLIN 2 G/1
2 INJECTION, POWDER, FOR SOLUTION INTRAMUSCULAR; INTRAVENOUS
Status: DISCONTINUED | OUTPATIENT
Start: 2024-11-19 | End: 2024-11-19 | Stop reason: HOSPADM

## 2024-11-19 RX ORDER — DEXAMETHASONE SODIUM PHOSPHATE 4 MG/ML
INJECTION, SOLUTION INTRA-ARTICULAR; INTRALESIONAL; INTRAMUSCULAR; INTRAVENOUS; SOFT TISSUE
Status: DISCONTINUED | OUTPATIENT
Start: 2024-11-19 | End: 2024-11-19

## 2024-11-19 RX ORDER — DEXAMETHASONE SODIUM PHOSPHATE 4 MG/ML
8 INJECTION, SOLUTION INTRA-ARTICULAR; INTRALESIONAL; INTRAMUSCULAR; INTRAVENOUS; SOFT TISSUE EVERY 6 HOURS
Status: COMPLETED | OUTPATIENT
Start: 2024-11-19 | End: 2024-11-20

## 2024-11-19 RX ORDER — MORPHINE SULFATE 10 MG/ML
4 INJECTION INTRAMUSCULAR; INTRAVENOUS; SUBCUTANEOUS EVERY 5 MIN PRN
Status: DISCONTINUED | OUTPATIENT
Start: 2024-11-19 | End: 2024-11-19 | Stop reason: HOSPADM

## 2024-11-19 RX ORDER — ACETAMINOPHEN 500 MG
500 TABLET ORAL EVERY 4 HOURS
Status: DISCONTINUED | OUTPATIENT
Start: 2024-11-19 | End: 2024-11-20 | Stop reason: HOSPADM

## 2024-11-19 RX ORDER — METOCLOPRAMIDE HYDROCHLORIDE 5 MG/ML
5 INJECTION INTRAMUSCULAR; INTRAVENOUS EVERY 6 HOURS PRN
Status: DISCONTINUED | OUTPATIENT
Start: 2024-11-19 | End: 2024-11-20 | Stop reason: HOSPADM

## 2024-11-19 RX ORDER — DIPHENHYDRAMINE HYDROCHLORIDE 50 MG/ML
25 INJECTION INTRAMUSCULAR; INTRAVENOUS EVERY 6 HOURS PRN
Status: DISCONTINUED | OUTPATIENT
Start: 2024-11-19 | End: 2024-11-19 | Stop reason: HOSPADM

## 2024-11-19 RX ORDER — MORPHINE SULFATE 2 MG/ML
2 INJECTION, SOLUTION INTRAMUSCULAR; INTRAVENOUS
Status: DISCONTINUED | OUTPATIENT
Start: 2024-11-19 | End: 2024-11-20 | Stop reason: HOSPADM

## 2024-11-19 RX ORDER — CEFAZOLIN 2 G/1
2 INJECTION, POWDER, FOR SOLUTION INTRAMUSCULAR; INTRAVENOUS
Status: COMPLETED | OUTPATIENT
Start: 2024-11-19 | End: 2024-11-20

## 2024-11-19 RX ORDER — DOCUSATE SODIUM 100 MG/1
100 CAPSULE, LIQUID FILLED ORAL 2 TIMES DAILY
Status: DISCONTINUED | OUTPATIENT
Start: 2024-11-19 | End: 2024-11-20 | Stop reason: HOSPADM

## 2024-11-19 RX ORDER — TRIAMTERENE/HYDROCHLOROTHIAZID 37.5-25 MG
1 TABLET ORAL DAILY
Status: DISCONTINUED | OUTPATIENT
Start: 2024-11-19 | End: 2024-11-19

## 2024-11-19 RX ORDER — EPINEPHRINE 1 MG/ML
INJECTION INTRAMUSCULAR; INTRAVENOUS; SUBCUTANEOUS
Status: DISCONTINUED | OUTPATIENT
Start: 2024-11-19 | End: 2024-11-19 | Stop reason: HOSPADM

## 2024-11-19 RX ORDER — ONDANSETRON HYDROCHLORIDE 2 MG/ML
4 INJECTION, SOLUTION INTRAVENOUS EVERY 12 HOURS PRN
Status: DISCONTINUED | OUTPATIENT
Start: 2024-11-19 | End: 2024-11-20 | Stop reason: HOSPADM

## 2024-11-19 RX ORDER — CEFAZOLIN SODIUM 1 G/3ML
INJECTION, POWDER, FOR SOLUTION INTRAMUSCULAR; INTRAVENOUS
Status: DISCONTINUED | OUTPATIENT
Start: 2024-11-19 | End: 2024-11-19

## 2024-11-19 RX ORDER — AMLODIPINE BESYLATE 5 MG/1
5 TABLET ORAL DAILY
Status: DISCONTINUED | OUTPATIENT
Start: 2024-11-20 | End: 2024-11-20 | Stop reason: HOSPADM

## 2024-11-19 RX ORDER — GLYCOPYRROLATE 0.2 MG/ML
INJECTION INTRAMUSCULAR; INTRAVENOUS
Status: DISCONTINUED | OUTPATIENT
Start: 2024-11-19 | End: 2024-11-19

## 2024-11-19 RX ORDER — MIDAZOLAM HYDROCHLORIDE 1 MG/ML
INJECTION INTRAMUSCULAR; INTRAVENOUS
Status: DISCONTINUED | OUTPATIENT
Start: 2024-11-19 | End: 2024-11-19

## 2024-11-19 RX ORDER — AMOXICILLIN 250 MG
1 CAPSULE ORAL 2 TIMES DAILY
Status: DISCONTINUED | OUTPATIENT
Start: 2024-11-19 | End: 2024-11-20 | Stop reason: HOSPADM

## 2024-11-19 RX ORDER — OXYCODONE HYDROCHLORIDE 5 MG/1
10 TABLET ORAL EVERY 4 HOURS PRN
Status: DISCONTINUED | OUTPATIENT
Start: 2024-11-19 | End: 2024-11-20 | Stop reason: HOSPADM

## 2024-11-19 RX ORDER — FENTANYL CITRATE 50 UG/ML
INJECTION, SOLUTION INTRAMUSCULAR; INTRAVENOUS
Status: DISCONTINUED | OUTPATIENT
Start: 2024-11-19 | End: 2024-11-19

## 2024-11-19 RX ORDER — SODIUM CHLORIDE 0.9 % (FLUSH) 0.9 %
10 SYRINGE (ML) INJECTION
Status: DISCONTINUED | OUTPATIENT
Start: 2024-11-19 | End: 2024-11-20 | Stop reason: HOSPADM

## 2024-11-19 RX ORDER — POLYETHYLENE GLYCOL 3350 17 G/17G
17 POWDER, FOR SOLUTION ORAL DAILY
Status: DISCONTINUED | OUTPATIENT
Start: 2024-11-19 | End: 2024-11-20 | Stop reason: HOSPADM

## 2024-11-19 RX ADMIN — SUGAMMADEX 100 MG: 100 INJECTION, SOLUTION INTRAVENOUS at 02:11

## 2024-11-19 RX ADMIN — ACETAMINOPHEN 500 MG: 500 TABLET ORAL at 05:11

## 2024-11-19 RX ADMIN — CEFAZOLIN 3 G: 1 INJECTION, POWDER, FOR SOLUTION INTRAMUSCULAR; INTRAVENOUS; PARENTERAL at 12:11

## 2024-11-19 RX ADMIN — FENTANYL CITRATE 100 MCG: 50 INJECTION, SOLUTION INTRAMUSCULAR; INTRAVENOUS at 01:11

## 2024-11-19 RX ADMIN — ACETAMINOPHEN 500 MG: 500 TABLET ORAL at 09:11

## 2024-11-19 RX ADMIN — FAMOTIDINE 20 MG: 20 TABLET, FILM COATED ORAL at 09:11

## 2024-11-19 RX ADMIN — OXYCODONE 5 MG: 5 TABLET ORAL at 05:11

## 2024-11-19 RX ADMIN — METOPROLOL TARTRATE 50 MG: 50 TABLET, FILM COATED ORAL at 09:11

## 2024-11-19 RX ADMIN — OXYCODONE 5 MG: 5 TABLET ORAL at 09:11

## 2024-11-19 RX ADMIN — ROCURONIUM BROMIDE 50 MG: 10 INJECTION, SOLUTION INTRAVENOUS at 12:11

## 2024-11-19 RX ADMIN — TRANEXAMIC ACID 500 MG: 100 INJECTION, SOLUTION INTRAVENOUS at 01:11

## 2024-11-19 RX ADMIN — OXYCODONE HYDROCHLORIDE 10 MG: 10 TABLET, FILM COATED, EXTENDED RELEASE ORAL at 09:11

## 2024-11-19 RX ADMIN — SUGAMMADEX 400 MG: 100 INJECTION, SOLUTION INTRAVENOUS at 02:11

## 2024-11-19 RX ADMIN — DEXAMETHASONE SODIUM PHOSPHATE 8 MG: 4 INJECTION, SOLUTION INTRA-ARTICULAR; INTRALESIONAL; INTRAMUSCULAR; INTRAVENOUS; SOFT TISSUE at 05:11

## 2024-11-19 RX ADMIN — HYDROMORPHONE HYDROCHLORIDE 0.5 MG: 2 INJECTION INTRAMUSCULAR; INTRAVENOUS; SUBCUTANEOUS at 02:11

## 2024-11-19 RX ADMIN — HYDROMORPHONE HYDROCHLORIDE 0.5 MG: 2 INJECTION INTRAMUSCULAR; INTRAVENOUS; SUBCUTANEOUS at 03:11

## 2024-11-19 RX ADMIN — DOCUSATE SODIUM 100 MG: 100 CAPSULE, LIQUID FILLED ORAL at 09:11

## 2024-11-19 RX ADMIN — ONDANSETRON 4 MG: 2 INJECTION INTRAMUSCULAR; INTRAVENOUS at 12:11

## 2024-11-19 RX ADMIN — SENNOSIDES AND DOCUSATE SODIUM 1 TABLET: 50; 8.6 TABLET ORAL at 09:11

## 2024-11-19 RX ADMIN — CELECOXIB 200 MG: 100 CAPSULE ORAL at 09:11

## 2024-11-19 RX ADMIN — SODIUM CHLORIDE: 9 INJECTION, SOLUTION INTRAVENOUS at 12:11

## 2024-11-19 RX ADMIN — DEXAMETHASONE SODIUM PHOSPHATE 16 MG: 4 INJECTION, SOLUTION INTRA-ARTICULAR; INTRALESIONAL; INTRAMUSCULAR; INTRAVENOUS; SOFT TISSUE at 01:11

## 2024-11-19 RX ADMIN — CEFAZOLIN 2 G: 2 INJECTION, POWDER, FOR SOLUTION INTRAMUSCULAR; INTRAVENOUS at 05:11

## 2024-11-19 RX ADMIN — VECURONIUM BROMIDE 3 MG: 1 INJECTION, POWDER, LYOPHILIZED, FOR SOLUTION INTRAVENOUS at 12:11

## 2024-11-19 RX ADMIN — GLYCOPYRROLATE 0.2 MG: 0.2 INJECTION INTRAMUSCULAR; INTRAVENOUS at 12:11

## 2024-11-19 RX ADMIN — LIDOCAINE HYDROCHLORIDE 80 MG: 20 INJECTION, SOLUTION EPIDURAL; INFILTRATION; INTRACAUDAL; PERINEURAL at 12:11

## 2024-11-19 RX ADMIN — MIDAZOLAM HYDROCHLORIDE 2 MG: 1 INJECTION, SOLUTION INTRAMUSCULAR; INTRAVENOUS at 12:11

## 2024-11-19 RX ADMIN — PREGABALIN 75 MG: 75 CAPSULE ORAL at 09:11

## 2024-11-19 RX ADMIN — PROPOFOL 180 MG: 10 INJECTION, EMULSION INTRAVENOUS at 12:11

## 2024-11-19 RX ADMIN — POLYETHYLENE GLYCOL 3350 17 G: 17 POWDER, FOR SOLUTION ORAL at 05:11

## 2024-11-19 RX ADMIN — FENTANYL CITRATE 100 MCG: 50 INJECTION, SOLUTION INTRAMUSCULAR; INTRAVENOUS at 12:11

## 2024-11-19 RX ADMIN — TRANEXAMIC ACID 500 MG: 100 INJECTION, SOLUTION INTRAVENOUS at 12:11

## 2024-11-19 RX ADMIN — MUPIROCIN: 20 OINTMENT TOPICAL at 09:11

## 2024-11-19 NOTE — PT/OT/SLP EVAL
Occupational Therapy   Evaluation    Name: Jonathan Rodriguez  MRN: [id number]  Admitting Diagnosis: Cervical myelopathy  Recent Surgery: Procedure(s) (LRB):  C6-C7 ACDF (N/A) Day of Surgery    Recommendations:     Discharge Recommendations: No Therapy Indicated  Discharge Equipment Recommendations:  none  Barriers to discharge:  None    Assessment:     Jonathan Rodriguez is a 49 y.o. female with a medical diagnosis of Cervical myelopathy.  She presents with alert and agreeable to evaluation. Performance deficits affecting function: weakness, impaired balance, impaired fine motor, orthopedic precautions, impaired self care skills.      Rehab Prognosis: Good; patient would benefit from acute skilled OT services to address these deficits and reach maximum level of function.       Plan:     Patient to be seen 5 x/week to address the above listed problems via self-care/home management, therapeutic activities, therapeutic exercises  Plan of Care Expires: 12/17/24  Plan of Care Reviewed with: patient    Subjective     Chief Complaint: Cervical myelopathy    Patient/Family Comments/goals: Pt plans to return home with her family    Occupational Profile:  Living Environment: Pt lives in a single level home with 5 steps with a rail  Previous level of function: Pt is independent at baseline with all ADL/ IADL and mobility  Roles and Routines: Pt is  and works at [employer]  Equipment Used at Home: CPAP  Assistance upon Discharge: Pt will have assistance from family    Pain/Comfort:  Pain Rating 1: 0/10  Pain Rating Post-Intervention 1: 0/10    Patients cultural, spiritual, Sikh conflicts given the current situation: no    Objective:     Communicated with: DM Roberson prior to session.  Patient found HOB elevated with cervical collar, pulse ox (continuous), perineural catheter, telemetry, blood pressure cuff upon OT entry to room.    General Precautions: Standard, fall  Orthopedic Precautions: spinal precautions  Braces: Cervical  collar  Respiratory Status: Room air    Occupational Performance:    Bed Mobility:    Patient completed Supine to Sit with minimum assistance  Patient completed Sit to Supine with minimum assistance    Functional Mobility/Transfers:  Patient completed Sit <> Stand Transfer with minimum assistance  with  hand-held assist   Functional Mobility: Pt ambulated with min(A) handheld assist x 2 persons due to decreased balance     Activities of Daily Living:  Upper Body Dressing: minimum assistance for pull over shirt  Lower Body Dressing: minimum assistance for pants    Cognitive/Visual Perceptual:  Cognitive/Psychosocial Skills:     -       Oriented to: Person, Place, and Situation   -       Follows Commands/attention:Follows one-step commands  -       Safety awareness/insight to disability: intact   -       Mood/Affect/Coping skills/emotional control: Cooperative  Visual/Perceptual:      -wears glasses      Physical Exam:  Balance:    -       sitting with supervision, standing with CGA to min(A)  Sensation:    -       Impaired  tingling affecting her (R) UE and (B) LE  Dominant hand:    -       Right  Upper Extremity Range of Motion:     -       Right Upper Extremity: WNL  -       Left Upper Extremity: WFL  Upper Extremity Strength:    -       Right Upper Extremity: WFL  -       Left Upper Extremity: WFL   Strength:    -       Right Upper Extremity: WFL  -       Left Upper Extremity: WFL  Fine Motor Coordination:    -       Impaired  Left hand, manipulation of objects   and Right hand, manipulation of objects    Gross motor coordination:   WFL    AMPAC 6 Click ADL:  AMPAC Total Score: 21    Treatment & Education:  Pt educated on OT role/POC.   Importance of OOB activity with staff assistance.  Importance of sitting up in the chair throughout the day as tolerated, especially for meals   Safety during functional t/f and mobility with use of staff assistance  Importance of assisting with self-care activities   All  questions/concerns answered within OT scope of practice      Patient left HOB elevated with all lines intact, call button in reach, and RN notified    GOALS:   Multidisciplinary Problems       Occupational Therapy Goals          Problem: Occupational Therapy    Goal Priority Disciplines Outcome Interventions   Occupational Therapy Goal     OT, PT/OT Progressing    Description: STG: (in 1 week)  Pt will perform grooming with supervision  Pt will bathe with min(A)  Pt will perform UE dressing with CGA  Pt will perform LE dressing with CGA  Pt will transfer bed/chair/bsc with CGA  Pt will perform standing task x 3 min with SBA   Pt will tolerate 15 minutes of tx without fatigue      LTG: (in 5 weeks)  1.Restore to max I with self care and mobility.                         History:     Past Medical History:   Diagnosis Date    Anxiety disorder, unspecified     Asthma     GERD (gastroesophageal reflux disease)     Hypertension     Lumbar pain     Pseudoseizures     Unspecified osteoarthritis, unspecified site          Past Surgical History:   Procedure Laterality Date    bladder tack      COLONOSCOPY      HYSTERECTOMY      LAPAROSCOPIC CHOLECYSTECTOMY N/A 03/10/2023    Procedure: CHOLECYSTECTOMY, LAPAROSCOPIC;  Surgeon: Josue Ramirez MD;  Location: [facility];  Service: General;  Laterality: N/A;       Time Tracking:     OT Date of Treatment: 11/19/24  OT Start Time: 1629  OT Stop Time: 1647  OT Total Time (min): 18 min    Billable Minutes:Evaluation low complexity    11/19/2024

## 2024-11-19 NOTE — CONSULTS
[facility]  [facility] Medicine  Consult Note    Patient Name: Jonathan Rodriguez  MRN: [id number]  Admission Date: 11/19/2024  Hospital Length of Stay: 0 days  Attending Physician: Kulwant Ellis MD   Primary Care Provider: Kj Kramer MD           Patient information was obtained from patient and past medical records.     Inpatient consult to Hospitalist  Consult performed by: Deejay Donahue MD  Consult ordered by: Kulwant Ellis MD        Subjective:     Principal Problem: Cervical myelopathy    Chief Complaint: No chief complaint on file.       HPI: Ms. Rodriguez is a 49 Y O female with PMH of HTN, DENTON on CPAP who presented for elective cervical spine surgery with Dr. Ellis. Patient was seen post operatively. She denies chest pain, palpitations, shortness of breath, nausea or vomiting. She is compliant with her home medications.     Past Medical History:   Diagnosis Date    Anxiety disorder, unspecified     Asthma     GERD (gastroesophageal reflux disease)     Hypertension     Lumbar pain     Pseudoseizures     Unspecified osteoarthritis, unspecified site        Past Surgical History:   Procedure Laterality Date    bladder tack      COLONOSCOPY      HYSTERECTOMY      LAPAROSCOPIC CHOLECYSTECTOMY N/A 03/10/2023    Procedure: CHOLECYSTECTOMY, LAPAROSCOPIC;  Surgeon: Josue Ramirez MD;  Location: [facility] OR;  Service: General;  Laterality: N/A;       Review of patient's allergies indicates:   Allergen Reactions    Gabapentin Shortness Of Breath    Lisinopril Other (See Comments)     cough    Aspirin Nausea And Vomiting       No current facility-administered medications on file prior to encounter.     Current Outpatient Medications on File Prior to Encounter   Medication Sig    amLODIPine (NORVASC) 5 MG tablet Take 1 tablet (5 mg total) by mouth once daily.    losartan (COZAAR) 50 MG tablet Take 1 tablet (50 mg total) by mouth once daily.    metoprolol tartrate (LOPRESSOR) 50 MG tablet Take 1  tablet (50 mg total) by mouth 2 (two) times a day.    pantoprazole (PROTONIX) 40 MG tablet Take 1 tablet (40 mg total) by mouth once daily.    ketoconazole (NIZORAL) 2 % shampoo Apply topically twice a week.    naproxen (NAPROSYN) 500 MG tablet Take 1 tablet (500 mg total) by mouth 2 (two) times daily.    pregabalin (LYRICA) 75 MG capsule Take 1 capsule (75 mg total) by mouth 2 (two) times daily.    triamterene-hydrochlorothiazide 37.5-25 mg (MAXZIDE-25) 37.5-25 mg per tablet Take 1 tablet by mouth Daily.     Family History       Problem Relation (Age of Onset)    Cancer Paternal Aunt, Paternal Uncle    Diabetes Mother, Father, Maternal Aunt, Maternal Grandfather    Heart disease Maternal Grandmother, Maternal Grandfather, Paternal Grandmother, Paternal Grandfather    Hypertension Mother, Sister          Tobacco Use    Smoking status: Never    Smokeless tobacco: Never   Substance and Sexual Activity    Alcohol use: Never    Drug use: Never    Sexual activity: Yes     Review of Systems   All other systems reviewed and are negative.    Objective:     Vital Signs (Most Recent):  Temp: 97.5 °F (36.4 °C) (11/19/24 1557)  Pulse: (!) 55 (11/19/24 1557)  Resp: 16 (11/19/24 1530)  BP: (!) 145/76 (11/19/24 1557)  SpO2: 95 % (11/19/24 1557) Vital Signs (24h Range):  Temp:  [97.5 °F (36.4 °C)-98.2 °F (36.8 °C)] 97.5 °F (36.4 °C)  Pulse:  [] 55  Resp:  [14-26] 16  SpO2:  [93 %-100 %] 95 %  BP: (123-159)/() 145/76     Weight: 122.8 kg (270 lb 11.6 oz)  Body mass index is 43.72 kg/m².     Physical Exam  Vitals and nursing note reviewed.   Constitutional:       Appearance: Normal appearance. She is obese.   HENT:      Head: Normocephalic and atraumatic.      Mouth/Throat:      Mouth: Mucous membranes are moist.   Eyes:      Extraocular Movements: Extraocular movements intact.      Pupils: Pupils are equal, round, and reactive to light.   Neck:      Comments: C-collar in place.   Cardiovascular:      Rate and Rhythm:  Normal rate and regular rhythm.   Pulmonary:      Effort: Pulmonary effort is normal.      Breath sounds: Normal breath sounds.   Abdominal:      General: Bowel sounds are normal.      Palpations: Abdomen is soft.   Musculoskeletal:         General: Normal range of motion.   Neurological:      General: No focal deficit present.      Mental Status: She is alert and oriented to person, place, and time. Mental status is at baseline.   Psychiatric:         Mood and Affect: Mood normal.         Behavior: Behavior normal.          Significant Labs: All pertinent labs within the past 24 hours have been reviewed.    Significant Imaging: I have reviewed all pertinent imaging results/findings within the past 24 hours.  Assessment/Plan:     * Cervical myelopathy  S/p ACDF C6-C7 by Dr. Ellis on 11/19.  Pain control.  PT/OT consulted.       Cervical radiculopathy  As above.       Acute post-operative pain  Pain control with oxycodone and morphine per primary.       Morbid obesity  Body mass index is 43.72 kg/m². Morbid obesity complicates all aspects of disease management from diagnostic modalities to treatment. Weight loss encouraged and health benefits explained to patient.         DENTON (obstructive sleep apnea)  Resume home CPAP      Primary hypertension  Patients blood pressure range in the last 24 hours was: BP  Min: 123/72  Max: 159/100.The patient's inpatient anti-hypertensive regimen is listed below:  Current Antihypertensives  amLODIPine tablet 5 mg, Daily, Oral  losartan tablet 50 mg, Daily, Oral  metoprolol tartrate (LOPRESSOR) tablet 50 mg, 2 times daily, Oral  triamterene-hydrochlorothiazide 37.5-25 mg per tablet 1 tablet, Daily, Oral    Plan  - BP is controlled, no changes needed to their regimen      VTE Risk Mitigation (From admission, onward)           Ordered     Place KARY hose  Until discontinued         11/19/24 0843     Place sequential compression device  Until discontinued         11/19/24 0843                         Thank you for your consult. I will follow-up with patient. Please contact us if you have any additional questions.    LINH HICKMAN MD  Department of Hospital Medicine   [facility]

## 2024-11-19 NOTE — ANESTHESIA PROCEDURE NOTES
Intubation    Date/Time: 11/19/2024 12:41 PM    Performed by: Rigoberto Abreu CRNA  Authorized by: Jamie Gill MD    Intubation:     Induction:  Intravenous    Intubated:  Postinduction    Mask Ventilation:  Easy mask    Attempts:  1    Attempted By:  CRNA    Method of Intubation:  Video laryngoscopy    Blade:  Glidescope 3    Laryngeal View Grade: Grade I - full view of cords      Difficult Airway Encountered?: No      Complications:  None    Airway Device:  Oral endotracheal tube    Airway Device Size:  7.0    Style/Cuff Inflation:  Cuffed    Inflation Amount (mL):  7    Tube secured:  21    Secured at:  The teeth (to equilibration)    Placement Verified By:  Capnometry    Complicating Factors:  Obesity, poor neck/head extension and short neck    Findings Post-Intubation:  BS equal bilateral and atraumatic/condition of teeth unchanged  Notes:      Pt Demonstrated comfortable head position prior to induction.  This comfortable, Neutral, Head position was maintained throughout Induction/Intubation.  Smooth, tolerated well

## 2024-11-19 NOTE — ASSESSMENT & PLAN NOTE
Body mass index is 43.72 kg/m². Morbid obesity complicates all aspects of disease management from diagnostic modalities to treatment. Weight loss encouraged and health benefits explained to patient.

## 2024-11-19 NOTE — PLAN OF CARE
Problem: Physical Therapy  Goal: Physical Therapy Goal  Description: Short term goals:  . Supine to sit with [place]  . Sit to stand transfer with [place]  . Gait  x 150 feet with Stand-by Assistance using No Assistive Device.     Long term goals:  Patient will regain full independent functional mobility with lowest level of assistive device to return to desired living arrangement and prior ADL's.     Outcome: Progressing

## 2024-11-19 NOTE — PT/OT/SLP EVAL
Physical Therapy Evaluation    Patient Name:  Jonathan Rodriguez   MRN:  [id number]    Recommendations:     Discharge Recommendations: No Therapy Indicated   Discharge Equipment Recommendations: none   Barriers to discharge: None    Assessment:     Jonathan Rdoriguez is a 49 y.o. female admitted with a medical diagnosis of Cervical myelopathy.  She presents with the following impairments/functional limitations: gait instability     Patient doing well post op. Good pain control. Able to ambulated 35 ft with HHA. A little unsteady with ambulation. Anticipate home dc    Rehab Prognosis: Good; patient would benefit from acute skilled PT services to address these deficits and reach maximum level of function.    Recent Surgery: Procedure(s) (LRB):  C6-C7 ACDF (N/A) Day of Surgery    Plan:     During this hospitalization, patient to be seen daily to address the identified rehab impairments via gait training, therapeutic activities, therapeutic exercises, neuromuscular re-education and progress toward the following goals:    Plan of Care Expires:       Subjective     Chief Complaint: drowsy  Patient/Family Comments/goals: agreeable to evaluation  Pain/Comfort:  Pain Rating 1: 0/10    Patients cultural, spiritual, Scientologist conflicts given the current situation: no    Living Environment:  Home with spouse and son  Prior to admission, patients level of function was independent and working.  Equipment used at home: CPAP.  DME owned (not currently used): none.  Upon discharge, patient will have assistance from family.    Objective:     Communicated with nurse prior to session.  Patient found supine with cervical collar, pulse ox (continuous), peripheral IV, telemetry, oxygen, blood pressure cuff  upon PT entry to room.    General Precautions: Standard, fall  Orthopedic Precautions:    Braces: Cervical collar  Respiratory Status: Nasal cannula, flow 2 L/min    Exams:  RLE ROM: WNL  RLE Strength: 5/5  LLE ROM: WNL  LLE Strength: 5/5    Functional  Mobility:  Bed Mobility:     Supine to Sit: minimum assistance  Sit to Supine: minimum assistance  Transfers:     Sit to Stand:  contact guard assistance with hand-held assist  Gait: 35 ft with HHA, slow gait rachel  Balance: unsteady      AM-PAC 6 CLICK MOBILITY  Total Score:19       Treatment & Education:  Patient educated on the role of physical therapy in the acute care setting, call light usage, and safety including calling nurse for mobility  PT answered all patient questions within PT scope of practice  Mobility as noted      Patient left HOB elevated with all lines intact, call button in reach, and nurse notified.    GOALS:   Multidisciplinary Problems       Physical Therapy Goals          Problem: Physical Therapy    Goal Priority Disciplines Outcome Interventions   Physical Therapy Goal     PT, PT/OT Progressing    Description: Short term goals:  . Supine to sit with [place]  . Sit to stand transfer with [place]  . Gait  x 150 feet with Stand-by Assistance using No Assistive Device.     Long term goals:  Patient will regain full independent functional mobility with lowest level of assistive device to return to desired living arrangement and prior ADL's.                          History:     Past Medical History:   Diagnosis Date    Anxiety disorder, unspecified     Asthma     GERD (gastroesophageal reflux disease)     Hypertension     Lumbar pain     Pseudoseizures     Unspecified osteoarthritis, unspecified site        Past Surgical History:   Procedure Laterality Date    bladder tack      COLONOSCOPY      HYSTERECTOMY      LAPAROSCOPIC CHOLECYSTECTOMY N/A 03/10/2023    Procedure: CHOLECYSTECTOMY, LAPAROSCOPIC;  Surgeon: Josue Ramirez MD;  Location: [facility];  Service: General;  Laterality: N/A;       Time Tracking:     PT Received On: 11/19/24  PT Start Time: 1625     PT Stop Time: 1645  PT Total Time (min): 20 min     Billable Minutes: Evaluation 20 11/19/2024

## 2024-11-19 NOTE — TRANSFER OF CARE
Anesthesia Transfer of Care Note    Patient: Jonathan Need    Procedure(s) Performed: Procedure(s) (LRB):  C6-C7 ACDF (N/A)    Patient location: PACU    Anesthesia Type: general    Transport from OR: Transported from OR on 6-10 L/min O2 by face mask with adequate spontaneous ventilation    Post pain: adequate analgesia    Post assessment: no apparent anesthetic complications    Post vital signs: stable    Level of consciousness: awake, oriented and alert    Nausea/Vomiting: no nausea/vomiting    Complications: none    Transfer of care protocol was followedComments: Good SV continue, NAD noted, VSS, RTRN      Last vitals: Visit Vitals  BP (!) 159/100 (BP Location: Right forearm, Patient Position: Lying)   Pulse 100   Temp 36.6 °C (97.9 °F) (Oral)   Resp 15   SpO2 99%   Breastfeeding No

## 2024-11-19 NOTE — OR NURSING
"1420- pt arrived in pacu. Vss, respirations even et unlabored. Pt drowsy. Dressing at anterior neck clean, dry and intact. Pt has cervical collar in place. No signs of pain or distress.    1439- Pt more awake. Responds appropriately to verbal cues. Complaining of pain at neck incision. Vss, respirations even et unlabored. Dose of pain medication administered. See flow sheet.    1445- pt sleeping. Vss, respirations even et unlabored. Dressing c/d/I.     1500- pt complaining of pain. Vss, respirations even et unlabored. Additional dose of pain medication administered. See flow sheet.    1510- pt complaining of pain. Vss, respirations even et unlabored. Additional dose of pain medication administered. See flow sheet.    1516- pt continues to complain of pain. Vss, respirations even et unlabored. Additional dose of pain medication administered. See flow sheet.    1530- Pt resting more comfortably. States pain "is better".   1533- pt ready for discharge from pacu. [facility]    1550- report given to OBDULIO Bowers RN. VS: 150/82, hr 69, rr 16, t 97.5, O2 94% on 2 L NC       "

## 2024-11-19 NOTE — HPI
Ms. Rodriguez is a 49 Y O female with PMH of HTN, DENTON on CPAP who presented for elective cervical spine surgery with Dr. Ellis. Patient was seen post operatively. She denies chest pain, palpitations, shortness of breath, nausea or vomiting. She is compliant with her home medications.

## 2024-11-19 NOTE — PLAN OF CARE
[facility]  Initial Discharge Assessment       Primary Care Provider: Kj Kramer MD    Admission Diagnosis: Cervical radiculopathy [M54.12]  Cervical myelopathy [G95.9]  Cervical myelopathy [G95.9]    Admission Date: 11/19/2024  Expected Discharge Date:     Transition of Care Barriers: None    Payor: [employer] / Plan: [facility] CHOICE PLUS / Product Type: Commercial /     Extended Emergency Contact Information  Primary Emergency Contact: Sarah Rodriguez  Address: [address] [place] [address]           [place] [address] [place]  Mobile Phone: [phone]  Relation: Spouse  Preferred language: English   needed? No    Discharge Plan A: Home with family  Discharge Plan B: Home with family      [facility] [place], [place] [address] [place] [address]  [address] [place] [address]  [facility] [place] [address]  Phone: [phone] Fax: [phone]      Initial Assessment (most recent)       Adult Discharge Assessment - 11/19/24 1205          Discharge Assessment    Assessment Type Discharge Planning Assessment     Source of Information patient     Communicated ELOISE with patient/caregiver Date not available/Unable to determine     People in Home spouse;child(mayank), adult     Do you expect to return to your current living situation? Yes     Do you have help at home or someone to help you manage your care at home? Yes     Who are your caregiver(s) and their phone number(s)?  sarah [phone]     Prior to hospitilization cognitive status: Alert/Oriented     Current cognitive status: Alert/Oriented     Walking or Climbing Stairs Difficulty no     Dressing/Bathing Difficulty no     Equipment Currently Used at Home bath bench;CPAP     Patient currently being followed by outpatient case management? No     Do you currently have service(s) that help you manage your care at home? No     Do you take prescription medications? Yes     Do you have prescription coverage? Yes      Coverage [facility]     Do you have any problems affording any of your prescribed medications? No     Is the patient taking medications as prescribed? yes     Who is going to help you get home at discharge? family     How do you get to doctors appointments? car, drives self;family or friend will provide     Are you on dialysis? No     Do you take coumadin? No     Discharge Plan A Home with family     Discharge Plan B Home with family     DME Needed Upon Discharge  none     Discharge Plan discussed with: Patient     Transition of Care Barriers None                      Spoke with patient in her room. She lives with her  and her adult son.  sarah is at the bedside. Patient has no home services. States that she works a full time job and is very active. Uses a shower bench and cpap at home. Dc plan is home with family. SDOH completed by patient within the last 6 months. Cm will follow for dc planning.

## 2024-11-19 NOTE — ANESTHESIA PREPROCEDURE EVALUATION
11/19/2024  Jonathan Rodriguez is a 49 y.o., female.      Pre-op Assessment          Review of Systems  Cardiovascular:     Hypertension                                    Hypertension         Pulmonary:    Asthma    Sleep Apnea   Asthma:    Obstructive Sleep Apnea (DENTON).           Hepatic/GI:     GERD         Gerd          Musculoskeletal:  Arthritis        Arthritis          Neurological:       Seizures        Arthritis      Seizure Disorder                          Psych:  Psychiatric History anxiety                 Physical Exam  General: Well nourished, Cooperative, Alert and Oriented    Airway:  Mallampati: II / II  Mouth Opening: Normal  TM Distance: Normal  Neck ROM: Extension Decreased, Extension Painful, Left Lateral Motion Decreased, Right Lateral Motion Decreased, Flexion Decreased    Dental:  Intact    Chest/Lungs:  Clear to auscultation    Heart:  Rate: Normal  Rhythm: Regular Rhythm  Sounds: Normal        Chemistry        Component Value Date/Time     11/04/2024 1206    K 3.4 (L) 11/04/2024 1206     11/04/2024 1206    CO2 30 11/04/2024 1206    BUN 12 11/04/2024 1206    CREATININE 1.12 (H) 11/04/2024 1206    GLU 83 11/04/2024 1206        Component Value Date/Time    CALCIUM 9.1 11/04/2024 1206    ALKPHOS 64 11/04/2024 1206    AST 7 (L) 11/04/2024 1206    ALT 22 11/04/2024 1206    BILITOT 0.7 11/04/2024 1206    EGFRNONAA 73 11/22/2021 2101        Lab Results   Component Value Date    WBC 11.09 (H) 11/04/2024    HGB 13.9 11/04/2024    HCT 44.2 11/04/2024     11/04/2024     Results for orders placed or performed in visit on 11/04/24   EKG 12-lead    Collection Time: 11/04/24 12:33 PM   Result Value Ref Range    QRS Duration 88 ms    [facility]HS QTC Calculation 410 ms    Narrative    Test Reason : I10,    Vent. Rate :  53 BPM     Atrial Rate :  53 BPM     P-R Int : 164 ms          QRS Dur :  88  ms      QT Int : 438 ms       P-R-T Axes :  27   6  41 degrees    QTcB Int : 410 ms    Sinus bradycardia  Anterolateral infarct ,age undetermined  Abnormal ECG  No previous ECGs available  Confirmed by Rafael Hutton ([id number]) on 11/13/2024 9:00:48 AM    Referred By: NIGEL BRYANT           Confirmed By: Rafael Hutton         Anesthesia Plan  Type of Anesthesia, risks & benefits discussed:    Anesthesia Type: Gen ETT  Intra-op Monitoring Plan: Standard ASA Monitors  Post Op Pain Control Plan: multimodal analgesia  Induction:  IV  Airway Plan: Direct and Video  Informed Consent: Informed consent signed with the Patient and all parties understand the risks and agree with anesthesia plan.  All questions answered.   ASA Score: 3  Day of Surgery Review of History & Physical: H&P Update referred to the surgeon/provider.I have interviewed and examined the patient. I have reviewed the patient's H&P dated: There are no significant changes.     Ready For Surgery From Anesthesia Perspective.     .

## 2024-11-19 NOTE — NURSING
1556 patient arrived to room 463 via stretcher. Transported from stretcher to floor bed with 2 person assist. Patient is oriented to admission questions but is still a little drowsy. VS are stable with HR a little christiano in the francisco 50s. NADN. Respirations even and unlabored. Patient voiced no concerns at this time. Bed in low position, call light within reach. Plan of care continued.

## 2024-11-19 NOTE — OP NOTE
[facility]  Surgery Department  Operative Note    SUMMARY     Date of Procedure: 11/19/2024     Procedure: Procedure(s) (LRB):  C6-C7 ACDF (N/A)     Surgeons and Role:     * Kulwant Ellis MD - Primary    Assistant: ADRIA Lucia RNFA; no qualified resident or fellow was available to assist with this case    Pre-Operative Diagnosis: Cervical radiculopathy [M54.12], cervical myelopathy    Post-Operative Diagnosis: Post-Op Diagnosis Codes:     * Cervical radiculopathy [M54.12], cervical myelopathy    Anesthesia: General    Technical Procedures Used:   1. Anterior cervical diskectomy and fusion C6-7  2. Anterior segmental instrumentation C6-7  3. Insertion of biomechanical device (interbody cage)  4. Placement and removal of Diego-Wells tongs  5. Use of allograft for spine surgery     Description of the Findings of the Procedure:   Indications:  This is a 49 y.o. female with cervical myeloradiculopathy.  They failed attempted conservative measures.  Risks, benefits, and alternatives were discussed with the patient and they elected to proceed with surgery.    Procedure in detail:  The patient was identified in the preoperative holding area and the surgical site was marked. They were then taken back to the operating room where general endotracheal anesthesia was induced.  Diego-Wells tongs were applied using the usual sterile technique.  They were then positioned in the supine position on the OR table with the arms tucked to the side and the shoulders taped in the depressed position.  15 lb of traction were applied to the Diego-Wells tongs.  Care was taken to ensure proper padding of all bony prominences. The anterior cervical spine was prepped and draped in the normal sterile fashion. A timeout was performed. The incision site was localized with intraoperative fluoroscopy. After the incision was marked out a solution of epinephrine was injected in the skin and subcutaneous  tissues. A left-sided anterior cervical incision was made. This was carried down to the platysma muscle. A plane was developed over the platysma. The platysma was divided in line with the incision with use of electrocautery. A plane was developed beneath the platysma. The sternocleidomastoid muscle was identified and released from its fascia medially. A plane was developed medial to sternocleidomastoid and the carotid sheath and lateral to the trachea and esophagus exposing the anterior cervical spine. The prevertebral fascia was divided. The anterior cervical spine was exposed and a hemostat was used to grasp the fibers of the anterior longitudinal ligament overlying the suspected disc. Lateral fluoroscopic imaging was used to confirm the appropriate level for surgery.    Anterior osteophytes were removed with the use of Leksell rongeur. An annulotomy was performed with the use of a 15 blade scalpel. The disc material was debrided with a pituitary rongeur.  A complete diskectomy was performed.  The [place] distraction pins were then inserted. Endplate preparation was performed with use of curettes. Posterior osteophytes were removed with a high-speed bur. A plane was developed under the posterior longitudinal ligament with a microcurette. The posterior longitudinal ligament was then resected with use of Kerrison punch. The decompression was carried out into the foramen bilaterally with the Kerrison punch.  The central canal and neuroforamen bilaterally were observed to be adequately decompressed.  Trials were used to determine the appropriate size implant.  This was filled with Zavation allograft and inserted under fluoroscopic guidance. A plate was inserted along with the cage.  Two screws were placed through the plate, 1 each into the superior and inferior vertebral bodies.  10 lb of traction removed from the Diego-Wells tongs.  Final implant placement and spinal alignment was verified on fluoroscopic imaging  and found to be satisfactory.    The wound was copiously irrigated with normal saline. The wound was explored for any persistent bleeders and none were found, good hemostasis had been achieved.  The platysma layer was closed with a running Stratafix #0 suture. A running subcuticular layer was performed with 2-0 Stratafix suture. Dermabond prineo was applied to cover the incision. A sterile dressing of gauze and Tegaderm was then applied.  The remaining traction was removed from the [employer] tongs.  The tongs were removed without incident.  A three-inch soft foam collar was applied to the neck.    The patient was then extubated and awakened and taken to recovery in good condition having suffered no untoward event.      Complications: No    Estimated Blood Loss (EBL): 25 mL           Implants:  Zavation anterior cervical cages, plates and screws, allograft  Implant Name Type Inv. Item Serial No.  Lot No. LRB No. Used Action   LOG [id number] - ACDF [facility]formerly [facility] TRAY 2 OF 3 - 001 - 1               Specimens:   Specimen (24h ago, onward)      None                    Condition: Good    Disposition: PACU - hemodynamically stable.    Attestation: I was present and scrubbed for the entire procedure.

## 2024-11-19 NOTE — OP NOTE
Certification of Assistant at Surgery       Surgery Date: 11/19/2024     Participating Surgeons:  Surgeons and Role:     * Kulwant Ellis MD - Primary    Procedures:  Procedure(s) (LRB):  C6-C7 ACDF (N/A)    Assistant Surgeon's Certification of Necessity:  I understand that section 1842 (b) (6) (d) of the Social Security Act generally prohibits Medicare Part B reasonable charge payment for the services of assistants at surgery in teaching hospitals when qualified residents are available to furnish such services. I certify that the services for which payment is claimed were medically necessary, and that no qualified resident was available to perform the services. I further understand that these services are subject to post-payment review by the Medicare carrier.      Latosha Colindres NP    11/19/2024  2:32 PM

## 2024-11-19 NOTE — HOSPITAL COURSE
11/19- Seen in consultation, home medications reviewed.   11/20- BP stable, doing well with PT. Stable for discharge from medicine stand point.

## 2024-11-19 NOTE — PLAN OF CARE
Problem: Occupational Therapy  Goal: Occupational Therapy Goal  Description: STG: (in 1 week)  Pt will perform grooming with supervision  Pt will bathe with min(A)  Pt will perform UE dressing with CGA  Pt will perform LE dressing with CGA  Pt will transfer bed/chair/bsc with CGA  Pt will perform standing task x 3 min with SBA   Pt will tolerate 15 minutes of tx without fatigue      LTG: (in 5 weeks)  1.Restore to max I with self care and mobility.    Outcome: Progressing       Pt is post op C6-C7 ACDF. Pt has some mild tingling in (B) LE and (R) UE. Pt demonstrated decreased balance with standing and ambulation, but attributes that to the anesthesia and pain medication. Pt plans to return home with her spouse and her son.

## 2024-11-19 NOTE — SUBJECTIVE & OBJECTIVE
Past Medical History:   Diagnosis Date    Anxiety disorder, unspecified     Asthma     GERD (gastroesophageal reflux disease)     Hypertension     Lumbar pain     Pseudoseizures     Unspecified osteoarthritis, unspecified site        Past Surgical History:   Procedure Laterality Date    bladder tack      COLONOSCOPY      HYSTERECTOMY      LAPAROSCOPIC CHOLECYSTECTOMY N/A 03/10/2023    Procedure: CHOLECYSTECTOMY, LAPAROSCOPIC;  Surgeon: Josue Ramirez MD;  Location: [facility];  Service: General;  Laterality: N/A;       Review of patient's allergies indicates:   Allergen Reactions    Gabapentin Shortness Of Breath    Lisinopril Other (See Comments)     cough    Aspirin Nausea And Vomiting       No current facility-administered medications on file prior to encounter.     Current Outpatient Medications on File Prior to Encounter   Medication Sig    amLODIPine (NORVASC) 5 MG tablet Take 1 tablet (5 mg total) by mouth once daily.    losartan (COZAAR) 50 MG tablet Take 1 tablet (50 mg total) by mouth once daily.    metoprolol tartrate (LOPRESSOR) 50 MG tablet Take 1 tablet (50 mg total) by mouth 2 (two) times a day.    pantoprazole (PROTONIX) 40 MG tablet Take 1 tablet (40 mg total) by mouth once daily.    ketoconazole (NIZORAL) 2 % shampoo Apply topically twice a week.    naproxen (NAPROSYN) 500 MG tablet Take 1 tablet (500 mg total) by mouth 2 (two) times daily.    pregabalin (LYRICA) 75 MG capsule Take 1 capsule (75 mg total) by mouth 2 (two) times daily.    triamterene-hydrochlorothiazide 37.5-25 mg (MAXZIDE-25) 37.5-25 mg per tablet Take 1 tablet by mouth Daily.     Family History       Problem Relation (Age of Onset)    Cancer Paternal Aunt, Paternal Uncle    Diabetes Mother, Father, Maternal Aunt, Maternal Grandfather    Heart disease Maternal Grandmother, Maternal Grandfather, Paternal Grandmother, Paternal Grandfather    Hypertension Mother, Sister          Tobacco Use    Smoking status: Never    Smokeless  tobacco: Never   Substance and Sexual Activity    Alcohol use: Never    Drug use: Never    Sexual activity: Yes     Review of Systems   All other systems reviewed and are negative.    Objective:     Vital Signs (Most Recent):  Temp: 97.5 °F (36.4 °C) (11/19/24 1557)  Pulse: (!) 55 (11/19/24 1557)  Resp: 16 (11/19/24 1530)  BP: (!) 145/76 (11/19/24 1557)  SpO2: 95 % (11/19/24 1557) Vital Signs (24h Range):  Temp:  [97.5 °F (36.4 °C)-98.2 °F (36.8 °C)] 97.5 °F (36.4 °C)  Pulse:  [] 55  Resp:  [14-26] 16  SpO2:  [93 %-100 %] 95 %  BP: (123-159)/() 145/76     Weight: 122.8 kg (270 lb 11.6 oz)  Body mass index is 43.72 kg/m².     Physical Exam  Vitals and nursing note reviewed.   Constitutional:       Appearance: Normal appearance. She is obese.   HENT:      Head: Normocephalic and atraumatic.      Mouth/Throat:      Mouth: Mucous membranes are moist.   Eyes:      Extraocular Movements: Extraocular movements intact.      Pupils: Pupils are equal, round, and reactive to light.   Neck:      Comments: C-collar in place.   Cardiovascular:      Rate and Rhythm: Normal rate and regular rhythm.   Pulmonary:      Effort: Pulmonary effort is normal.      Breath sounds: Normal breath sounds.   Abdominal:      General: Bowel sounds are normal.      Palpations: Abdomen is soft.   Musculoskeletal:         General: Normal range of motion.   Neurological:      General: No focal deficit present.      Mental Status: She is alert and oriented to person, place, and time. Mental status is at baseline.   Psychiatric:         Mood and Affect: Mood normal.         Behavior: Behavior normal.          Significant Labs: All pertinent labs within the past 24 hours have been reviewed.    Significant Imaging: I have reviewed all pertinent imaging results/findings within the past 24 hours.

## 2024-11-19 NOTE — ASSESSMENT & PLAN NOTE
Patients blood pressure range in the last 24 hours was: BP  Min: 123/72  Max: 159/100.The patient's inpatient anti-hypertensive regimen is listed below:  Current Antihypertensives  amLODIPine tablet 5 mg, Daily, Oral  losartan tablet 50 mg, Daily, Oral  metoprolol tartrate (LOPRESSOR) tablet 50 mg, 2 times daily, Oral  triamterene-hydrochlorothiazide 37.5-25 mg per tablet 1 tablet, Daily, Oral    Plan  - BP is controlled, no changes needed to their regimen

## 2024-11-20 VITALS
OXYGEN SATURATION: 96 % | SYSTOLIC BLOOD PRESSURE: 112 MMHG | DIASTOLIC BLOOD PRESSURE: 66 MMHG | TEMPERATURE: 97 F | WEIGHT: 270.75 LBS | HEART RATE: 62 BPM | RESPIRATION RATE: 18 BRPM | HEIGHT: 66 IN | BODY MASS INDEX: 43.51 KG/M2

## 2024-11-20 PROBLEM — R00.1 SINUS BRADYCARDIA: Status: ACTIVE | Noted: 2024-11-20

## 2024-11-20 PROCEDURE — 25000003 PHARM REV CODE 250: Performed by: ORTHOPAEDIC SURGERY

## 2024-11-20 PROCEDURE — 99900035 HC TECH TIME PER 15 MIN (STAT)

## 2024-11-20 PROCEDURE — 94761 N-INVAS EAR/PLS OXIMETRY MLT: CPT

## 2024-11-20 PROCEDURE — 27000221 HC OXYGEN, UP TO 24 HOURS

## 2024-11-20 PROCEDURE — 63600175 PHARM REV CODE 636 W HCPCS: Performed by: ORTHOPAEDIC SURGERY

## 2024-11-20 PROCEDURE — 99232 SBSQ HOSP IP/OBS MODERATE 35: CPT | Mod: ,,, | Performed by: INTERNAL MEDICINE

## 2024-11-20 PROCEDURE — 97116 GAIT TRAINING THERAPY: CPT

## 2024-11-20 RX ADMIN — OXYCODONE 5 MG: 5 TABLET ORAL at 04:11

## 2024-11-20 RX ADMIN — FAMOTIDINE 20 MG: 20 TABLET, FILM COATED ORAL at 08:11

## 2024-11-20 RX ADMIN — ACETAMINOPHEN 500 MG: 500 TABLET ORAL at 12:11

## 2024-11-20 RX ADMIN — OXYCODONE 5 MG: 5 TABLET ORAL at 12:11

## 2024-11-20 RX ADMIN — DOCUSATE SODIUM 100 MG: 100 CAPSULE, LIQUID FILLED ORAL at 08:11

## 2024-11-20 RX ADMIN — LOSARTAN POTASSIUM 50 MG: 50 TABLET, FILM COATED ORAL at 08:11

## 2024-11-20 RX ADMIN — DEXAMETHASONE SODIUM PHOSPHATE 8 MG: 4 INJECTION, SOLUTION INTRA-ARTICULAR; INTRALESIONAL; INTRAMUSCULAR; INTRAVENOUS; SOFT TISSUE at 12:11

## 2024-11-20 RX ADMIN — ACETAMINOPHEN 500 MG: 500 TABLET ORAL at 06:11

## 2024-11-20 RX ADMIN — CEFAZOLIN 2 G: 2 INJECTION, POWDER, FOR SOLUTION INTRAMUSCULAR; INTRAVENOUS at 12:11

## 2024-11-20 RX ADMIN — SENNOSIDES AND DOCUSATE SODIUM 1 TABLET: 50; 8.6 TABLET ORAL at 08:11

## 2024-11-20 RX ADMIN — MUPIROCIN: 20 OINTMENT TOPICAL at 08:11

## 2024-11-20 RX ADMIN — POLYETHYLENE GLYCOL 3350 17 G: 17 POWDER, FOR SOLUTION ORAL at 08:11

## 2024-11-20 RX ADMIN — PANTOPRAZOLE SODIUM 40 MG: 40 TABLET, DELAYED RELEASE ORAL at 08:11

## 2024-11-20 RX ADMIN — PREGABALIN 75 MG: 75 CAPSULE ORAL at 08:11

## 2024-11-20 RX ADMIN — DEXAMETHASONE SODIUM PHOSPHATE 8 MG: 4 INJECTION, SOLUTION INTRA-ARTICULAR; INTRALESIONAL; INTRAMUSCULAR; INTRAVENOUS; SOFT TISSUE at 06:11

## 2024-11-20 RX ADMIN — METOPROLOL TARTRATE 50 MG: 50 TABLET, FILM COATED ORAL at 08:11

## 2024-11-20 RX ADMIN — AMLODIPINE BESYLATE 5 MG: 5 TABLET ORAL at 08:11

## 2024-11-20 RX ADMIN — OXYCODONE 5 MG: 5 TABLET ORAL at 08:11

## 2024-11-20 RX ADMIN — ACETAMINOPHEN 500 MG: 500 TABLET ORAL at 11:11

## 2024-11-20 NOTE — ASSESSMENT & PLAN NOTE
Patients blood pressure range in the last 24 hours was: BP  Min: 112/66  Max: 189/104.The patient's inpatient anti-hypertensive regimen is listed below:  Current Antihypertensives  amLODIPine tablet 5 mg, Daily, Oral  losartan tablet 50 mg, Daily, Oral  metoprolol tartrate (LOPRESSOR) tablet 50 mg, 2 times daily, Oral  triamterene-hydrochlorothiazide 37.5-25 mg per tablet 1 tablet, Daily, Oral    Plan  - BP is controlled, no changes needed to their regimen

## 2024-11-20 NOTE — ANESTHESIA POSTPROCEDURE EVALUATION
Anesthesia Post Evaluation    Patient: Jonathan Need    Procedure(s) Performed: Procedure(s) (LRB):  C6-C7 ACDF (N/A)    Final Anesthesia Type: general      Patient location during evaluation: PACU  Patient participation: Yes- Able to Participate  Level of consciousness: awake and alert  Post-procedure vital signs: reviewed and stable  Pain management: adequate  Airway patency: patent  DENTON mitigation strategies: Multimodal analgesia  PONV status at discharge: No PONV  Anesthetic complications: no      Cardiovascular status: blood pressure returned to baseline  Respiratory status: unassisted  Hydration status: euvolemic  Follow-up not needed.              Vitals Value Taken Time   /66 11/20/24 0738   Temp 36.3 °C (97.4 °F) 11/20/24 0738   Pulse 62 11/20/24 0819   Resp 18 11/20/24 0841   SpO2 96 % 11/20/24 0819         Event Time   Out of Recovery 15:33:00         Pain/Elinor Score: Pain Rating Prior to Med Admin: 6 (11/20/2024  8:41 AM)  Pain Rating Post Med Admin: 3 (11/20/2024  9:20 AM)  Elinor Score: 8 (11/19/2024  7:05 PM)

## 2024-11-20 NOTE — PLAN OF CARE
[facility]  Discharge Final Note    Primary Care Provider: Kj Kramer MD    Expected Discharge Date: 11/20/2024    Final Discharge Note (most recent)       Final Note - 11/20/24 1250          Final Note    Assessment Type Final Discharge Note     Anticipated Discharge Disposition Home or Self Care     What phone number can be called within the next 1-3 days to see how you are doing after discharge? [phone]        Post-Acute Status    Discharge Delays None known at this time                  Pt discharged home today, no needs.

## 2024-11-20 NOTE — DISCHARGE INSTRUCTIONS
Office: [phone]       Spine Discharge Instructions     - Follow up with Dr. Ellis in 10-14 days. Please call for appointment (if not already scheduled).   - Keep dressing clean and dry. May remove dressing 3 days after surgery  - May shower upon discharge. Do not scrub, tub bathe, or submerge the incision.  - No lifting greater than 10 pounds.  - Ambulate multiple times each day   - You may bend and twist for usual daily activities  - You may sleep in any position that is comfortable  - Do not drive a motor vehicle while on narcotic pain medication.   - Utilize pain medication (narcotics) as prescribed  - Do not exceed 3g Tylenol in a 24 hour period.   - Use stool softeners while taking narcotics to prevent constipation   - Resume your usual diet

## 2024-11-20 NOTE — DISCHARGE SUMMARY
[facility]  Discharge Note  Short Stay    Procedure(s) (LRB):  C6-C7 ACDF (N/A)      OUTCOME: Patient tolerated treatment/procedure well without complication and is now ready for discharge.    DISPOSITION: Home or Self Care    FINAL DIAGNOSIS:  Cervical myelopathy    FOLLOWUP: In clinic    DISCHARGE INSTRUCTIONS:    Discharge Procedure Orders   Diet general   Order Comments: Resume home diet     Lifting restrictions   Order Comments: No heavy lifting or strenuous activity     No driving, operating heavy equipment or signing legal documents while taking pain medication     Other restrictions (specify):   Order Comments: [facility]  Office: [phone]    Spine Discharge Instructions    - Follow up with Dr. Ellis in 10-14 days. Please call for appointment (if not already scheduled).   - Keep dressing clean and dry. May remove dressing 3 days after surgery  - May shower upon discharge. Do not scrub, tub bathe, or submerge the incision.  - No lifting greater than 10 pounds.  - Ambulate multiple times each day   - You may bend and twist for usual daily activities  - You may sleep in any position that is comfortable  - Do not drive a motor vehicle while on narcotic pain medication.   - Utilize pain medication (narcotics) as prescribed  - Do not exceed 3g Tylenol in a 24 hour period.   - Use stool softeners while taking narcotics to prevent constipation   - Resume your usual diet     Wound care routine (specify)   Order Comments: Remove Tegaderm and gauze dressing in 72 hours.  Leave Dermabond mesh glued onto skin.  Okay to shower with running water, no soaking or submerging.     Call MD for:  temperature >100.4     Call MD for:  persistent nausea and vomiting     Call MD for:  severe uncontrolled pain     Call MD for:  difficulty breathing, headache or visual disturbances     Call MD for:  redness, tenderness, or signs of infection (pain, swelling, redness, odor or green/yellow discharge  around incision site)     Call MD for:  hives     Call MD for:  persistent dizziness or light-headedness     Call MD for:  extreme fatigue         Clinical Reference Documents Added to Patient Instructions         Document    ANTERIOR CERVICAL FUSION DISCHARGE INSTRUCTIONS (ENGLISH)    CERVICAL MYELOPATHY  (ENGLISH)    HIGH BLOOD PRESSURE DISCHARGE INSTRUCTIONS (ENGLISH)    LAMINECTOMY DISCHARGE INSTRUCTIONS (ENGLISH)    OBSTRUCTIVE SLEEP APNEA DISCHARGE INSTRUCTIONS, ADULT (ENGLISH)

## 2024-11-20 NOTE — SUBJECTIVE & OBJECTIVE
Interval History: Patient seen and examined at the bedside, reports feeling ok, pain is in control.     Review of Systems   All other systems reviewed and are negative.    Objective:     Vital Signs (Most Recent):  Temp: 97.4 °F (36.3 °C) (11/20/24 0738)  Pulse: 62 (11/20/24 0819)  Resp: 18 (11/20/24 0841)  BP: 112/66 (11/20/24 0738)  SpO2: 96 % (11/20/24 0819) Vital Signs (24h Range):  Temp:  [97.3 °F (36.3 °C)-97.9 °F (36.6 °C)] 97.4 °F (36.3 °C)  Pulse:  [] 62  Resp:  [14-26] 18  SpO2:  [83 %-100 %] 96 %  BP: (112-189)/() 112/66     Weight: 122.8 kg (270 lb 11.6 oz)  Body mass index is 43.72 kg/m².    Intake/Output Summary (Last 24 hours) at 11/20/2024 1113  Last data filed at 11/19/2024 1353  Gross per 24 hour   Intake --   Output 25 ml   Net -25 ml         Physical Exam  Vitals and nursing note reviewed.   Constitutional:       Appearance: Normal appearance. She is obese.   HENT:      Head: Normocephalic and atraumatic.      Mouth/Throat:      Mouth: Mucous membranes are moist.   Eyes:      Extraocular Movements: Extraocular movements intact.      Pupils: Pupils are equal, round, and reactive to light.   Neck:      Comments: C-collar in place.   Cardiovascular:      Rate and Rhythm: Normal rate and regular rhythm.   Pulmonary:      Effort: Pulmonary effort is normal.      Breath sounds: Normal breath sounds.   Abdominal:      General: Bowel sounds are normal.      Palpations: Abdomen is soft.   Musculoskeletal:         General: Normal range of motion.   Neurological:      General: No focal deficit present.      Mental Status: She is alert and oriented to person, place, and time. Mental status is at baseline.   Psychiatric:         Mood and Affect: Mood normal.         Behavior: Behavior normal.             Significant Labs: All pertinent labs within the past 24 hours have been reviewed.    Significant Imaging: I have reviewed all pertinent imaging results/findings within the past 24 hours.

## 2024-11-20 NOTE — PT/OT/SLP PROGRESS
Physical Therapy Treatment    Patient Name:  Jonathan Rodriguez   MRN:  [id number]    Recommendations:     Discharge Recommendations: No Therapy Indicated  Discharge Equipment Recommendations: none  Barriers to discharge: None    Assessment:     Jonathan Rodriguez is a 49 y.o. female admitted with a medical diagnosis of Cervical myelopathy.  She presents with the following impairments/functional limitations: weakness     Patient safe with ambulation and all mobility. Good for dc home.    Rehab Prognosis: Good; patient would benefit from acute skilled PT services to address these deficits and reach maximum level of function.    Recent Surgery: Procedure(s) (LRB):  C6-C7 ACDF (N/A) 1 Day Post-Op    Plan:     During this hospitalization, patient to be seen daily to address the identified rehab impairments via gait training, therapeutic activities, therapeutic exercises, neuromuscular re-education and progress toward the following goals:    Plan of Care Expires:       Subjective     Chief Complaint: na  Patient/Family Comments/goals: agreeable to treatment  Pain/Comfort:  Pain Rating 1: 0/10      Objective:     Communicated with nurse prior to session.  Patient found supine with cervical collar, peripheral IV upon PT entry to room.     General Precautions: Standard, fall  Orthopedic Precautions:    Braces: Cervical collar  Respiratory Status: Nasal cannula, flow 2 L/min     Functional Mobility:  Bed Mobility:     Supine to Sit: independence  Sit to Supine: independence  Transfers:     Sit to Stand:  independence with no AD  Gait: 100 ft with with no ad  Balance: good      AM-PAC 6 CLICK MOBILITY  Turning over in bed (including adjusting bedclothes, sheets and blankets)?: 4  Sitting down on and standing up from a chair with arms (e.g., wheelchair, bedside commode, etc.): 4  Moving from lying on back to sitting on the side of the bed?: 4  Moving to and from a bed to a chair (including a wheelchair)?: 4  Need to walk in hospital room?:  4  Climbing 3-5 steps with a railing?: 4  Basic Mobility Total Score: 24       Treatment & Education:  Mobility as noted    Patient left HOB elevated with all lines intact, call button in reach, and nurse notified..    GOALS:   Multidisciplinary Problems       Physical Therapy Goals          Problem: Physical Therapy    Goal Priority Disciplines Outcome Interventions   Physical Therapy Goal     PT, PT/OT Progressing    Description: Short term goals:  . Supine to sit with Rupert  . Sit to stand transfer with Rupert  . Gait  x 150 feet with Stand-by Assistance using No Assistive Device.     Long term goals:  Patient will regain full independent functional mobility with lowest level of assistive device to return to desired living arrangement and prior ADL's.                          Time Tracking:     PT Received On: 11/20/24  PT Start Time: 0900     PT Stop Time: 0912  PT Total Time (min): 12 min     Billable Minutes: Gait Training 12    Treatment Type: Treatment  PT/PTA: PT     Number of PTA visits since last PT visit: 0     11/20/2024

## 2024-11-20 NOTE — PROGRESS NOTES
[facility]  [facility] Medicine  Progress Note    Patient Name: Jonathan Rodriguez  MRN: [id number]  Patient Class: IP- Inpatient   Admission Date: 11/19/2024  Length of Stay: 1 days  Attending Physician: Kulwant Ellis MD  Primary Care Provider: Kj Kramer MD        Subjective:     Principal Problem:Cervical myelopathy        HPI:  Ms. Rodriguez is a 49 Y O female with PMH of HTN, DENTON on CPAP who presented for elective cervical spine surgery with Dr. Ellis. Patient was seen post operatively. She denies chest pain, palpitations, shortness of breath, nausea or vomiting. She is compliant with her home medications.     Overview/Hospital Course:  11/19- Seen in consultation, home medications reviewed.   11/20- BP stable, doing well with PT. Stable for discharge from medicine stand point.     Interval History: Patient seen and examined at the bedside, reports feeling ok, pain is in control.     Review of Systems   All other systems reviewed and are negative.    Objective:     Vital Signs (Most Recent):  Temp: 97.4 °F (36.3 °C) (11/20/24 0738)  Pulse: 62 (11/20/24 0819)  Resp: 18 (11/20/24 0841)  BP: 112/66 (11/20/24 0738)  SpO2: 96 % (11/20/24 0819) Vital Signs (24h Range):  Temp:  [97.3 °F (36.3 °C)-97.9 °F (36.6 °C)] 97.4 °F (36.3 °C)  Pulse:  [] 62  Resp:  [14-26] 18  SpO2:  [83 %-100 %] 96 %  BP: (112-189)/() 112/66     Weight: 122.8 kg (270 lb 11.6 oz)  Body mass index is 43.72 kg/m².    Intake/Output Summary (Last 24 hours) at 11/20/2024 1113  Last data filed at 11/19/2024 1353  Gross per 24 hour   Intake --   Output 25 ml   Net -25 ml         Physical Exam  Vitals and nursing note reviewed.   Constitutional:       Appearance: Normal appearance. She is obese.   HENT:      Head: Normocephalic and atraumatic.      Mouth/Throat:      Mouth: Mucous membranes are moist.   Eyes:      Extraocular Movements: Extraocular movements intact.      Pupils: Pupils are equal, round, and reactive to light.    Neck:      Comments: C-collar in place.   Cardiovascular:      Rate and Rhythm: Normal rate and regular rhythm.   Pulmonary:      Effort: Pulmonary effort is normal.      Breath sounds: Normal breath sounds.   Abdominal:      General: Bowel sounds are normal.      Palpations: Abdomen is soft.   Musculoskeletal:         General: Normal range of motion.   Neurological:      General: No focal deficit present.      Mental Status: She is alert and oriented to person, place, and time. Mental status is at baseline.   Psychiatric:         Mood and Affect: Mood normal.         Behavior: Behavior normal.             Significant Labs: All pertinent labs within the past 24 hours have been reviewed.    Significant Imaging: I have reviewed all pertinent imaging results/findings within the past 24 hours.    Assessment/Plan:      * Cervical myelopathy  S/p ACDF C6-C7 by Dr. Ellis on 11/19.  Pain controlled.  PT/OT consulted.       Cervical radiculopathy  As above.       Acute post-operative pain  Pain control with oxycodone and morphine per primary.       Sinus bradycardia  Asymptomatic.  Likely contributed from sleep apnea.  Monitor.       Morbid obesity  Body mass index is 43.72 kg/m². Morbid obesity complicates all aspects of disease management from diagnostic modalities to treatment. Weight loss encouraged and health benefits explained to patient.         DENTON (obstructive sleep apnea)  Resume home CPAP      Primary hypertension  Patients blood pressure range in the last 24 hours was: BP  Min: 112/66  Max: 189/104.The patient's inpatient anti-hypertensive regimen is listed below:  Current Antihypertensives  amLODIPine tablet 5 mg, Daily, Oral  losartan tablet 50 mg, Daily, Oral  metoprolol tartrate (LOPRESSOR) tablet 50 mg, 2 times daily, Oral  triamterene-hydrochlorothiazide 37.5-25 mg per tablet 1 tablet, Daily, Oral    Plan  - BP is controlled, no changes needed to their regimen      VTE Risk Mitigation (From admission,  onward)           Ordered     Place KARY hose  Until discontinued         11/19/24 0843     Place sequential compression device  Until discontinued         11/19/24 0843                    Discharge Planning   ELOISE: 11/20/2024     Code Status: Full Code   Is the patient medically ready for discharge?:     Reason for patient still in hospital (select all that apply): Pending disposition  Discharge Plan A: Home with family                  LINH HICKMAN MD  Department of Hospital Medicine   [facility]

## 2024-11-27 ENCOUNTER — HOSPITAL ENCOUNTER (EMERGENCY)
Facility: HOSPITAL | Age: 49
Discharge: HOME | End: 2024-11-27
Attending: FAMILY MEDICINE
Payer: COMMERCIAL

## 2024-11-27 VITALS
HEIGHT: 66 IN | BODY MASS INDEX: 43.86 KG/M2 | TEMPERATURE: 98 F | WEIGHT: 272.94 LBS | DIASTOLIC BLOOD PRESSURE: 89 MMHG | HEART RATE: 75 BPM | OXYGEN SATURATION: 97 % | RESPIRATION RATE: 18 BRPM | SYSTOLIC BLOOD PRESSURE: 133 MMHG

## 2024-11-27 DIAGNOSIS — S09.90XA INJURY OF HEAD, INITIAL ENCOUNTER: Primary | ICD-10-CM

## 2024-11-27 DIAGNOSIS — S01.01XA SCALP LACERATION, INITIAL ENCOUNTER: ICD-10-CM

## 2024-11-27 DIAGNOSIS — G95.9 CERVICAL MYELOPATHY: Primary | ICD-10-CM

## 2024-11-27 PROCEDURE — 99284 EMERGENCY DEPT VISIT MOD MDM: CPT | Mod: 25

## 2024-11-28 NOTE — ED PROVIDER NOTES
Encounter Date: 11/27/2024    SCRIBE #1 NOTE: I, Elvia Roman, siddharth scribing for, and in the presence of,  Rigoberto Hayes DO.       History     Chief Complaint   Patient presents with    Head Injury     PRESENTS TO ER WITH COMPLAINT OF TRIP AND FALL AND HAS LACERATION TO ANTERIOR HEAD     This is a 28 y/o female,who presents to the ED for evaluation. She states she was cleaning her refrigerator out when she slipped and hit the corner of her head on the freezer door. There was no LOC and she is alert and oriented x 3. She notes she recently had neck surgery via Dr. Ellis. There is no Hx of nausea or vomiting voiced today. There are no other complaints/pain in the ED at this time. She has a known hx of GERD and HTN. There is no Hx of smoking on file.     The history is provided by the patient and a relative. No  was used.     Review of patient's allergies indicates:   Allergen Reactions    Gabapentin Shortness Of Breath    Lisinopril Other (See Comments)     cough    Aspirin Nausea And Vomiting     Past Medical History:   Diagnosis Date    Anxiety disorder, unspecified     Asthma     GERD (gastroesophageal reflux disease)     Hypertension     Lumbar pain     Pseudoseizures     Unspecified osteoarthritis, unspecified site      Past Surgical History:   Procedure Laterality Date    ANTERIOR CERVICAL DISCECTOMY W/ FUSION N/A 11/19/2024    Procedure: C6-C7 ACDF;  Surgeon: Kulwant Ellis MD;  Location: [facility] OR;  Service: Orthopedics;  Laterality: N/A;    bladder tack      COLONOSCOPY      HYSTERECTOMY      LAPAROSCOPIC CHOLECYSTECTOMY N/A 03/10/2023    Procedure: CHOLECYSTECTOMY, LAPAROSCOPIC;  Surgeon: Josue Ramirez MD;  Location: [facility] OR;  Service: General;  Laterality: N/A;     Family History   Problem Relation Name Age of Onset    Hypertension Mother      Diabetes Mother      Diabetes Father      Hypertension Sister      Diabetes Maternal Aunt      Cancer Paternal Aunt       Cancer Paternal Uncle      Heart disease Maternal Grandmother      Diabetes Maternal Grandfather      Heart disease Maternal Grandfather      Heart disease Paternal Grandmother      Heart disease Paternal Grandfather       Social History     Tobacco Use    Smoking status: Never    Smokeless tobacco: Never   Substance Use Topics    Alcohol use: Never    Drug use: Never     Review of Systems   Constitutional:         Fall   Gastrointestinal:  Negative for nausea and vomiting.   Musculoskeletal:  Positive for neck pain.   Neurological:  Negative for syncope.   All other systems reviewed and are negative.      Physical Exam     Initial Vitals [11/27/24 1755]   BP Pulse Resp Temp SpO2   (!) 167/112 81 18 97.9 °F (36.6 °C) 97 %      MAP       --         Physical Exam    Nursing note and vitals reviewed.  Constitutional: She appears well-developed and well-nourished.   HENT:   Head: Normocephalic and atraumatic.   Neck brace noted.      Eyes: Conjunctivae and EOM are normal. Pupils are equal, round, and reactive to light.   There is a small abrasion noted to the left eyelid. There is no bleeding noted at this time.      Neck: Neck supple.   Normal range of motion.  Cardiovascular:  Normal rate, regular rhythm, normal heart sounds and intact distal pulses.           Pulmonary/Chest: Breath sounds normal.   Abdominal: Abdomen is soft. Bowel sounds are normal.   Musculoskeletal:         General: Normal range of motion.      Cervical back: Normal range of motion and neck supple.     Neurological: She is alert and oriented to person, place, and time. She has normal strength.   Skin: Skin is warm and dry. Capillary refill takes less than 2 seconds.   Psychiatric: She has a normal mood and affect. Thought content normal.         ED Course   Procedures  Labs Reviewed - No data to display       Imaging Results              CT Cervical Spine Without Contrast (Final result)  Result time 11/27/24 20:09:11      Final result by  Evaristo Talbot MD (11/27/24 20:09:11)                   Impression:      1. No acute abnormality.  2. Postop changes at C6-7 with mild degenerative changes.      Electronically signed by: Evaristo Talbot  Date:    11/27/2024  Time:    20:09               Narrative:    EXAMINATION:  CT CERVICAL SPINE WITHOUT CONTRAST    CLINICAL HISTORY:  Neck pain, acute, infection suspected;    TECHNIQUE:  Low dose axial CT images through the cervical spine, with sagittal and coronal reformations.  Contrast was not administered.    COMPARISON:  MRI 10/18/2024    FINDINGS:  No acute fractures of the cervical spine.  Alignment is satisfactory.    Anterior cervical fusion hardware at C6-7.    Moderate right foraminal narrowing at C6-7 and at C7-T1 on the left.    Mild posterior disc osteophyte complex at C6-7 with minimal central canal narrowing.  Metallic artifact obscures visualization.    No significant central canal narrowing.    Disc spaces appear adequately maintained.    No abscess or hematoma is identified.  No osseous destruction is detected.    No infection is detected.    Limited evaluation of the intraspinal contents demonstrates no hematoma or mass.Paraspinal soft tissues exhibit no acute abnormalities.                                       Medications - No data to display  Medical Decision Making            Attending Attestation:           Physician Attestation for Scribe:  Physician Attestation Statement for Scribe #1: I, Rigoberto Hayes, DO, reviewed documentation, as scribed by Elvia Roman in my presence, and it is both accurate and complete.             ED Course as of 12/13/24 0627   Wed Nov 27, 2024 2012 CT Cervical spine without contrast:   1. No acute abnormality.  2. Postop changes at C6-7 with mild degenerative changes.   [BW]      ED Course User Index  [BW] Elvia Roman               Medical Decision Making:   Initial Assessment:   This is a 28 y/o female,who presents to the ED for evaluation.  She states she was cleaning her refrigerator out when she slipped and hit the corner of her head on the freezer door. There was no LOC and she is alert and oriented x 3. She notes she recently had neck surgery via Dr. Ellis. There is no Hx of nausea or vomiting voiced today. There are no other complaints/pain in the ED at this time. She has a known hx of GERD and HTN. There is no Hx of smoking on file.     The history is provided by the patient and a relative. No  was used.     Differential Diagnosis:   With a fall and laceration to the anterior head             Clinical Impression:  Final diagnoses:  [S09.90XA] Injury of head, initial encounter (Primary)  [S01.01XA] Scalp laceration, initial encounter          ED Disposition Condition    Discharge Stable          ED Prescriptions    None       Follow-up Information    None          Rigoberto Hayes,   12/13/24 0603

## 2024-12-02 ENCOUNTER — OFFICE VISIT (OUTPATIENT)
Dept: SPINE | Facility: CLINIC | Age: 49
End: 2024-12-02
Payer: COMMERCIAL

## 2024-12-02 ENCOUNTER — HOSPITAL ENCOUNTER (OUTPATIENT)
Dept: RADIOLOGY | Facility: HOSPITAL | Age: 49
Discharge: HOME | End: 2024-12-02
Attending: ORTHOPAEDIC SURGERY
Payer: COMMERCIAL

## 2024-12-02 DIAGNOSIS — M50.30 DDD (DEGENERATIVE DISC DISEASE), CERVICAL: ICD-10-CM

## 2024-12-02 DIAGNOSIS — M54.16 LUMBAR RADICULOPATHY: ICD-10-CM

## 2024-12-02 DIAGNOSIS — M54.12 CERVICAL RADICULOPATHY: ICD-10-CM

## 2024-12-02 DIAGNOSIS — G95.9 CERVICAL MYELOPATHY: ICD-10-CM

## 2024-12-02 DIAGNOSIS — G95.9 CERVICAL MYELOPATHY: Primary | ICD-10-CM

## 2024-12-02 PROCEDURE — 72040 X-RAY EXAM NECK SPINE 2-3 VW: CPT | Mod: 26,,, | Performed by: ORTHOPAEDIC SURGERY

## 2024-12-02 PROCEDURE — 99024 POSTOP FOLLOW-UP VISIT: CPT | Mod: ,,, | Performed by: ORTHOPAEDIC SURGERY

## 2024-12-02 PROCEDURE — 72040 X-RAY EXAM NECK SPINE 2-3 VW: CPT | Mod: TC

## 2024-12-02 PROCEDURE — 99213 OFFICE O/P EST LOW 20 MIN: CPT | Mod: PBBFAC,25 | Performed by: ORTHOPAEDIC SURGERY

## 2024-12-02 PROCEDURE — 99999 PR PBB SHADOW E&M-EST. PATIENT-LVL III: CPT | Mod: PBBFAC,,, | Performed by: ORTHOPAEDIC SURGERY

## 2024-12-02 PROCEDURE — 3044F HG A1C LEVEL LT 7.0%: CPT | Mod: CPTII,,, | Performed by: ORTHOPAEDIC SURGERY

## 2024-12-02 PROCEDURE — 4010F ACE/ARB THERAPY RXD/TAKEN: CPT | Mod: CPTII,,, | Performed by: ORTHOPAEDIC SURGERY

## 2024-12-02 PROCEDURE — 1159F MED LIST DOCD IN RCRD: CPT | Mod: CPTII,,, | Performed by: ORTHOPAEDIC SURGERY

## 2024-12-02 NOTE — PROGRESS NOTES
AP, lateral views of the cervical spine reviewed    On the AP there is normal coronal alignment.  There is uncovertebral and facet hypertrophy seen.  On the lateral there is maintained cervical lordosis.  There are spondylotic changes noted with decrease in disc height and osteophyte formation seen.  Status post C6-7 ACDF.    Impression:  Degenerative changes of cervical spine as noted above

## 2024-12-02 NOTE — PROGRESS NOTES
MDM/time:  postop    ASSESSMENT:  49 y.o. female with cervical myeloradiculopathy now status post C6-7 ACDF 11/19/2024    PLAN:  Follow-up in 3 months    HPI:  49 y.o. female here for evaluation of cervical myeloradiculopathy now status post C6-7 ACDF 11/19/2024.  She is doing well.  She did have a fall a few days ago and hit her head on the freezer door.  She suffered a laceration that was closed in the ER.    IMAGING:  X-ray cervical spine reviewed show:   On the AP there is normal coronal alignment.  There is uncovertebral and facet hypertrophy seen.  On the lateral there is maintained cervical lordosis.  There are spondylotic changes noted with decrease in disc height and osteophyte formation seen.  Status post C6-7 ACDF.     Past Medical History:   Diagnosis Date    Anxiety disorder, unspecified     Asthma     GERD (gastroesophageal reflux disease)     Hypertension     Lumbar pain     Pseudoseizures     Unspecified osteoarthritis, unspecified site      Past Surgical History:   Procedure Laterality Date    ANTERIOR CERVICAL DISCECTOMY W/ FUSION N/A 11/19/2024    Procedure: C6-C7 ACDF;  Surgeon: Kulwant Ellis MD;  Location: [facility] OR;  Service: Orthopedics;  Laterality: N/A;    bladder tack      COLONOSCOPY      HYSTERECTOMY      LAPAROSCOPIC CHOLECYSTECTOMY N/A 03/10/2023    Procedure: CHOLECYSTECTOMY, LAPAROSCOPIC;  Surgeon: Josue Ramirez MD;  Location: [facility];  Service: General;  Laterality: N/A;     Social History     Tobacco Use    Smoking status: Never    Smokeless tobacco: Never   Substance Use Topics    Alcohol use: Never    Drug use: Never      Current Outpatient Medications   Medication Instructions    amLODIPine (NORVASC) 5 mg, Oral, Daily    ketoconazole (NIZORAL) 2 % shampoo Topical (Top), Twice weekly    losartan (COZAAR) 50 mg, Oral, Daily    metoprolol tartrate (LOPRESSOR) 50 mg, Oral, 2 times daily    naproxen (NAPROSYN) 500 mg, Oral, 2 times daily    oxyCODONE-acetaminophen  (PERCOCET) 5-325 mg per tablet 1 tablet, Oral, Every 4 hours PRN    pantoprazole (PROTONIX) 40 mg, Oral, Daily    pregabalin (LYRICA) 75 mg, Oral, 2 times daily    promethazine (PHENERGAN) 25 mg, Oral, Every 4 hours    triamterene-hydrochlorothiazide 37.5-25 mg (MAXZIDE-25) 37.5-25 mg per tablet 1 tablet, Oral, Daily        EXAM:  Constitutional  General Appearance:  There is no height or weight on file to calculate BMI., NAD  Psychiatric   Orientation: Oriented to time, oriented to place, oriented to person  Mood and Affect: Active and alert, normal mood, normal affect  Gait and Station   Appearance:  Normal gait, normal tandem gait, able to walk on toes, able to walk on heels  Healed incision  5/5 strength  Sensation intact  2+ pulses

## 2024-12-11 ENCOUNTER — TELEPHONE (OUTPATIENT)
Dept: SPINE | Facility: CLINIC | Age: 49
End: 2024-12-11
Payer: COMMERCIAL

## 2024-12-11 ENCOUNTER — PATIENT MESSAGE (OUTPATIENT)
Dept: SPINE | Facility: CLINIC | Age: 49
End: 2024-12-11
Payer: COMMERCIAL

## 2024-12-11 NOTE — TELEPHONE ENCOUNTER
----- Message from Mihaela sent at 12/11/2024  2:01 PM CST -----  Regarding: talk to a nurse  Who Called: Jonathan Need    Caller is requesting assistance/information from provider's office.    Symptoms (please be specific): talk to a nurse about when she can go back to work        Preferred Method of Contact: Phone Call  Patient's Preferred Phone Number on File: [phone]   Best Call Back Number, if different:  Additional Information:

## 2024-12-11 NOTE — TELEPHONE ENCOUNTER
Patient would like to go back to work on Monday with her restrictions.   She will call back or message with fax number that the letter needs to be sent to.       Kimmy Bob Staff  Caller: Unspecified (Today,  3:33 PM)  Who Called: Jonathan Need    Caller is requesting assistance/information from provider's office.    Wants to know if she can be released to go back to work because her short term disability was denied.    Preferred Method of Contact: Phone Call  Patient's Preferred Phone Number on File: [phone]  Best Call Back Number, if different:  Additional Information:

## 2024-12-11 NOTE — LETTER
December 12, 2024      [facility]  [address] [facility] [place] [address]  Phone: [phone]  Fax: [phone]       Patient: Jonathan Rodriguez   YOB: 1975  Date of Visit: 12/12/2024    To Whom It May Concern:    Francis Rodriguez is under my medical care. The patient may return to work/school on 12/16/2024 with restrictions. No lifting more than 5 lbs, no pushing, no pulling, no bending. If you have any questions or concerns, or if I can be of further assistance, please do not hesitate to contact me.    Sincerely,    Dr. Kulwant Davis, RN

## 2024-12-12 ENCOUNTER — PATIENT MESSAGE (OUTPATIENT)
Dept: FAMILY MEDICINE | Facility: CLINIC | Age: 49
End: 2024-12-12
Payer: COMMERCIAL

## 2024-12-12 ENCOUNTER — PATIENT MESSAGE (OUTPATIENT)
Dept: SPINE | Facility: CLINIC | Age: 49
End: 2024-12-12
Payer: COMMERCIAL

## 2024-12-23 DIAGNOSIS — K21.9 GASTROESOPHAGEAL REFLUX DISEASE, UNSPECIFIED WHETHER ESOPHAGITIS PRESENT: ICD-10-CM

## 2024-12-23 DIAGNOSIS — I10 HYPERTENSION, UNSPECIFIED TYPE: ICD-10-CM

## 2024-12-23 RX ORDER — PANTOPRAZOLE SODIUM 40 MG/1
40 TABLET, DELAYED RELEASE ORAL DAILY
Qty: 90 TABLET | Refills: 1 | Status: SHIPPED | OUTPATIENT
Start: 2024-12-23 | End: 2025-06-21

## 2024-12-23 RX ORDER — LOSARTAN POTASSIUM 50 MG/1
50 TABLET ORAL DAILY
Qty: 90 TABLET | Refills: 1 | Status: SHIPPED | OUTPATIENT
Start: 2024-12-23 | End: 2025-06-21

## 2024-12-23 RX ORDER — AMLODIPINE BESYLATE 5 MG/1
5 TABLET ORAL DAILY
Qty: 90 TABLET | Refills: 1 | Status: SHIPPED | OUTPATIENT
Start: 2024-12-23 | End: 2025-06-21

## 2024-12-23 RX ORDER — TRIAMTERENE/HYDROCHLOROTHIAZID 37.5-25 MG
1 TABLET ORAL DAILY
Qty: 90 TABLET | Refills: 1 | Status: SHIPPED | OUTPATIENT
Start: 2024-12-23 | End: 2025-06-21

## 2024-12-23 RX ORDER — METOPROLOL TARTRATE 50 MG/1
50 TABLET ORAL 2 TIMES DAILY
Qty: 180 TABLET | Refills: 1 | Status: SHIPPED | OUTPATIENT
Start: 2024-12-23 | End: 2025-06-21

## 2025-02-06 DIAGNOSIS — M54.10 RADICULAR LOW BACK PAIN: ICD-10-CM

## 2025-02-07 RX ORDER — PREGABALIN 75 MG/1
75 CAPSULE ORAL 2 TIMES DAILY
Qty: 60 CAPSULE | Refills: 5 | Status: SHIPPED | OUTPATIENT
Start: 2025-02-07 | End: 2025-08-06

## 2025-03-05 ENCOUNTER — OFFICE VISIT (OUTPATIENT)
Dept: CARDIOLOGY | Facility: CLINIC | Age: 50
End: 2025-03-05
Payer: COMMERCIAL

## 2025-03-05 VITALS
SYSTOLIC BLOOD PRESSURE: 138 MMHG | BODY MASS INDEX: 46.65 KG/M2 | DIASTOLIC BLOOD PRESSURE: 82 MMHG | OXYGEN SATURATION: 98 % | HEART RATE: 73 BPM | HEIGHT: 65 IN | WEIGHT: 280 LBS

## 2025-03-05 DIAGNOSIS — R06.02 SHORTNESS OF BREATH: ICD-10-CM

## 2025-03-05 DIAGNOSIS — R00.2 PALPITATIONS: ICD-10-CM

## 2025-03-05 DIAGNOSIS — R07.9 CHEST PAIN, UNSPECIFIED TYPE: Primary | ICD-10-CM

## 2025-03-05 DIAGNOSIS — I34.0 MITRAL VALVE INSUFFICIENCY, UNSPECIFIED ETIOLOGY: ICD-10-CM

## 2025-03-05 LAB
OHS QRS DURATION: 90 MS
OHS QTC CALCULATION: 458 MS

## 2025-03-05 PROCEDURE — 4010F ACE/ARB THERAPY RXD/TAKEN: CPT | Mod: CPTII,,, | Performed by: STUDENT IN AN ORGANIZED HEALTH CARE EDUCATION/TRAINING PROGRAM

## 2025-03-05 PROCEDURE — 1159F MED LIST DOCD IN RCRD: CPT | Mod: CPTII,,, | Performed by: STUDENT IN AN ORGANIZED HEALTH CARE EDUCATION/TRAINING PROGRAM

## 2025-03-05 PROCEDURE — 99215 OFFICE O/P EST HI 40 MIN: CPT | Mod: PBBFAC,25 | Performed by: STUDENT IN AN ORGANIZED HEALTH CARE EDUCATION/TRAINING PROGRAM

## 2025-03-05 PROCEDURE — 93010 ELECTROCARDIOGRAM REPORT: CPT | Mod: S$PBB,,, | Performed by: STUDENT IN AN ORGANIZED HEALTH CARE EDUCATION/TRAINING PROGRAM

## 2025-03-05 PROCEDURE — 99999 PR PBB SHADOW E&M-EST. PATIENT-LVL V: CPT | Mod: PBBFAC,,, | Performed by: STUDENT IN AN ORGANIZED HEALTH CARE EDUCATION/TRAINING PROGRAM

## 2025-03-05 PROCEDURE — 3079F DIAST BP 80-89 MM HG: CPT | Mod: CPTII,,, | Performed by: STUDENT IN AN ORGANIZED HEALTH CARE EDUCATION/TRAINING PROGRAM

## 2025-03-05 PROCEDURE — 3075F SYST BP GE 130 - 139MM HG: CPT | Mod: CPTII,,, | Performed by: STUDENT IN AN ORGANIZED HEALTH CARE EDUCATION/TRAINING PROGRAM

## 2025-03-05 PROCEDURE — 3008F BODY MASS INDEX DOCD: CPT | Mod: CPTII,,, | Performed by: STUDENT IN AN ORGANIZED HEALTH CARE EDUCATION/TRAINING PROGRAM

## 2025-03-05 PROCEDURE — 93005 ELECTROCARDIOGRAM TRACING: CPT | Mod: PBBFAC | Performed by: STUDENT IN AN ORGANIZED HEALTH CARE EDUCATION/TRAINING PROGRAM

## 2025-03-05 PROCEDURE — 99214 OFFICE O/P EST MOD 30 MIN: CPT | Mod: S$PBB,,, | Performed by: STUDENT IN AN ORGANIZED HEALTH CARE EDUCATION/TRAINING PROGRAM

## 2025-03-05 NOTE — PROGRESS NOTES
PCP: Kj Kramer MD    Referring Provider: No referring provider defined for this encounter.    Reason for referral: Perioperative cardiac risk assessment     Subjective:   Jonathan Rodriguez is a 49 y.o. female with hx of hypertension who presents for a new patient visit.    1-2 time/week for yeara    Patient is referred by Dr. Mata for perioperative cardiac risk assessment prior to C6-7 anterior cervical diskectomy and fusion (ACDF) with Dr. Ellis scheduled for this week on 11/14/24.  Patient complains of intermittent chest pain over the last 1 year. Notes chest pain is relatively infrequent and is associated with lifting heavy objects.  She also complains of rare episodes of chest tightness with heavy exertion.  She has not undergone any stress testing in the past.  Notes chronic mild dyspnea on exertion- which has been unchanged for years.    Fhx:  No family history of premature CAD in first-degree relatives  Shx: Never smoker     EKG 11/04/2024: Sinus bradycardia 53 beats per minute, anterolateral infarct, age undetermined    ECHO Results for orders placed during the hospital encounter of 12/08/23    Echo    Interpretation Summary    Left Ventricle: The left ventricle is normal in size. Increased wall thickness. Septal thickening. There is concentric hypertrophy. Normal wall motion. There is normal systolic function with a visually estimated ejection fraction of 60 - 65%. There is normal diastolic function.    Right Ventricle: Normal right ventricular cavity size. Systolic function is normal.    Aortic Valve: The aortic valve is a trileaflet valve.    Mitral Valve: There is mild regurgitation.    Tricuspid Valve: There is mild regurgitation.    Pulmonary Artery: The estimated pulmonary artery systolic pressure is 19 mmHg.    IVC/SVC: Normal venous pressure at 3 mmHg.     CATH: No results found for this or any previous visit.     Lab Results   Component Value Date     11/04/2024    K 3.4 (L) 11/04/2024      11/04/2024    CO2 30 11/04/2024    BUN 12 11/04/2024    CREATININE 1.12 (H) 11/04/2024    CALCIUM 9.1 11/04/2024    ANIONGAP 9 11/04/2024    EGFRNONAA 73 11/22/2021       Lab Results   Component Value Date    CHOL 142 11/15/2023    CHOL 164 05/03/2021     Lab Results   Component Value Date    HDL 47 11/15/2023    HDL 47 05/03/2021     Lab Results   Component Value Date    LDLCALC 75 11/15/2023    LDLCALC 102 05/03/2021     Lab Results   Component Value Date    TRIG 98 11/15/2023    TRIG 76 05/03/2021     Lab Results   Component Value Date    CHOLHDL 3.0 11/15/2023    CHOLHDL 3.5 05/03/2021       Lab Results   Component Value Date    WBC 11.09 (H) 11/04/2024    HGB 13.9 11/04/2024    HCT 44.2 11/04/2024    MCV 91.5 11/04/2024     11/04/2024           Current Outpatient Medications:     amLODIPine (NORVASC) 5 MG tablet, Take 1 tablet (5 mg total) by mouth once daily., Disp: 90 tablet, Rfl: 1    losartan (COZAAR) 50 MG tablet, Take 1 tablet (50 mg total) by mouth once daily., Disp: 90 tablet, Rfl: 1    metoprolol tartrate (LOPRESSOR) 50 MG tablet, Take 1 tablet (50 mg total) by mouth 2 (two) times a day., Disp: 180 tablet, Rfl: 1    pantoprazole (PROTONIX) 40 MG tablet, Take 1 tablet (40 mg total) by mouth once daily., Disp: 90 tablet, Rfl: 1    pregabalin (LYRICA) 75 MG capsule, Take 1 capsule (75 mg total) by mouth 2 (two) times daily. (Patient taking differently: Take 150 mg by mouth 2 (two) times daily.), Disp: 60 capsule, Rfl: 5    triamterene-hydrochlorothiazide 37.5-25 mg (MAXZIDE-25) 37.5-25 mg per tablet, Take 1 tablet by mouth Daily., Disp: 90 tablet, Rfl: 1    ketoconazole (NIZORAL) 2 % shampoo, Apply topically twice a week., Disp: 120 mL, Rfl: 2    naproxen (NAPROSYN) 500 MG tablet, Take 1 tablet (500 mg total) by mouth 2 (two) times daily., Disp: 30 tablet, Rfl: 0    oxyCODONE-acetaminophen (PERCOCET) 5-325 mg per tablet, Take 1 tablet by mouth every 4 (four) hours as needed for Pain., Disp: 45  "tablet, Rfl: 0    promethazine (PHENERGAN) 25 MG tablet, Take 1 tablet (25 mg total) by mouth every 4 (four) hours., Disp: 45 tablet, Rfl: 0    Review of Systems   Constitutional:  Negative for chills, diaphoresis, fever and malaise/fatigue.   Respiratory:  Positive for shortness of breath. Negative for cough.    Cardiovascular:  Positive for chest pain. Negative for palpitations, orthopnea, claudication, leg swelling and PND.   Gastrointestinal:  Negative for abdominal pain, heartburn, nausea and vomiting.   Neurological:  Negative for dizziness.       Objective:   BP (!) 142/100 (BP Location: Right arm, Patient Position: Sitting)   Pulse 73   Ht 5' 5" (1.651 m)   Wt 127 kg (280 lb)   SpO2 98%   BMI 46.59 kg/m²     Physical Exam  Constitutional:       General: She is not in acute distress.     Appearance: Normal appearance.   Cardiovascular:      Rate and Rhythm: Normal rate and regular rhythm.      Pulses: Normal pulses.      Heart sounds: Normal heart sounds. No murmur heard.     No friction rub. No gallop.   Pulmonary:      Effort: Pulmonary effort is normal.      Breath sounds: Normal breath sounds. No wheezing or rales.   Musculoskeletal:      Right lower leg: No edema.      Left lower leg: No edema.   Skin:     General: Skin is warm and dry.   Neurological:      Mental Status: She is alert.           Assessment:     No diagnosis found.        Plan:   No problem-specific Assessment & Plan notes found for this encounter.      Perioperative cardiac risk assessment  Chest pain  Complains of atypical chest pain when she is lifting heavy objects and infrequent episodes of chest tightness with heavy exertion  Discussed stress testing for further risk stratification, however, surgery is already scheduled 3 days from now and patient became very upset and tearful at the prospect of having to reschedule her surgery (for cardiac testing) because she notes that she has already taken time off for surgery.  She feels " her neck pain is very severe and needs to be addressed right away. She also notes that she has undergone cholecystectomy within the last 1 year following which she had no cardiac complications.   At least moderate cardiac risk. Patient understands risks.     Sinus bradycardia  Sinus bradycardia 53 bpm, in the setting of beta blocker use   Heart rate 62 beats per minute    Mild mitral regurgitation  Repeat echo 3 years from prior - tentatively December 2026    Follow-up in 3 months

## 2025-03-27 ENCOUNTER — TELEPHONE (OUTPATIENT)
Dept: CARDIOLOGY | Facility: HOSPITAL | Age: 50
End: 2025-03-27
Payer: COMMERCIAL

## 2025-03-27 NOTE — TELEPHONE ENCOUNTER
"Spoke with pt today to stress restrictions for am stress test. Pt states " I have noticed that the L side of my body has been swelling." When questioned further, states it is just the Leg.  "

## 2025-03-28 ENCOUNTER — HOSPITAL ENCOUNTER (OUTPATIENT)
Dept: CARDIOLOGY | Facility: HOSPITAL | Age: 50
Discharge: HOME | End: 2025-03-28
Attending: STUDENT IN AN ORGANIZED HEALTH CARE EDUCATION/TRAINING PROGRAM
Payer: COMMERCIAL

## 2025-03-28 DIAGNOSIS — R00.2 PALPITATIONS: ICD-10-CM

## 2025-03-28 DIAGNOSIS — R07.9 CHEST PAIN, UNSPECIFIED TYPE: ICD-10-CM

## 2025-03-28 LAB
CV STRESS BASE HR: 75 BPM
DIASTOLIC BLOOD PRESSURE: 91 MMHG
OHS CV CPX 1 MINUTE RECOVERY HEART RATE: 94 BPM
OHS CV CPX 85 PERCENT MAX PREDICTED HEART RATE MALE: 146
OHS CV CPX ESTIMATED METS: 6
OHS CV CPX MAX PREDICTED HEART RATE: 172
OHS CV CPX PATIENT IS FEMALE: 1
OHS CV CPX PATIENT IS MALE: 0
OHS CV CPX PEAK DIASTOLIC BLOOD PRESSURE: 92 MMHG
OHS CV CPX PEAK HEAR RATE: 162 BPM
OHS CV CPX PEAK RATE PRESSURE PRODUCT: NORMAL
OHS CV CPX PEAK SYSTOLIC BLOOD PRESSURE: 195 MMHG
OHS CV CPX PERCENT MAX PREDICTED HEART RATE ACHIEVED: 99
OHS CV CPX RATE PRESSURE PRODUCT PRESENTING: NORMAL
STRESS ECHO POST EXERCISE DUR MIN: 3 MINUTES
STRESS ECHO POST EXERCISE DUR SEC: 29 SECONDS
SYSTOLIC BLOOD PRESSURE: 139 MMHG

## 2025-03-28 PROCEDURE — 93016 CV STRESS TEST SUPVJ ONLY: CPT | Mod: ,,, | Performed by: STUDENT IN AN ORGANIZED HEALTH CARE EDUCATION/TRAINING PROGRAM

## 2025-03-28 PROCEDURE — 93018 CV STRESS TEST I&R ONLY: CPT | Mod: ,,, | Performed by: STUDENT IN AN ORGANIZED HEALTH CARE EDUCATION/TRAINING PROGRAM

## 2025-03-28 PROCEDURE — 93225 XTRNL ECG REC<48 HRS REC: CPT

## 2025-03-28 PROCEDURE — 93017 CV STRESS TEST TRACING ONLY: CPT

## 2025-04-01 ENCOUNTER — TELEPHONE (OUTPATIENT)
Dept: CARDIOLOGY | Facility: CLINIC | Age: 50
End: 2025-04-01
Payer: COMMERCIAL

## 2025-04-01 NOTE — TELEPHONE ENCOUNTER
----- Message from Nery sent at 4/1/2025  2:47 PM CDT -----  Who Called: Jonathan RodriguezOlivia is requesting information on test results.Name of Test (Lab/Mammo/Etc):  Heart monitor Date of Test: 03/28Where the test was performed: homeOrdering Provider: Dr. Riley Method of Contact: Phone CallPatient's Preferred Phone Number on File: [phone] Best Call Back Number, if different:Additional Information:

## 2025-04-03 ENCOUNTER — RESULTS FOLLOW-UP (OUTPATIENT)
Dept: CARDIOLOGY | Facility: CLINIC | Age: 50
End: 2025-04-03

## 2025-04-03 LAB
CV STRESS BASE HR: 75 BPM
DIASTOLIC BLOOD PRESSURE: 91 MMHG
OHS CV CPX 1 MINUTE RECOVERY HEART RATE: 94 BPM
OHS CV CPX 85 PERCENT MAX PREDICTED HEART RATE MALE: 146
OHS CV CPX ESTIMATED METS: 6
OHS CV CPX MAX PREDICTED HEART RATE: 172
OHS CV CPX PATIENT IS FEMALE: 1
OHS CV CPX PATIENT IS MALE: 0
OHS CV CPX PEAK DIASTOLIC BLOOD PRESSURE: 92 MMHG
OHS CV CPX PEAK HEAR RATE: 162 BPM
OHS CV CPX PEAK RATE PRESSURE PRODUCT: NORMAL
OHS CV CPX PEAK SYSTOLIC BLOOD PRESSURE: 195 MMHG
OHS CV CPX PERCENT MAX PREDICTED HEART RATE ACHIEVED: 99
OHS CV CPX RATE PRESSURE PRODUCT PRESENTING: NORMAL
OHS CV EVENT MONITOR DAY: 0
OHS CV HOLTER LENGTH DECIMAL HOURS: 48
OHS CV HOLTER LENGTH HOURS: 48
OHS CV HOLTER LENGTH MINUTES: 0
OHS CV HOLTER SINUS AVERAGE HR: 82
OHS CV HOLTER SINUS MAX HR: 143
OHS CV HOLTER SINUS MIN HR: 49
STRESS ECHO POST EXERCISE DUR MIN: 3 MINUTES
STRESS ECHO POST EXERCISE DUR SEC: 29 SECONDS
SYSTOLIC BLOOD PRESSURE: 139 MMHG

## 2025-04-03 PROCEDURE — 93227 XTRNL ECG REC<48 HR R&I: CPT | Mod: ,,, | Performed by: STUDENT IN AN ORGANIZED HEALTH CARE EDUCATION/TRAINING PROGRAM

## 2025-05-12 ENCOUNTER — HOSPITAL ENCOUNTER (OUTPATIENT)
Dept: RADIOLOGY | Facility: HOSPITAL | Age: 50
Discharge: HOME | End: 2025-05-12
Attending: FAMILY MEDICINE
Payer: COMMERCIAL

## 2025-05-12 VITALS — HEIGHT: 66 IN | WEIGHT: 278 LBS | BODY MASS INDEX: 44.68 KG/M2

## 2025-05-12 DIAGNOSIS — Z12.31 OTHER SCREENING MAMMOGRAM: ICD-10-CM

## 2025-05-12 PROCEDURE — 77067 SCR MAMMO BI INCL CAD: CPT | Mod: 26,,, | Performed by: RADIOLOGY

## 2025-05-12 PROCEDURE — 77067 SCR MAMMO BI INCL CAD: CPT | Mod: TC

## 2025-05-12 PROCEDURE — 77063 BREAST TOMOSYNTHESIS BI: CPT | Mod: 26,,, | Performed by: RADIOLOGY

## 2025-05-22 ENCOUNTER — RESULTS FOLLOW-UP (OUTPATIENT)
Dept: FAMILY MEDICINE | Facility: CLINIC | Age: 50
End: 2025-05-22

## 2025-09-02 ENCOUNTER — OFFICE VISIT (OUTPATIENT)
Dept: FAMILY MEDICINE | Facility: CLINIC | Age: 50
End: 2025-09-02
Payer: COMMERCIAL

## 2025-09-02 VITALS
HEART RATE: 76 BPM | HEIGHT: 66 IN | TEMPERATURE: 98 F | OXYGEN SATURATION: 96 % | RESPIRATION RATE: 22 BRPM | DIASTOLIC BLOOD PRESSURE: 78 MMHG | WEIGHT: 293 LBS | SYSTOLIC BLOOD PRESSURE: 125 MMHG | BODY MASS INDEX: 47.09 KG/M2

## 2025-09-02 DIAGNOSIS — Z71.3 DIETARY COUNSELING: ICD-10-CM

## 2025-09-02 DIAGNOSIS — Z76.0 ENCOUNTER FOR MEDICATION REFILL: ICD-10-CM

## 2025-09-02 DIAGNOSIS — M54.10 RADICULAR LOW BACK PAIN: ICD-10-CM

## 2025-09-02 DIAGNOSIS — I10 HYPERTENSION, UNSPECIFIED TYPE: Primary | ICD-10-CM

## 2025-09-02 DIAGNOSIS — Z72.9 LIFESTYLE PROBLEMS: ICD-10-CM

## 2025-09-02 DIAGNOSIS — K21.9 GASTROESOPHAGEAL REFLUX DISEASE, UNSPECIFIED WHETHER ESOPHAGITIS PRESENT: ICD-10-CM

## 2025-09-02 DIAGNOSIS — G47.33 OSA (OBSTRUCTIVE SLEEP APNEA): ICD-10-CM

## 2025-09-02 DIAGNOSIS — M25.511 ACUTE PAIN OF RIGHT SHOULDER: ICD-10-CM

## 2025-09-02 PROCEDURE — 3008F BODY MASS INDEX DOCD: CPT | Mod: ,,, | Performed by: FAMILY MEDICINE

## 2025-09-02 PROCEDURE — 4010F ACE/ARB THERAPY RXD/TAKEN: CPT | Mod: ,,, | Performed by: FAMILY MEDICINE

## 2025-09-02 PROCEDURE — 99214 OFFICE O/P EST MOD 30 MIN: CPT | Mod: GE,,, | Performed by: FAMILY MEDICINE

## 2025-09-02 PROCEDURE — 1159F MED LIST DOCD IN RCRD: CPT | Mod: ,,, | Performed by: FAMILY MEDICINE

## 2025-09-02 PROCEDURE — 3074F SYST BP LT 130 MM HG: CPT | Mod: ,,, | Performed by: FAMILY MEDICINE

## 2025-09-02 PROCEDURE — 3078F DIAST BP <80 MM HG: CPT | Mod: ,,, | Performed by: FAMILY MEDICINE

## 2025-09-02 RX ORDER — PREGABALIN 75 MG/1
75 CAPSULE ORAL 2 TIMES DAILY
Qty: 60 CAPSULE | Refills: 5 | Status: CANCELLED | OUTPATIENT
Start: 2025-09-02 | End: 2026-03-01

## 2025-09-02 RX ORDER — AMLODIPINE BESYLATE 5 MG/1
5 TABLET ORAL DAILY
Qty: 90 TABLET | Refills: 0 | Status: SHIPPED | OUTPATIENT
Start: 2025-09-02 | End: 2026-03-01

## 2025-09-02 RX ORDER — PANTOPRAZOLE SODIUM 40 MG/1
40 TABLET, DELAYED RELEASE ORAL DAILY
Qty: 90 TABLET | Refills: 0 | Status: SHIPPED | OUTPATIENT
Start: 2025-09-02 | End: 2026-03-01

## 2025-09-02 RX ORDER — METOPROLOL TARTRATE 50 MG/1
50 TABLET ORAL 2 TIMES DAILY
Qty: 180 TABLET | Refills: 0 | Status: SHIPPED | OUTPATIENT
Start: 2025-09-02 | End: 2026-03-01

## 2025-09-02 RX ORDER — TRIAMTERENE AND HYDROCHLOROTHIAZIDE 37.5; 25 MG/1; MG/1
1 TABLET ORAL DAILY
Qty: 90 TABLET | Refills: 0 | Status: SHIPPED | OUTPATIENT
Start: 2025-09-02 | End: 2026-03-01

## 2025-09-02 RX ORDER — LOSARTAN POTASSIUM 50 MG/1
50 TABLET ORAL DAILY
Qty: 90 TABLET | Refills: 0 | Status: SHIPPED | OUTPATIENT
Start: 2025-09-02 | End: 2026-03-01

## (undated) DEVICE — DRAPE THREE-QTR REINF 53X77IN

## (undated) DEVICE — FOAM POSITIONER ARM SURGICAL

## (undated) DEVICE — ELECTRODE LAPSCP L HOOK 36CM

## (undated) DEVICE — GLOVE SENSICARE PI SURG 8.5

## (undated) DEVICE — GLOVE 6.0 PROTEXIS PI BLUE

## (undated) DEVICE — Device

## (undated) DEVICE — APPLICATOR CHLORAPREP ORN 26ML

## (undated) DEVICE — SYS CLSR DERMABOND PRINEO 22CM

## (undated) DEVICE — SUT PDS II O CT-2 VIL MONO

## (undated) DEVICE — DRAPE C-ARMOR EQUIPMENT COVER

## (undated) DEVICE — CATH IV INTROCAN 14G X 2.

## (undated) DEVICE — GLOVE PROTEXIS PI SYN SURG 6.0

## (undated) DEVICE — GLOVE SENSICARE PI GRN 6

## (undated) DEVICE — GLOVE PROTEXIS PI SYN SURG 8.0

## (undated) DEVICE — GAUZE SPONGE PEANUT STRL

## (undated) DEVICE — GLOVE PROTEXIS PI SYN SURG 7

## (undated) DEVICE — SCISSORS INSERT ELECTROSCOPE

## (undated) DEVICE — PENCIL ELECTROSURG HOLST W/BLD

## (undated) DEVICE — TROCAR ENDO Z THREAD KII 5X100

## (undated) DEVICE — SOL NACL IRR 1000ML BTL

## (undated) DEVICE — GLOVE SENSICARE PI SURG 7

## (undated) DEVICE — PIN DISTRACTION DISP 14MM
Type: IMPLANTABLE DEVICE | Site: SPINE CERVICAL | Status: NON-FUNCTIONAL
Removed: 2024-11-19

## (undated) DEVICE — MATRIX FLOSEAL HEMOSTATIC 10ML

## (undated) DEVICE — CANNULA LAP SEAL Z THRD 5X100

## (undated) DEVICE — GOWN POLY REINF BRTH SLV XL

## (undated) DEVICE — TROCAR KII BLLN 12MM 10CM

## (undated) DEVICE — SUT STRATAFIX PDS 2-0 SH 5IN

## (undated) DEVICE — SUT MONOCYRL 4-0 PS2 UND

## (undated) DEVICE — SUT 4-0 VICRYL / FS-2

## (undated) DEVICE — WARMER BLUE HEAT SCOPE 3-12MM

## (undated) DEVICE — GLOVE PROTEXIS PI SYN SURG 6.5

## (undated) DEVICE — SUTURE STRATAFIX CP-2 24X24

## (undated) DEVICE — APPLIER CLIP ENDO LIGAMAX 5MM

## (undated) DEVICE — SPONGE COTTON TRAY 4X4IN

## (undated) DEVICE — GLOVE SENSICARE PI GRN 6.5

## (undated) DEVICE — SUT VICRYL PLUS CT-1 1 8-27IN

## (undated) DEVICE — DRILL NEURO SOFT TOUCH 2X3.1MM

## (undated) DEVICE — GLOVE SENSICARE PI GRN 8.5

## (undated) DEVICE — 14MM DRILL BIT

## (undated) DEVICE — DRESSING TRANS 4X4 TEGADERM

## (undated) DEVICE — POSITIONER HEAD DONUT 9IN FOAM